# Patient Record
Sex: MALE | Race: WHITE | NOT HISPANIC OR LATINO | Employment: OTHER | URBAN - METROPOLITAN AREA
[De-identification: names, ages, dates, MRNs, and addresses within clinical notes are randomized per-mention and may not be internally consistent; named-entity substitution may affect disease eponyms.]

---

## 2017-09-29 ENCOUNTER — APPOINTMENT (OUTPATIENT)
Dept: PREADMISSION TESTING | Facility: HOSPITAL | Age: 72
End: 2017-09-29

## 2017-09-29 ENCOUNTER — HOSPITAL ENCOUNTER (OUTPATIENT)
Dept: GENERAL RADIOLOGY | Facility: HOSPITAL | Age: 72
Discharge: HOME | End: 2017-09-29
Admitting: UROLOGY

## 2017-09-29 VITALS — HEIGHT: 68 IN | WEIGHT: 241.4 LBS | BODY MASS INDEX: 36.59 KG/M2

## 2017-09-29 LAB
ANION GAP SERPL CALCULATED.3IONS-SCNC: 3 MMOL/L (ref 3–11)
APTT PPP: 27 SECONDS (ref 24–31)
BUN BLD-MCNC: 12 MG/DL (ref 9–23)
BUN/CREAT SERPL: 13.3 (ref 7–25)
CALCIUM SPEC-SCNC: 9.3 MG/DL (ref 8.7–10.4)
CHLORIDE SERPL-SCNC: 107 MMOL/L (ref 99–109)
CO2 SERPL-SCNC: 30 MMOL/L (ref 20–31)
CREAT BLD-MCNC: 0.9 MG/DL (ref 0.6–1.3)
DEPRECATED RDW RBC AUTO: 44.6 FL (ref 37–54)
ERYTHROCYTE [DISTWIDTH] IN BLOOD BY AUTOMATED COUNT: 13.5 % (ref 11.3–14.5)
GFR SERPL CREATININE-BSD FRML MDRD: 83 ML/MIN/1.73
GLUCOSE BLD-MCNC: 117 MG/DL (ref 70–100)
HBA1C MFR BLD: 5.6 % (ref 4.8–5.6)
HCT VFR BLD AUTO: 42.6 % (ref 38.9–50.9)
HGB BLD-MCNC: 14.7 G/DL (ref 13.1–17.5)
INR PPP: 0.98
MCH RBC QN AUTO: 31.5 PG (ref 27–31)
MCHC RBC AUTO-ENTMCNC: 34.5 G/DL (ref 32–36)
MCV RBC AUTO: 91.4 FL (ref 80–99)
PLATELET # BLD AUTO: 162 10*3/MM3 (ref 150–450)
PMV BLD AUTO: 10 FL (ref 6–12)
POTASSIUM BLD-SCNC: 3.9 MMOL/L (ref 3.5–5.5)
PROTHROMBIN TIME: 10.7 SECONDS (ref 9.6–11.5)
RBC # BLD AUTO: 4.66 10*6/MM3 (ref 4.2–5.76)
SODIUM BLD-SCNC: 140 MMOL/L (ref 132–146)
WBC NRBC COR # BLD: 5.4 10*3/MM3 (ref 3.5–10.8)

## 2017-09-29 PROCEDURE — 85027 COMPLETE CBC AUTOMATED: CPT | Performed by: UROLOGY

## 2017-09-29 PROCEDURE — 85610 PROTHROMBIN TIME: CPT | Performed by: UROLOGY

## 2017-09-29 PROCEDURE — 80048 BASIC METABOLIC PNL TOTAL CA: CPT | Performed by: UROLOGY

## 2017-09-29 PROCEDURE — 85730 THROMBOPLASTIN TIME PARTIAL: CPT | Performed by: UROLOGY

## 2017-09-29 PROCEDURE — 83036 HEMOGLOBIN GLYCOSYLATED A1C: CPT | Performed by: UROLOGY

## 2017-09-29 PROCEDURE — 36415 COLL VENOUS BLD VENIPUNCTURE: CPT

## 2017-09-29 PROCEDURE — 71020 HC CHEST PA AND LATERAL: CPT

## 2017-09-29 RX ORDER — DOXAZOSIN MESYLATE 4 MG/1
4 TABLET ORAL
COMMUNITY
End: 2022-06-13 | Stop reason: ALTCHOICE

## 2017-09-29 RX ORDER — LANSOPRAZOLE 30 MG/1
30 CAPSULE, DELAYED RELEASE ORAL 2 TIMES DAILY
COMMUNITY
End: 2022-06-01

## 2017-09-29 RX ORDER — ASCORBIC ACID 500 MG
1000 TABLET ORAL DAILY
COMMUNITY

## 2017-09-29 RX ORDER — ATORVASTATIN CALCIUM 20 MG/1
20 TABLET, FILM COATED ORAL NIGHTLY
COMMUNITY
End: 2022-04-07

## 2017-09-29 RX ORDER — AMLODIPINE BESYLATE AND BENAZEPRIL HYDROCHLORIDE 5; 20 MG/1; MG/1
1 CAPSULE ORAL
Status: ON HOLD | COMMUNITY
End: 2021-11-10

## 2017-09-29 RX ORDER — ASPIRIN 81 MG/1
81 TABLET ORAL DAILY
COMMUNITY

## 2017-09-29 RX ORDER — DOXEPIN HYDROCHLORIDE 50 MG/1
50 CAPSULE ORAL NIGHTLY
COMMUNITY

## 2017-10-02 ENCOUNTER — ANESTHESIA EVENT (OUTPATIENT)
Dept: PERIOP | Facility: HOSPITAL | Age: 72
End: 2017-10-02

## 2017-10-02 RX ORDER — FAMOTIDINE 10 MG/ML
20 INJECTION, SOLUTION INTRAVENOUS ONCE
Status: CANCELLED | OUTPATIENT
Start: 2017-10-02 | End: 2017-10-02

## 2017-10-03 ENCOUNTER — HOSPITAL ENCOUNTER (INPATIENT)
Facility: HOSPITAL | Age: 72
LOS: 2 days | Discharge: HOME | End: 2017-10-05
Attending: UROLOGY | Admitting: UROLOGY

## 2017-10-03 ENCOUNTER — ANESTHESIA (OUTPATIENT)
Dept: PERIOP | Facility: HOSPITAL | Age: 72
End: 2017-10-03

## 2017-10-03 DIAGNOSIS — C61 PROSTATE CANCER (HCC): ICD-10-CM

## 2017-10-03 PROBLEM — G47.33 OSA ON CPAP: Status: ACTIVE | Noted: 2017-10-03

## 2017-10-03 PROBLEM — E78.5 HLD (HYPERLIPIDEMIA): Status: ACTIVE | Noted: 2017-10-03

## 2017-10-03 PROBLEM — Z90.79 S/P PROSTATECTOMY: Status: ACTIVE | Noted: 2017-10-03

## 2017-10-03 PROBLEM — Z99.89 OSA ON CPAP: Status: ACTIVE | Noted: 2017-10-03

## 2017-10-03 PROBLEM — I10 HTN (HYPERTENSION): Status: ACTIVE | Noted: 2017-10-03

## 2017-10-03 LAB
GLUCOSE BLDC GLUCOMTR-MCNC: 185 MG/DL (ref 70–130)
POTASSIUM BLDA-SCNC: 4.07 MMOL/L (ref 3.5–5.3)

## 2017-10-03 PROCEDURE — 25010000002 FENTANYL CITRATE (PF) 100 MCG/2ML SOLUTION: Performed by: NURSE ANESTHETIST, CERTIFIED REGISTERED

## 2017-10-03 PROCEDURE — 84132 ASSAY OF SERUM POTASSIUM: CPT | Performed by: ANESTHESIOLOGY

## 2017-10-03 PROCEDURE — 25010000002 HYDROMORPHONE PER 4 MG: Performed by: NURSE ANESTHETIST, CERTIFIED REGISTERED

## 2017-10-03 PROCEDURE — 25010000002 CEFOXITIN: Performed by: UROLOGY

## 2017-10-03 PROCEDURE — 25010000002 BUPRENORPHINE PER 0.1 MG: Performed by: NURSE ANESTHETIST, CERTIFIED REGISTERED

## 2017-10-03 PROCEDURE — 25010000002 ENOXAPARIN PER 10 MG: Performed by: UROLOGY

## 2017-10-03 PROCEDURE — 25010000002 ONDANSETRON PER 1 MG: Performed by: NURSE ANESTHETIST, CERTIFIED REGISTERED

## 2017-10-03 PROCEDURE — 25010000002 HYDRALAZINE PER 20 MG: Performed by: NURSE PRACTITIONER

## 2017-10-03 PROCEDURE — 25010000002 HYDROMORPHONE PER 4 MG: Performed by: UROLOGY

## 2017-10-03 PROCEDURE — 25010000002 ONDANSETRON PER 1 MG: Performed by: NURSE PRACTITIONER

## 2017-10-03 PROCEDURE — 25010000002 NEOSTIGMINE PER 0.5 MG: Performed by: NURSE ANESTHETIST, CERTIFIED REGISTERED

## 2017-10-03 PROCEDURE — 0VT04ZZ RESECTION OF PROSTATE, PERCUTANEOUS ENDOSCOPIC APPROACH: ICD-10-PCS | Performed by: UROLOGY

## 2017-10-03 PROCEDURE — 88309 TISSUE EXAM BY PATHOLOGIST: CPT | Performed by: UROLOGY

## 2017-10-03 PROCEDURE — 8E0W4CZ ROBOTIC ASSISTED PROCEDURE OF TRUNK REGION, PERCUTANEOUS ENDOSCOPIC APPROACH: ICD-10-PCS | Performed by: UROLOGY

## 2017-10-03 PROCEDURE — 82962 GLUCOSE BLOOD TEST: CPT

## 2017-10-03 PROCEDURE — 25010000002 DEXAMETHASONE PER 1 MG: Performed by: NURSE ANESTHETIST, CERTIFIED REGISTERED

## 2017-10-03 PROCEDURE — 25010000002 PROPOFOL 10 MG/ML EMULSION: Performed by: NURSE ANESTHETIST, CERTIFIED REGISTERED

## 2017-10-03 PROCEDURE — 25010000002 ONDANSETRON PER 1 MG: Performed by: UROLOGY

## 2017-10-03 PROCEDURE — 25010000002 DEXAMETHASONE SODIUM PHOSPHATE 10 MG/ML SOLUTION: Performed by: NURSE ANESTHETIST, CERTIFIED REGISTERED

## 2017-10-03 DEVICE — GRFT ECM STRAVIX FZ 3X6CM 18SQCM: Type: IMPLANTABLE DEVICE | Status: FUNCTIONAL

## 2017-10-03 DEVICE — SEALANT FIBRIN TISSEEL FZ 4ML: Type: IMPLANTABLE DEVICE | Site: BLADDER | Status: FUNCTIONAL

## 2017-10-03 RX ORDER — PANTOPRAZOLE SODIUM 40 MG/1
40 TABLET, DELAYED RELEASE ORAL EVERY MORNING
Status: DISCONTINUED | OUTPATIENT
Start: 2017-10-04 | End: 2017-10-05 | Stop reason: HOSPADM

## 2017-10-03 RX ORDER — LIDOCAINE HYDROCHLORIDE 10 MG/ML
INJECTION, SOLUTION INFILTRATION; PERINEURAL AS NEEDED
Status: DISCONTINUED | OUTPATIENT
Start: 2017-10-03 | End: 2017-10-03 | Stop reason: SURG

## 2017-10-03 RX ORDER — MAGNESIUM HYDROXIDE 1200 MG/15ML
LIQUID ORAL AS NEEDED
Status: DISCONTINUED | OUTPATIENT
Start: 2017-10-03 | End: 2017-10-03 | Stop reason: HOSPADM

## 2017-10-03 RX ORDER — BUPIVACAINE HYDROCHLORIDE 2.5 MG/ML
INJECTION, SOLUTION EPIDURAL; INFILTRATION; INTRACAUDAL AS NEEDED
Status: DISCONTINUED | OUTPATIENT
Start: 2017-10-03 | End: 2017-10-03 | Stop reason: SURG

## 2017-10-03 RX ORDER — PROMETHAZINE HYDROCHLORIDE 25 MG/ML
6.25 INJECTION, SOLUTION INTRAMUSCULAR; INTRAVENOUS ONCE AS NEEDED
Status: DISCONTINUED | OUTPATIENT
Start: 2017-10-03 | End: 2017-10-03 | Stop reason: HOSPADM

## 2017-10-03 RX ORDER — DOCUSATE SODIUM 100 MG/1
100 CAPSULE, LIQUID FILLED ORAL 2 TIMES DAILY PRN
Status: DISCONTINUED | OUTPATIENT
Start: 2017-10-03 | End: 2017-10-05 | Stop reason: HOSPADM

## 2017-10-03 RX ORDER — FAMOTIDINE 20 MG/1
20 TABLET, FILM COATED ORAL ONCE
Status: COMPLETED | OUTPATIENT
Start: 2017-10-03 | End: 2017-10-03

## 2017-10-03 RX ORDER — BENAZEPRIL HYDROCHLORIDE 20 MG/1
20 TABLET ORAL
Status: DISCONTINUED | OUTPATIENT
Start: 2017-10-03 | End: 2017-10-05 | Stop reason: HOSPADM

## 2017-10-03 RX ORDER — NALOXONE HCL 0.4 MG/ML
0.1 VIAL (ML) INJECTION
Status: DISCONTINUED | OUTPATIENT
Start: 2017-10-03 | End: 2017-10-05 | Stop reason: HOSPADM

## 2017-10-03 RX ORDER — PROMETHAZINE HYDROCHLORIDE 25 MG/1
25 SUPPOSITORY RECTAL ONCE AS NEEDED
Status: DISCONTINUED | OUTPATIENT
Start: 2017-10-03 | End: 2017-10-03 | Stop reason: HOSPADM

## 2017-10-03 RX ORDER — SODIUM CHLORIDE 9 MG/ML
INJECTION, SOLUTION INTRAVENOUS AS NEEDED
Status: DISCONTINUED | OUTPATIENT
Start: 2017-10-03 | End: 2017-10-03 | Stop reason: HOSPADM

## 2017-10-03 RX ORDER — FENTANYL CITRATE 50 UG/ML
INJECTION, SOLUTION INTRAMUSCULAR; INTRAVENOUS AS NEEDED
Status: DISCONTINUED | OUTPATIENT
Start: 2017-10-03 | End: 2017-10-03 | Stop reason: SURG

## 2017-10-03 RX ORDER — ONDANSETRON 2 MG/ML
4 INJECTION INTRAMUSCULAR; INTRAVENOUS EVERY 6 HOURS PRN
Status: DISCONTINUED | OUTPATIENT
Start: 2017-10-03 | End: 2017-10-05 | Stop reason: HOSPADM

## 2017-10-03 RX ORDER — FENTANYL CITRATE 50 UG/ML
50 INJECTION, SOLUTION INTRAMUSCULAR; INTRAVENOUS
Status: DISCONTINUED | OUTPATIENT
Start: 2017-10-03 | End: 2017-10-03 | Stop reason: HOSPADM

## 2017-10-03 RX ORDER — ONDANSETRON 2 MG/ML
INJECTION INTRAMUSCULAR; INTRAVENOUS AS NEEDED
Status: DISCONTINUED | OUTPATIENT
Start: 2017-10-03 | End: 2017-10-03 | Stop reason: SURG

## 2017-10-03 RX ORDER — ATRACURIUM BESYLATE 10 MG/ML
INJECTION, SOLUTION INTRAVENOUS AS NEEDED
Status: DISCONTINUED | OUTPATIENT
Start: 2017-10-03 | End: 2017-10-03 | Stop reason: SURG

## 2017-10-03 RX ORDER — SODIUM CHLORIDE 0.9 % (FLUSH) 0.9 %
1-10 SYRINGE (ML) INJECTION AS NEEDED
Status: DISCONTINUED | OUTPATIENT
Start: 2017-10-03 | End: 2017-10-03 | Stop reason: HOSPADM

## 2017-10-03 RX ORDER — DOXEPIN HYDROCHLORIDE 50 MG/1
50 CAPSULE ORAL NIGHTLY
Status: DISCONTINUED | OUTPATIENT
Start: 2017-10-03 | End: 2017-10-05 | Stop reason: HOSPADM

## 2017-10-03 RX ORDER — PROPOFOL 10 MG/ML
VIAL (ML) INTRAVENOUS AS NEEDED
Status: DISCONTINUED | OUTPATIENT
Start: 2017-10-03 | End: 2017-10-03 | Stop reason: SURG

## 2017-10-03 RX ORDER — HYDROMORPHONE HYDROCHLORIDE 1 MG/ML
0.5 INJECTION, SOLUTION INTRAMUSCULAR; INTRAVENOUS; SUBCUTANEOUS
Status: DISCONTINUED | OUTPATIENT
Start: 2017-10-03 | End: 2017-10-05 | Stop reason: HOSPADM

## 2017-10-03 RX ORDER — SODIUM CHLORIDE 9 MG/ML
100 INJECTION, SOLUTION INTRAVENOUS CONTINUOUS
Status: DISCONTINUED | OUTPATIENT
Start: 2017-10-03 | End: 2017-10-05 | Stop reason: HOSPADM

## 2017-10-03 RX ORDER — GLYCOPYRROLATE 0.2 MG/ML
INJECTION INTRAMUSCULAR; INTRAVENOUS AS NEEDED
Status: DISCONTINUED | OUTPATIENT
Start: 2017-10-03 | End: 2017-10-03 | Stop reason: SURG

## 2017-10-03 RX ORDER — PROMETHAZINE HYDROCHLORIDE 25 MG/1
25 TABLET ORAL ONCE AS NEEDED
Status: DISCONTINUED | OUTPATIENT
Start: 2017-10-03 | End: 2017-10-03 | Stop reason: HOSPADM

## 2017-10-03 RX ORDER — TERAZOSIN 5 MG/1
5 CAPSULE ORAL NIGHTLY
Status: DISCONTINUED | OUTPATIENT
Start: 2017-10-03 | End: 2017-10-05 | Stop reason: HOSPADM

## 2017-10-03 RX ORDER — LIDOCAINE HYDROCHLORIDE 10 MG/ML
0.5 INJECTION, SOLUTION EPIDURAL; INFILTRATION; INTRACAUDAL; PERINEURAL ONCE AS NEEDED
Status: COMPLETED | OUTPATIENT
Start: 2017-10-03 | End: 2017-10-03

## 2017-10-03 RX ORDER — ATORVASTATIN CALCIUM 20 MG/1
20 TABLET, FILM COATED ORAL NIGHTLY
Status: DISCONTINUED | OUTPATIENT
Start: 2017-10-03 | End: 2017-10-05 | Stop reason: HOSPADM

## 2017-10-03 RX ORDER — HYDROMORPHONE HYDROCHLORIDE 1 MG/ML
0.5 INJECTION, SOLUTION INTRAMUSCULAR; INTRAVENOUS; SUBCUTANEOUS
Status: DISCONTINUED | OUTPATIENT
Start: 2017-10-03 | End: 2017-10-03 | Stop reason: HOSPADM

## 2017-10-03 RX ORDER — HYDRALAZINE HYDROCHLORIDE 20 MG/ML
10 INJECTION INTRAMUSCULAR; INTRAVENOUS EVERY 6 HOURS PRN
Status: DISCONTINUED | OUTPATIENT
Start: 2017-10-03 | End: 2017-10-05 | Stop reason: HOSPADM

## 2017-10-03 RX ORDER — SODIUM CHLORIDE, SODIUM LACTATE, POTASSIUM CHLORIDE, CALCIUM CHLORIDE 600; 310; 30; 20 MG/100ML; MG/100ML; MG/100ML; MG/100ML
9 INJECTION, SOLUTION INTRAVENOUS CONTINUOUS
Status: DISCONTINUED | OUTPATIENT
Start: 2017-10-03 | End: 2017-10-05 | Stop reason: HOSPADM

## 2017-10-03 RX ORDER — DEXAMETHASONE SODIUM PHOSPHATE 10 MG/ML
INJECTION, SOLUTION INTRAMUSCULAR; INTRAVENOUS AS NEEDED
Status: DISCONTINUED | OUTPATIENT
Start: 2017-10-03 | End: 2017-10-03 | Stop reason: SURG

## 2017-10-03 RX ORDER — DEXAMETHASONE SODIUM PHOSPHATE 4 MG/ML
INJECTION, SOLUTION INTRA-ARTICULAR; INTRALESIONAL; INTRAMUSCULAR; INTRAVENOUS; SOFT TISSUE AS NEEDED
Status: DISCONTINUED | OUTPATIENT
Start: 2017-10-03 | End: 2017-10-03 | Stop reason: SURG

## 2017-10-03 RX ORDER — ONDANSETRON 4 MG/1
4 TABLET, FILM COATED ORAL EVERY 6 HOURS PRN
Status: DISCONTINUED | OUTPATIENT
Start: 2017-10-03 | End: 2017-10-05 | Stop reason: HOSPADM

## 2017-10-03 RX ORDER — BUPRENORPHINE HYDROCHLORIDE 0.32 MG/ML
INJECTION INTRAMUSCULAR; INTRAVENOUS AS NEEDED
Status: DISCONTINUED | OUTPATIENT
Start: 2017-10-03 | End: 2017-10-03 | Stop reason: SURG

## 2017-10-03 RX ORDER — OXYCODONE AND ACETAMINOPHEN 10; 325 MG/1; MG/1
1 TABLET ORAL EVERY 4 HOURS PRN
Status: DISCONTINUED | OUTPATIENT
Start: 2017-10-03 | End: 2017-10-04

## 2017-10-03 RX ORDER — AMLODIPINE BESYLATE 5 MG/1
5 TABLET ORAL
Status: DISCONTINUED | OUTPATIENT
Start: 2017-10-03 | End: 2017-10-05 | Stop reason: HOSPADM

## 2017-10-03 RX ADMIN — FENTANYL CITRATE 50 MCG: 50 INJECTION, SOLUTION INTRAMUSCULAR; INTRAVENOUS at 11:15

## 2017-10-03 RX ADMIN — CEFOXITIN SODIUM 2 G: 2 POWDER, FOR SOLUTION INTRAVENOUS at 18:41

## 2017-10-03 RX ADMIN — BUPIVACAINE HYDROCHLORIDE 60 ML: 2.5 INJECTION, SOLUTION EPIDURAL; INFILTRATION; INTRACAUDAL; PERINEURAL at 09:41

## 2017-10-03 RX ADMIN — ONDANSETRON 4 MG: 2 INJECTION INTRAMUSCULAR; INTRAVENOUS at 13:49

## 2017-10-03 RX ADMIN — CEFOXITIN 2 G: 2 INJECTION, POWDER, FOR SOLUTION INTRAVENOUS at 09:37

## 2017-10-03 RX ADMIN — Medication 4 MG: at 11:13

## 2017-10-03 RX ADMIN — DOXEPIN HYDROCHLORIDE 50 MG: 50 CAPSULE ORAL at 20:23

## 2017-10-03 RX ADMIN — LIDOCAINE HYDROCHLORIDE 0.2 ML: 10 INJECTION, SOLUTION EPIDURAL; INFILTRATION; INTRACAUDAL; PERINEURAL at 07:27

## 2017-10-03 RX ADMIN — FENTANYL CITRATE 50 MCG: 50 INJECTION, SOLUTION INTRAMUSCULAR; INTRAVENOUS at 12:20

## 2017-10-03 RX ADMIN — ENOXAPARIN SODIUM 40 MG: 40 INJECTION SUBCUTANEOUS at 15:49

## 2017-10-03 RX ADMIN — ONDANSETRON 4 MG: 2 INJECTION INTRAMUSCULAR; INTRAVENOUS at 11:03

## 2017-10-03 RX ADMIN — SODIUM CHLORIDE, POTASSIUM CHLORIDE, SODIUM LACTATE AND CALCIUM CHLORIDE: 600; 310; 30; 20 INJECTION, SOLUTION INTRAVENOUS at 10:46

## 2017-10-03 RX ADMIN — GLYCOPYRROLATE 0.4 MG: 0.2 INJECTION, SOLUTION INTRAMUSCULAR; INTRAVENOUS at 11:13

## 2017-10-03 RX ADMIN — HYDROMORPHONE HYDROCHLORIDE 0.5 MG: 1 INJECTION, SOLUTION INTRAMUSCULAR; INTRAVENOUS; SUBCUTANEOUS at 11:50

## 2017-10-03 RX ADMIN — TERAZOSIN HYDROCHLORIDE ANHYDROUS 5 MG: 5 CAPSULE ORAL at 20:23

## 2017-10-03 RX ADMIN — SODIUM CHLORIDE 100 ML/HR: 9 INJECTION, SOLUTION INTRAVENOUS at 13:49

## 2017-10-03 RX ADMIN — EPHEDRINE SULFATE 10 MG: 50 INJECTION INTRAMUSCULAR; INTRAVENOUS; SUBCUTANEOUS at 10:07

## 2017-10-03 RX ADMIN — ONDANSETRON 4 MG: 2 INJECTION INTRAMUSCULAR; INTRAVENOUS at 14:52

## 2017-10-03 RX ADMIN — HYDROMORPHONE HYDROCHLORIDE 0.5 MG: 1 INJECTION, SOLUTION INTRAMUSCULAR; INTRAVENOUS; SUBCUTANEOUS at 22:05

## 2017-10-03 RX ADMIN — BUPRENORPHINE HYDROCHLORIDE 0.3 MG: 0.3 INJECTION INTRAMUSCULAR; INTRAVENOUS at 09:41

## 2017-10-03 RX ADMIN — HYDROMORPHONE HYDROCHLORIDE 0.5 MG: 1 INJECTION, SOLUTION INTRAMUSCULAR; INTRAVENOUS; SUBCUTANEOUS at 13:49

## 2017-10-03 RX ADMIN — ATORVASTATIN CALCIUM 20 MG: 20 TABLET, FILM COATED ORAL at 20:23

## 2017-10-03 RX ADMIN — HYDROMORPHONE HYDROCHLORIDE 0.5 MG: 1 INJECTION, SOLUTION INTRAMUSCULAR; INTRAVENOUS; SUBCUTANEOUS at 15:49

## 2017-10-03 RX ADMIN — HYDRALAZINE HYDROCHLORIDE 10 MG: 20 INJECTION INTRAMUSCULAR; INTRAVENOUS at 23:30

## 2017-10-03 RX ADMIN — HYDROMORPHONE HYDROCHLORIDE 0.5 MG: 1 INJECTION, SOLUTION INTRAMUSCULAR; INTRAVENOUS; SUBCUTANEOUS at 18:31

## 2017-10-03 RX ADMIN — ATROPA BELLADONNA AND OPIUM 30 MG: 16.2; 3 SUPPOSITORY RECTAL at 12:05

## 2017-10-03 RX ADMIN — SODIUM CHLORIDE, POTASSIUM CHLORIDE, SODIUM LACTATE AND CALCIUM CHLORIDE: 600; 310; 30; 20 INJECTION, SOLUTION INTRAVENOUS at 09:37

## 2017-10-03 RX ADMIN — SODIUM CHLORIDE 100 ML/HR: 9 INJECTION, SOLUTION INTRAVENOUS at 22:05

## 2017-10-03 RX ADMIN — SODIUM CHLORIDE, POTASSIUM CHLORIDE, SODIUM LACTATE AND CALCIUM CHLORIDE 9 ML/HR: 600; 310; 30; 20 INJECTION, SOLUTION INTRAVENOUS at 07:27

## 2017-10-03 RX ADMIN — FENTANYL CITRATE 50 MCG: 50 INJECTION, SOLUTION INTRAMUSCULAR; INTRAVENOUS at 09:40

## 2017-10-03 RX ADMIN — LIDOCAINE HYDROCHLORIDE 50 MG: 10 INJECTION, SOLUTION INFILTRATION; PERINEURAL at 09:40

## 2017-10-03 RX ADMIN — FAMOTIDINE 20 MG: 20 TABLET ORAL at 07:27

## 2017-10-03 RX ADMIN — PROPOFOL 150 MG: 10 INJECTION, EMULSION INTRAVENOUS at 09:40

## 2017-10-03 RX ADMIN — EPHEDRINE SULFATE 10 MG: 50 INJECTION INTRAMUSCULAR; INTRAVENOUS; SUBCUTANEOUS at 10:16

## 2017-10-03 RX ADMIN — DEXAMETHASONE SODIUM PHOSPHATE 4 MG: 10 INJECTION, SOLUTION INTRAMUSCULAR; INTRAVENOUS at 09:41

## 2017-10-03 RX ADMIN — ATRACURIUM BESYLATE 50 MG: 10 INJECTION, SOLUTION INTRAVENOUS at 09:40

## 2017-10-03 RX ADMIN — DEXAMETHASONE SODIUM PHOSPHATE 4 MG: 4 INJECTION, SOLUTION INTRAMUSCULAR; INTRAVENOUS at 09:40

## 2017-10-03 RX ADMIN — GLYCOPYRROLATE 0.2 MG: 0.2 INJECTION, SOLUTION INTRAMUSCULAR; INTRAVENOUS at 10:52

## 2017-10-03 RX ADMIN — EPHEDRINE SULFATE 10 MG: 50 INJECTION INTRAMUSCULAR; INTRAVENOUS; SUBCUTANEOUS at 10:52

## 2017-10-03 NOTE — H&P
"  Patient Care Team:      Chief complaint: newly dx prostate cancer    Subjective:    Patient is a 71 y.o.male presents with elevated PSA 6.3 and a prostate bx that showed adenoma of the prostate gland with Linh score of  3+3=6 involving 2 out of 12 cores. He is here today for surgical intervention.    Review of Systems:  General ROS: negative for urinary sx or erectile dysfunction.  Cardiovascular ROS: no chest pain or dyspnea on exertion. H/o CAD with \" leaky valves\" per pt followed by Dr. Scott with last visit about 6 months ago. States he did call Dr. Scott's office and informed them of upcoming surgery.He denies any significant SOA and reports he is able to walk up stairs without difficulty.   Respiratory ROS: no cough, shortness of breath, or wheezing      Allergies:   Allergies   Allergen Reactions   • Levaquin [Levofloxacin] Itching          Latex:Denies  Contrast Dye: Denies    Home Meds    Prescriptions Prior to Admission   Medication Sig Dispense Refill Last Dose   • amLODIPine-benazepril (LOTREL 5-20) 5-20 MG per capsule Take 1 capsule by mouth Daily With Breakfast.      • aspirin 81 MG EC tablet Take 81 mg by mouth Daily. Stopped for surgery   9/23/2017   • atorvastatin (LIPITOR) 20 MG tablet Take 20 mg by mouth Every Night.      • doxazosin (CARDURA) 4 MG tablet Take 4 mg by mouth Daily With Breakfast.      • doxepin (SINEquan) 50 MG capsule Take 50 mg by mouth Every Night.      • lansoprazole (PREVACID) 30 MG capsule Take 30 mg by mouth 2 (Two) Times a Day.      • vitamin C (ASCORBIC ACID) 500 MG tablet Take 500 mg by mouth Daily.        PMH:   Past Medical History:   Diagnosis Date   • Arthritis     multi joint jose left knee    • Cancer     prostate per elevated PSA and biopsies    • Elevated PSA    • H/O valvular heart disease     3 leaky valves- monitored by FMD and Dr Scott (sees every 6 months)   • Hearing loss     wears aid on rt ear    • Heart murmur    • Hemorrhoid     bleeds on " occasion    • Hiatal hernia    • History of cellulitis    • Hypertension    • Psoriasis     spots on legs    • Restless leg syndrome    • Sleep apnea with use of continuous positive airway pressure (CPAP)     7-12 auto setting    • Wears glasses    • Wears glasses    • Wears hearing aid     right    • Wears partial dentures      PSH:    Past Surgical History:   Procedure Laterality Date   • COLONOSCOPY     • CYSTOSCOPY AND NEEDLE BIOPSY PROSTATE      twice; last time 2017     Immunization History: pneumo: Yes   Flu: No  Tetanus: No    Social History:   Tobacco: Quit 1990   Alcohol: No      Physical Exam:  Visit Vitals   • /86   • Pulse 75   • Temp 97.5 °F (36.4 °C) (Temporal Artery )   • Resp 18         General Appearance:    Alert, cooperative, no distress, appears stated age   Head:    Normocephalic, without obvious abnormality, atraumatic   Lungs:     Clear to auscultation bilaterally, respirations unlabored    Heart: Regular rate and rhythm, S1 and S2 normal with murmur appreciated, no rub or gallop.    Abdomen:    Soft without tenderness   Breast Exam:    deferred   Genitalia:    deferred   Extremities:   Extremities normal, atraumatic, no cyanosis or edema   Skin:   Skin color, texture, turgor normal, no rashes or lesions   Neurologic:   Grossly intact     Results Review:   Results for DONAVON GUERRIER (MRN [id number]) as of 10/3/2017 07:31   Ref. Range 9/29/2017 12:45 9/29/2017 12:56   Glucose Latest Ref Range: 70 - 100 mg/dL 117 (H)    Sodium Latest Ref Range: 132 - 146 mmol/L 140    Potassium Latest Ref Range: 3.5 - 5.5 mmol/L 3.9    CO2 Latest Ref Range: 20.0 - 31.0 mmol/L 30.0    Chloride Latest Ref Range: 99 - 109 mmol/L 107    Anion Gap Latest Ref Range: 3.0 - 11.0 mmol/L 3.0    Creatinine Latest Ref Range: 0.60 - 1.30 mg/dL 0.90    BUN Latest Ref Range: 9 - 23 mg/dL 12    BUN/Creatinine Ratio Latest Ref Range: 7.0 - 25.0  13.3    Calcium Latest Ref Range: 8.7 - 10.4 mg/dL 9.3    eGFR Non African  Amer Latest Ref Range: >60 mL/min/1.73 83    Hemoglobin A1C Latest Ref Range: 4.80 - 5.60 %  5.60   Protime Latest Ref Range: 9.6 - 11.5 Seconds 10.7    INR Unknown 0.98    aPTT Latest Ref Range: 24.0 - 31.0 seconds 27.0    WBC Latest Ref Range: 3.50 - 10.80 10*3/mm3 5.40    RBC Latest Ref Range: 4.20 - 5.76 10*6/mm3 4.66    Hemoglobin Latest Ref Range: 13.1 - 17.5 g/dL 14.7    Hematocrit Latest Ref Range: 38.9 - 50.9 % 42.6    RDW Latest Ref Range: 11.3 - 14.5 % 13.5    MCV Latest Ref Range: 80.0 - 99.0 fL 91.4    MCH Latest Ref Range: 27.0 - 31.0 pg 31.5 (H)    MCHC Latest Ref Range: 32.0 - 36.0 g/dL 34.5    MPV Latest Ref Range: 6.0 - 12.0 fL 10.0    Platelets Latest Ref Range: 150 - 450 10*3/mm3 162    RDW-SD Latest Ref Range: 37.0 - 54.0 fl 44.6        Cancer Patient:  X__ yes __no __unknown; If yes, clinical stage T:_1_ N:__M:__, stage group    Impression: Prostate cancer    Plan: Robotic prostatesingh Conde, IRMA 10/3/2017 7:24 AM

## 2017-10-03 NOTE — PLAN OF CARE
Problem: Patient Care Overview (Adult)  Goal: Plan of Care Review  Outcome: Ongoing (interventions implemented as appropriate)    10/03/17 1334   Coping/Psychosocial Response Interventions   Plan Of Care Reviewed With patient   Patient Care Overview   Progress progress toward functional goals as expected   Outcome Evaluation   Outcome Summary/Follow up Plan Nauseated. Family bedside       Goal: Adult Individualization and Mutuality  Outcome: Ongoing (interventions implemented as appropriate)    10/03/17 1334   Individualization   Patient Specific Interventions Monitor pain q1h and prn         Problem: Perioperative Period (Adult)  Goal: Signs and Symptoms of Listed Potential Problems Will be Absent or Manageable (Perioperative Period)  Outcome: Ongoing (interventions implemented as appropriate)    10/03/17 1334   Perioperative Period   Problems Assessed (Perioperative Period) all   Problems Present (Perioperative Period) pain

## 2017-10-03 NOTE — OP NOTE
PROSTATECTOMY LAPAROSCOPIC WITH DAVINCI ROBOT  Procedure Note    Ángel Parikh  10/3/2017    Pre-op Diagnosis:   Prostate cancer    Post-op Diagnosis:     Prostate cancer    Procedure/CPT® Codes:      Procedure(s):  PROSTATECTOMY LAPAROSCOPIC WITH DAVINCI ROBOT    Surgeon(s):  Jameel Ferguson Jr., MD    Anesthesia: General with Block    Staff:   Circulator: Nimisha Cabral; Jackie Sampson RN; Jennifer Lopez RN  Scrub Person: Castro Snowden; Kizzy Copeland; Julieta Bhatti; Yamile Evans  Assistant: Santiago Gaines PA-C    Estimated Blood Loss: 120 mL  Urine Voided: * No values recorded between 10/3/2017  9:36 AM and 10/3/2017 11:20 AM *    Specimens:                  ID Type Source Tests Collected by Time Destination   A :  Tissue Prostate TISSUE EXAM Jameel Fergsuon Jr., MD 10/3/2017 1109          Drains:   Drain/Device Site 10/03/17 1030 Right medial abdomen collapsible closed device 1 (Active)       Urethral Catheter 10/03/17 0955 100% silicone 20 10 10 (Active)           Findings: No extraprostatic extension    Complications: None    History: 71-year-old gentleman with recent diagnosis of low risk prostate cancer.  He understands the treatment options for lowers prostate cancer including active surveillance history is go forward with surgical therapy.  He wishes to proceed with robotic-assisted laparoscopic prostatectomy understanding the risks and benefits therein and gives his full consent.    Operative Report: Patient gives his full consent and was brought into the operating room where anesthesia is induced difficulty.  He's placed in dorsal lithotomy position padding all pressure points.  Veress needle technique is used to create pneumoperitoneum.  12 mm blunt Visiport is used at the umbilicus to create camera trocar site.  The remainder of the robotic and assistant trochars are placed under direct vision.  Patient's placed in steep Trendelenburg position.  Robot is docked.  The  bladder is dissected posteriorly.  Endopelvic fascia is opened bilaterally and puboprostatic ligaments are divided.  Patient's pelvic angle is difficult for access into the deep pelvis.  Ligature of 0 Vicryl suture is placed in the dorsal venous complex figure-of-eight fashion.  Anterior bladder neck is identified divided.  Posterior bladder neck dissection is carried out.  Seminal vesicles and vas deferens are identified and dissected out in their entirety.  We dissectedposterior to the prostate and anterior to the rectum up to the apex of the prostate.  Prostatic pedicles were isolated and taken down using absorbable clips.  Nerve sparing procedure is then performed by lifting the veil of Aphrodite and teasing the neurovascular bundles away from the posterior lateral aspects of the prostate using a thermic technique.  Then divided our dorsal venous complex formed our apical dissection dividing the urethra and placed our specimen into the specimen sac.  Stravix material was used as part of her nerve sparing procedure.  2-0 Monocryl running anastomosis is then performed to create vesicourethral anastomosis.  This was watertight.  A drain was brought in through the #1 robotic trocar site.  Robot was undocked.  We enlarged the fascial defect at the umbilicus and delivered specimen.  This fascial defect was closed using a 0 PDS suture.  All incisions were irrigated.  Subcutaneous cutaneous tissues brought together using 3-0 Vicryl.  Skin was brought together using Dermabond.  Breaux catheter and MICHELLE drain were secured in the place.  Patient was awoken from anesthesia and taken the recovery room in stable condition.    Jameel Ferguson Jr., MD     Date: 10/3/2017  Time: 11:20 AM

## 2017-10-03 NOTE — ANESTHESIA PROCEDURE NOTES
Peripheral Block    Patient location during procedure: OR  Start time: 10/3/2017 9:40 AM  Stop time: 10/3/2017 9:44 AM  Reason for block: at surgeon's request and post-op pain management  Performed by  Anesthesiologist: THOR JONES  Preanesthetic Checklist  Completed: patient identified, site marked, surgical consent, pre-op evaluation, timeout performed, IV checked, risks and benefits discussed and monitors and equipment checked  Prep:  Pt Position: supine  Sterile barriers:cap, gloves, sterile barriers and mask  Prep: ChloraPrep  Patient monitoring: blood pressure monitoring, continuous pulse oximetry and EKG  Procedure  Sedation:yes  Performed under: general  Guidance:ultrasound guided  Images:still images obtained    Laterality:Bilateral  Block Type:TAP  Injection Technique:single-shot  Needle Type:short-bevel and echogenic  Needle Gauge:20 G    Medications  Comment:Block Injection:  LA dose divided between Right and Left block       Adjuncts:  Decadron 4mg PSF, Buprenex 0.3mg (Per total volume of LA)  Local Injected:bupivacaine 0.25% Local Amount Injected:60mL  Post Assessment  Injection Assessment: negative aspiration for heme, incremental injection and no paresthesia on injection  Patient Tolerance:comfortable throughout block  Complications:no  Additional Notes      Under Ultrasound guidance, a BBraun 4inch 360 degree needle was advanced with Normal Saline hydro dissection of tissue.  The Internal Oblique and Transversus Abdominus muscles where visualized.  At or before the aponeurosis of Internal Oblique, local anesthetic spread was visualized in the Transversus Abdominus Plane. Injection was made incrementally with aspiration every 5 mls.  There was no  intravascular injection,  injection pressure was normal, there was no neural injection, and the procedure was completed without difficulty.  Thank You.

## 2017-10-03 NOTE — H&P
Admission      Patient Name: Ángel Parikh  MRN: [id number]  : 1945  DOS: 10/3/2017    Attending: Jameel Ferguson Jr.*    Primary Care Provider: Perez Pompa MD      Patient Care Team:  Perez Pompa MD as PCP - General (Family Medicine)  Reggie Scott MD as Consulting Physician (Cardiology)    Chief complaint:  prostate cancer    Subjective   Patient is a 71 y.o. male presented prostatectomy by Dr. Marty Navarrete under [place]. He tolerated surgery well and is admitted for further medical management.  He had prostate biopsy about one month ago.     When seen postop he is doing well.  His pain is well controlled.  He denies nausea, shortness of breath or chest pain. No hx of DVT or PE.    He has a history of hypertension, heart murmur, and valvular heart disease and is followed by Dr. Scott, cardiology.    ( Above noted and agree. Seen in his room later. Doing well. Good pain control. No f/c/n/vom/sob. )wy    Allergies:  Allergies   Allergen Reactions   • Levaquin [Levofloxacin] Itching       Meds:  Prescriptions Prior to Admission   Medication Sig Dispense Refill Last Dose   • amLODIPine-benazepril (LOTREL 5-20) 5-20 MG per capsule Take 1 capsule by mouth Daily With Breakfast.   10/2/2017 at 2100   • atorvastatin (LIPITOR) 20 MG tablet Take 20 mg by mouth Every Night.   10/2/2017 at 2100   • doxazosin (CARDURA) 4 MG tablet Take 4 mg by mouth Daily With Breakfast.   10/3/2017 at 0500   • doxepin (SINEquan) 50 MG capsule Take 50 mg by mouth Every Night.   10/2/2017 at 2100   • lansoprazole (PREVACID) 30 MG capsule Take 30 mg by mouth 2 (Two) Times a Day.   10/3/2017 at 0500   • vitamin C (ASCORBIC ACID) 500 MG tablet Take 500 mg by mouth Daily.   10/2/2017 at 2100   • aspirin 81 MG EC tablet Take 81 mg by mouth Daily. Stopped for surgery   2017       History:   Past Medical History:   Diagnosis Date   • Arthritis     multi joint jose left knee    • Cancer     prostate per  "elevated PSA and biopsies    • Elevated PSA    • H/O valvular heart disease     3 leaky valves- monitored by FMD and Dr Scott (sees every 6 months)   • Hearing loss     wears aid on rt ear    • Heart murmur    • Hemorrhoid     bleeds on occasion    • Hiatal hernia    • History of cellulitis    • Hypertension    • Psoriasis     spots on legs    • Restless leg syndrome    • Sleep apnea with use of continuous positive airway pressure (CPAP)     7-12 auto setting    • Wears glasses    • Wears glasses    • Wears hearing aid     right    • Wears partial dentures      Past Surgical History:   Procedure Laterality Date   • COLONOSCOPY     • CYSTOSCOPY AND NEEDLE BIOPSY PROSTATE      twice; last time 2017     History reviewed. No pertinent family history.  Social History   Substance Use Topics   • Smoking status: Former Smoker     Years: 20.00     Types: Pipe, Cigars     Quit date: 1990   • Smokeless tobacco: Never Used      Comment: smoked 3 cigars a day and pipe off and on    • Alcohol use No   He is  with 2 children.  He is retired [employer].    Review of Systems  Pertinent items are noted in HPI, all other systems reviewed and negative    Vital Signs  /100 (BP Location: Right arm, Patient Position: Lying)  Pulse 106  Temp 97.8 °F (36.6 °C) (Oral)   Resp 16  Ht 68\" (172.7 cm)  Wt 240 lb (109 kg)  SpO2 98%  BMI 36.49 kg/m2    Physical Exam:    General Appearance:    Alert, cooperative, in no acute distress. Craig   Head:    Normocephalic, without obvious abnormality, atraumatic   Eyes:            Lids and lashes normal, conjunctivae and sclerae normal, no   icterus, no pallor, corneas clear, PERRLA   Ears:    Ears appear intact with no abnormalities noted   Neck:   No adenopathy, supple, trachea midline, no thyromegaly, no     carotid bruit, no JVD   Lungs:     Clear to auscultation,respirations regular, even and                   unlabored    Heart:    Regular rhythm and normal rate, normal S1 " and S2   Abdomen:     Abdomen soft and nontender.  Lap sites clean dry intact and well approximated.  MICHELLE present    Genitalia:    Deferred. Breaux present with blood-tinged urine.     Extremities:   Moves all extremities well, no edema, no cyanosis, no              redness   Pulses:   Pulses palpable and equal bilaterally   Skin:   No bleeding, bruising or rash   Neurologic:   Cranial nerves 2 - 12 grossly intact, sensation intact       Results from last 7 days  Lab Units 09/29/17  1245   WBC 10*3/mm3 5.40   HEMOGLOBIN g/dL 14.7   HEMATOCRIT % 42.6   PLATELETS 10*3/mm3 162       Results from last 7 days  Lab Units 09/29/17  1245   SODIUM mmol/L 140   POTASSIUM mmol/L 3.9   CHLORIDE mmol/L 107   CO2 mmol/L 30.0   BUN mg/dL 12   CREATININE mg/dL 0.90   CALCIUM mg/dL 9.3   GLUCOSE mg/dL 117*     Lab Results   Component Value Date    HGBA1C 5.60 09/29/2017       Assessment and Plan:   Principal Problem:    S/P prostatectomy  Active Problems:    Prostate cancer    HTN (hypertension)    HLD (hyperlipidemia)    MAE on CPAP      Plan  1. Ambulation  2. Pain control-prns   3. IS-encourage  4. DVT proph- mechs/Lovenox  5. Bowel regimen  6. Resume home medications as appropriate  7. Monitor post-op labs  8. DC planning for home when evidence of return of bowel function  9. Diet, CL, ADAT with bowel function return /IVF per surgeon    HTN, HLD  - Continue home statin, norvasc, and benazepril   - Monitor BP q4 hrs  - Holding parameters for BP meds  - PRN hydralazine for SBP >170    MAE  - CPAP at night  - Monitor O2 sats     Seen and examined by me. Agree with above. Discussed with patient and family.     Ginna Hall MD  10/03/17  11:18 PM

## 2017-10-03 NOTE — ANESTHESIA PROCEDURE NOTES
Airway  Urgency: elective    Date/Time: 10/3/2017 9:41 AM  End Time:10/3/2017 9:41 AM  Airway not difficult    General Information and Staff    Patient location during procedure: OR  CRNA: ELEAZAR BECERRIL    Indications and Patient Condition  Indications for airway management: airway protection    Preoxygenated: yes  MILS not maintained throughout  Mask difficulty assessment: 1 - vent by mask    Final Airway Details  Final airway type: endotracheal airway      Successful airway: ETT  Cuffed: yes   Successful intubation technique: direct laryngoscopy  Endotracheal tube insertion site: oral  Blade: Merly  Blade size: #3  ETT size: 7.5 mm  Cormack-Lehane Classification: grade I - full view of glottis  Placement verified by: chest auscultation and capnometry   Cuff volume (mL): 8  Measured from: lips  ETT to lips (cm): 20  Number of attempts at approach: 1    Additional Comments  Negative epigastric sounds, Breath sound equal bilaterally with symmetric chest rise and fall

## 2017-10-03 NOTE — ANESTHESIA PREPROCEDURE EVALUATION
Anesthesia Evaluation     Patient summary reviewed and Nursing notes reviewed   no history of anesthetic complications:  NPO Solid Status: > 8 hours  NPO Liquid Status: > 8 hours     Airway   Mallampati: III  TM distance: >3 FB  Neck ROM: full  possible difficult intubation  Dental      Comment: Lower partial    Pulmonary - normal exam    breath sounds clear to auscultation  (+) a smoker Former, sleep apnea on CPAP,   Cardiovascular   Exercise tolerance: good (4-7 METS)    ECG reviewed  Rhythm: regular  Rate: normal    (+) hypertension well controlled, valvular problems/murmurs, murmur,   (-) angina, SOW      Neuro/Psych  (-) seizures, neuromuscular disease, TIA  GI/Hepatic/Renal/Endo    (+)  hiatal hernia, GERD well controlled,   (-) hepatitis, liver disease, no renal disease, diabetes, hypothyroidism    Musculoskeletal     (+) arthralgias,   Abdominal   (+) obese,    Substance History - negative use     OB/GYN          Other   (+) arthritis   history of cancer (PROSTATE CANCER) active                                    Anesthesia Plan    ASA 3     general   (Labs/studies reviewed  Tap block )  intravenous induction   Anesthetic plan and risks discussed with patient.  Use of blood products discussed with patient  Consented to blood products.   Plan discussed with CRNA.

## 2017-10-03 NOTE — ANESTHESIA POSTPROCEDURE EVALUATION
Patient: Ángel Parikh    Procedure Summary     Date Anesthesia Start Anesthesia Stop Room / Location    10/03/17 0937 1130 [facility] / [facility]       Procedure Diagnosis Surgeon Provider    PROSTATECTOMY LAPAROSCOPIC WITH DAVINCI ROBOT (N/A Abdomen) No diagnosis on file. MD Suraj Rodriguez Jr., MD          Anesthesia Type: general  Last vitals  BP   110/73 (10/03/17 1131)    Temp   97.4 °F (36.3 °C) (10/03/17 1131)    Pulse   82 (10/03/17 1131)   Resp   16 (10/03/17 1131)    SpO2   92 % (10/03/17 1131)      Post Anesthesia Care and Evaluation    Patient location during evaluation: PACU  Patient participation: complete - patient participated  Level of consciousness: awake  Pain score: 0  Pain management: adequate  Airway patency: patent  Anesthetic complications: No anesthetic complications  PONV Status: none  Cardiovascular status: hemodynamically stable and acceptable  Respiratory status: nonlabored ventilation, acceptable and nasal cannula  Hydration status: acceptable

## 2017-10-04 LAB
ANION GAP SERPL CALCULATED.3IONS-SCNC: 7 MMOL/L (ref 3–11)
BUN BLD-MCNC: 13 MG/DL (ref 9–23)
BUN/CREAT SERPL: 14.4 (ref 7–25)
CALCIUM SPEC-SCNC: 8.8 MG/DL (ref 8.7–10.4)
CHLORIDE SERPL-SCNC: 104 MMOL/L (ref 99–109)
CO2 SERPL-SCNC: 26 MMOL/L (ref 20–31)
CREAT BLD-MCNC: 0.9 MG/DL (ref 0.6–1.3)
DEPRECATED RDW RBC AUTO: 48.7 FL (ref 37–54)
ERYTHROCYTE [DISTWIDTH] IN BLOOD BY AUTOMATED COUNT: 14.1 % (ref 11.3–14.5)
GFR SERPL CREATININE-BSD FRML MDRD: 83 ML/MIN/1.73
GLUCOSE BLD-MCNC: 148 MG/DL (ref 70–100)
HCT VFR BLD AUTO: 39.6 % (ref 38.9–50.9)
HGB BLD-MCNC: 13.1 G/DL (ref 13.1–17.5)
MCH RBC QN AUTO: 31.2 PG (ref 27–31)
MCHC RBC AUTO-ENTMCNC: 33.1 G/DL (ref 32–36)
MCV RBC AUTO: 94.3 FL (ref 80–99)
PLATELET # BLD AUTO: 146 10*3/MM3 (ref 150–450)
PMV BLD AUTO: 10 FL (ref 6–12)
POTASSIUM BLD-SCNC: 4.1 MMOL/L (ref 3.5–5.5)
RBC # BLD AUTO: 4.2 10*6/MM3 (ref 4.2–5.76)
SODIUM BLD-SCNC: 137 MMOL/L (ref 132–146)
WBC NRBC COR # BLD: 10.08 10*3/MM3 (ref 3.5–10.8)

## 2017-10-04 PROCEDURE — 25010000002 ENOXAPARIN PER 10 MG: Performed by: UROLOGY

## 2017-10-04 PROCEDURE — 25010000002 HYDROMORPHONE PER 4 MG: Performed by: UROLOGY

## 2017-10-04 PROCEDURE — 85027 COMPLETE CBC AUTOMATED: CPT | Performed by: UROLOGY

## 2017-10-04 PROCEDURE — 63710000001 DIPHENHYDRAMINE PER 50 MG: Performed by: INTERNAL MEDICINE

## 2017-10-04 PROCEDURE — 80048 BASIC METABOLIC PNL TOTAL CA: CPT | Performed by: NURSE PRACTITIONER

## 2017-10-04 PROCEDURE — 25010000002 CEFOXITIN: Performed by: UROLOGY

## 2017-10-04 RX ORDER — HYDROCODONE BITARTRATE AND ACETAMINOPHEN 7.5; 325 MG/1; MG/1
1 TABLET ORAL EVERY 4 HOURS PRN
Status: DISCONTINUED | OUTPATIENT
Start: 2017-10-04 | End: 2017-10-05 | Stop reason: HOSPADM

## 2017-10-04 RX ORDER — DIPHENHYDRAMINE HCL 25 MG
25 CAPSULE ORAL EVERY 6 HOURS PRN
Status: DISCONTINUED | OUTPATIENT
Start: 2017-10-04 | End: 2017-10-05 | Stop reason: HOSPADM

## 2017-10-04 RX ADMIN — HYDROCODONE BITARTRATE AND ACETAMINOPHEN 1 TABLET: 7.5; 325 TABLET ORAL at 19:26

## 2017-10-04 RX ADMIN — CEFOXITIN SODIUM 2 G: 2 POWDER, FOR SOLUTION INTRAVENOUS at 02:47

## 2017-10-04 RX ADMIN — OXYCODONE HYDROCHLORIDE AND ACETAMINOPHEN 1 TABLET: 10; 325 TABLET ORAL at 08:01

## 2017-10-04 RX ADMIN — OXYCODONE HYDROCHLORIDE AND ACETAMINOPHEN 1 TABLET: 10; 325 TABLET ORAL at 15:41

## 2017-10-04 RX ADMIN — HYDROMORPHONE HYDROCHLORIDE 0.5 MG: 1 INJECTION, SOLUTION INTRAMUSCULAR; INTRAVENOUS; SUBCUTANEOUS at 02:47

## 2017-10-04 RX ADMIN — TERAZOSIN HYDROCHLORIDE ANHYDROUS 5 MG: 5 CAPSULE ORAL at 20:34

## 2017-10-04 RX ADMIN — HYDROMORPHONE HYDROCHLORIDE 0.5 MG: 1 INJECTION, SOLUTION INTRAMUSCULAR; INTRAVENOUS; SUBCUTANEOUS at 06:05

## 2017-10-04 RX ADMIN — SODIUM CHLORIDE 100 ML/HR: 9 INJECTION, SOLUTION INTRAVENOUS at 08:01

## 2017-10-04 RX ADMIN — PANTOPRAZOLE SODIUM 40 MG: 40 TABLET, DELAYED RELEASE ORAL at 06:02

## 2017-10-04 RX ADMIN — AMLODIPINE BESYLATE 5 MG: 5 TABLET ORAL at 08:01

## 2017-10-04 RX ADMIN — DIPHENHYDRAMINE HYDROCHLORIDE 25 MG: 25 CAPSULE ORAL at 17:36

## 2017-10-04 RX ADMIN — ATORVASTATIN CALCIUM 20 MG: 20 TABLET, FILM COATED ORAL at 20:34

## 2017-10-04 RX ADMIN — BENAZEPRIL HYDROCHLORIDE 20 MG: 20 TABLET, FILM COATED ORAL at 08:01

## 2017-10-04 RX ADMIN — ENOXAPARIN SODIUM 40 MG: 40 INJECTION SUBCUTANEOUS at 08:01

## 2017-10-04 RX ADMIN — OXYCODONE HYDROCHLORIDE AND ACETAMINOPHEN 1 TABLET: 10; 325 TABLET ORAL at 11:43

## 2017-10-04 RX ADMIN — DOXEPIN HYDROCHLORIDE 50 MG: 50 CAPSULE ORAL at 20:34

## 2017-10-04 NOTE — PROGRESS NOTES
"IM progress note      Ángel Parikh  [id number]  1945     LOS: 1 day     Attending: Jameel Ferguson Jr.*    Primary Care Provider: Perez Pompa MD      Chief Complaint/Reason for visit:  No chief complaint on file.      Subjective   Feels ok. Good pain control. Ambulating. No flatus or bm.     Objective     Vital Signs  Blood pressure 158/95, pulse 104, temperature 98.3 °F (36.8 °C), temperature source Oral, resp. rate 18, height 68\" (172.7 cm), weight 240 lb (109 kg), SpO2 98 %.  Temp (24hrs), Av.6 °F (36.4 °C), Min:97 °F (36.1 °C), Max:98.4 °F (36.9 °C)      Intake/Output:    Intake/Output Summary (Last 24 hours) at 10/04/17 1203  Last data filed at 10/04/17 0800   Gross per 24 hour   Intake          2016.29 ml   Output             2430 ml   Net          -413.71 ml         Physical Exam:     General Appearance:    Alert, cooperative, in no acute distress   Head:    Normocephalic, without obvious abnormality, atraumatic    Lungs:     Normal effort, symmetric chest rise, no crepitus, clear to      auscultation bilaterally                 Heart:    Regular rhythm and normal rate, normal S1 and S2    Abdomen:     Soft. Mild distention and tenderness, expected. MICHELLE with dressing. Incisions benign.    Extremities:   No clubbing, cyanosis or edema.  No deformities.    Pulses:   Pulses palpable and equal bilaterally   Skin:   No bleeding, bruising or rash   Neurologic:   Moves all extremities with no obvious focal motor deficit.  Cranial nerves 2 - 12 grossly intact     Results Review:     I reviewed the patient's new clinical results.     Results from last 7 days  Lab Units 10/04/17  0830 17  1245   WBC 10*3/mm3 10.08 5.40   HEMOGLOBIN g/dL 13.1 14.7   HEMATOCRIT % 39.6 42.6   PLATELETS 10*3/mm3 146* 162       Results from last 7 days  Lab Units 10/04/17  0830 17  1245   SODIUM mmol/L 137 140   POTASSIUM mmol/L 4.1 3.9   CHLORIDE mmol/L 104 107   CO2 mmol/L 26.0 30.0   BUN mg/dL 13 12 "   CREATININE mg/dL 0.90 0.90   CALCIUM mg/dL 8.8 9.3   GLUCOSE mg/dL 148* 117*     I reviewed the patient's new imaging including images and reports.    All medications reviewed.     amLODIPine 5 mg Oral Q24H   atorvastatin 20 mg Oral Nightly   benazepril 20 mg Oral Q24H   doxepin 50 mg Oral Nightly   enoxaparin 40 mg Subcutaneous Daily   pantoprazole 40 mg Oral QAM   terazosin 5 mg Oral Nightly         Assessment/Plan     Principal Problem:    S/P prostatectomy  Active Problems:    Prostate cancer    HTN (hypertension), running a bit high, watch on Norvasc, benazepril and prns.    HLD (hyperlipidemia)    MAE on CPAP      Plan  1. Ambulation. Several times daily.  2. Pain control-prns   3. IS-encouraged  4. DVT proph-mech/lovenox.  5. Bowel regimen  6. Diet/ADAT when bowel function returns.  7. Monitor post-op labs  8. DC planning, home when ready. Discussed with pt , family, and RN.    Ginna Hall MD  10/04/17  12:03 PM

## 2017-10-04 NOTE — PLAN OF CARE
Problem: Patient Care Overview (Adult)  Goal: Plan of Care Review  Outcome: Ongoing (interventions implemented as appropriate)    10/04/17 0828   Coping/Psychosocial Response Interventions   Plan Of Care Reviewed With patient;spouse   Patient Care Overview   Progress improving   Outcome Evaluation   Outcome Summary/Follow up Plan Tolerating oral foods without nausea, accepting of oral over IV pain medications. Has not passed gas at this time       Goal: Adult Individualization and Mutuality  Outcome: Ongoing (interventions implemented as appropriate)    10/04/17 0618   Individualization   Patient Specific Interventions monitor pain q2h and PRN         Problem: Perioperative Period (Adult)  Goal: Signs and Symptoms of Listed Potential Problems Will be Absent or Manageable (Perioperative Period)  Outcome: Ongoing (interventions implemented as appropriate)    10/04/17 0828   Perioperative Period   Problems Assessed (Perioperative Period) all   Problems Present (Perioperative Period) pain

## 2017-10-04 NOTE — PROGRESS NOTES
Discharge Planning Assessment  [facility]     Patient Name: Ángel Parikh  MRN: [id number]  Today's Date: 10/4/2017    Admit Date: 10/3/2017          Discharge Needs Assessment       10/04/17 1352    Living Environment    Lives With spouse    Living Arrangements mobile home    Home Accessibility no concerns    Number of Stairs to Enter Home 3    Stair Railings at Home none    Type of Financial/Environmental Concern none    Transportation Available car;family or friend will provide    Living Environment    Provides Primary Care For no one    Primary Care Provided By spouse/significant other    Quality Of Family Relationships supportive;helpful    Able to Return to Prior Living Arrangements yes    Discharge Needs Assessment    Concerns To Be Addressed no discharge needs identified;denies needs/concerns at this time    Readmission Within The Last 30 Days no previous admission in last 30 days    Anticipated Changes Related to Illness none    Equipment Currently Used at Home bipap/ cpap    Equipment Needed After Discharge none    Discharge Disposition home or self-care            Discharge Plan       10/04/17 1353    Case Management/Social Work Plan    Plan Home     Patient/Family In Agreement With Plan yes    Additional Comments Patient lives in [facility] with his spouse who is supportive and will help with his recovery. He uses a cpap at home but denies any other needs for DME. His goal is to return home and denies any issues or concerns about discharge. CM will follow - Yareli Harrington rn/cm [phone]         Discharge Placement     No information found                Demographic Summary       10/04/17 1344    Referral Information    Admission Type inpatient    Arrived From admitted as an inpatient    Referral Source admission list    Reason For Consult discharge planning    Record Reviewed history and physical;medical record    Contact Information    Permission Granted to Share Information With      Primary Care Physician Information    Name Perez Pompa             Functional Status       10/04/17 0528    Functional Status Current    Current Functional Level Comment Please see nursing notes     Functional Status Prior    Ambulation 0-->independent    Transferring 0-->independent    Toileting 0-->independent    Bathing 0-->independent    Dressing 0-->independent    Eating 0-->independent    Communication 0-->understands/communicates without difficulty    Swallowing 0-->swallows foods/liquids without difficulty    IADL    Medications independent    Meal Preparation independent    Housekeeping independent    Laundry independent    Shopping independent    Oral Care independent    Activity Tolerance    Current Activity Limitations none    Usual Activity Tolerance good    Current Activity Tolerance moderate    Cognitive/Perceptual/Developmental    Current Mental Status/Cognitive Functioning no deficits noted    Employment/Financial    Employment/Finance Comments Medicare A and B            Psychosocial     None            Abuse/Neglect     None            Legal     None            Substance Abuse     None            Patient Forms     None          Yareli Harrington RN

## 2017-10-04 NOTE — PROGRESS NOTES
"Urology    Patient Name: Ángel Parikh  Medical Record Number: [id number]  YOB: 1945     LOS: 1 day   Patient Care Team:  Perez Pompa MD as PCP - General (Family Medicine)  Reggie Scott MD as Consulting Physician (Cardiology)    Chief Complaint:  No chief complaint on file.      Subjective     Interval History:     Patient is doing well today  No flatus  Pain is under good control    Review of Systems:    The following systems were reviewed and negative;  constitution, respiratory, cardiovascular, gastrointestinal and genitourinary    Objective     Vital Signs  /88 (BP Location: Right arm, Patient Position: Lying)  Pulse 104  Temp 98.4 °F (36.9 °C) (Oral)   Resp 16  Ht 68\" (172.7 cm)  Wt 240 lb (109 kg)  SpO2 98%  BMI 36.49 kg/m2  I/O last 3 completed shifts:  In: 3016.3 [I.V.:3016.3]  Out: 2250 [Urine:2075; Other:55; Blood:120]         Physical Exam:  General Appearance: No apparent distress  Abdomen: Abdomen is benign with incisions that are clean dry and intact  Genital: Breaux catheter in place with clear yellow urine  Rectal: Deferred  Neurologic: Neurologically grossly intact     Results Review:     I reviewed the patient's new clinical results.  Lab Results (last 24 hours)     Procedure Component Value Units Date/Time    Tissue Pathology Exam - Tissue, Prostate [[id number]] Collected:  10/03/17 1109    Specimen:  Tissue from Prostate Updated:  10/03/17 1141    POC Glucose Fingerstick [[id number]]  (Abnormal) Collected:  10/03/17 2105    Specimen:  Blood Updated:  10/03/17 2107     Glucose 185 (H) mg/dL     Narrative:       Meter: [id number] : [id number] Rosario Riversssica          Medication Review:    Current Facility-Administered Medications:   •  amLODIPine (NORVASC) tablet 5 mg, 5 mg, Oral, Q24H, IRMA Chester  •  atorvastatin (LIPITOR) tablet 20 mg, 20 mg, Oral, Nightly, Jameel Ferguson Jr., MD, 20 mg at 10/03/17 2023  •  benazepril (LOTENSIN) " tablet 20 mg, 20 mg, Oral, Q24H, IRMA Chester  •  docusate sodium (COLACE) capsule 100 mg, 100 mg, Oral, BID PRN, Jameel Ferguson Jr., MD  •  doxepin (SINEquan) capsule 50 mg, 50 mg, Oral, Nightly, Jameel Ferguson Jr., MD, 50 mg at 10/03/17 2023  •  enoxaparin (LOVENOX) syringe 40 mg, 40 mg, Subcutaneous, Daily, Jameel Ferguson Jr., MD, 40 mg at 10/03/17 1549  •  hydrALAZINE (APRESOLINE) injection 10 mg, 10 mg, Intravenous, Q6H PRN, IRMA Chester, 10 mg at 10/03/17 2330  •  HYDROmorphone (DILAUDID) injection 0.5 mg, 0.5 mg, Intravenous, Q2H PRN, 0.5 mg at 10/04/17 0605 **AND** naloxone (NARCAN) injection 0.1 mg, 0.1 mg, Intravenous, Q5 Min PRN, Jameel Ferguson Jr., MD  •  influenza vac split quad (FLUZONE,FLUARIX,AFLURIA) injection 0.5 mL, 0.5 mL, Intramuscular, During Hospitalization, Jameel Ferguson Jr., MD  •  lactated ringers infusion, 9 mL/hr, Intravenous, Continuous, Indu Van MD, Last Rate: 9 mL/hr at 10/03/17 0727  •  ondansetron (ZOFRAN) tablet 4 mg, 4 mg, Oral, Q6H PRN **OR** ondansetron (ZOFRAN) injection 4 mg, 4 mg, Intravenous, Q6H PRN, Jameel Ferguson Jr., MD, 4 mg at 10/03/17 1452  •  ondansetron (ZOFRAN) injection 4 mg, 4 mg, Intravenous, Q6H PRN, IRMA Chester, 4 mg at 10/03/17 1349  •  oxyCODONE-acetaminophen (PERCOCET)  MG per tablet 1 tablet, 1 tablet, Oral, Q4H PRN, Jameel Ferguson Jr., MD  •  pantoprazole (PROTONIX) EC tablet 40 mg, 40 mg, Oral, QAM, Jameel Ferguson Jr., MD, 40 mg at 10/04/17 0602  •  sodium chloride 0.9 % bolus 500 mL, 500 mL, Intravenous, TID PRN, IRMA Chester  •  sodium chloride 0.9 % infusion, 100 mL/hr, Intravenous, Continuous, Jameel Ferguson Jr., MD, Last Rate: 100 mL/hr at 10/03/17 2205, 100 mL/hr at 10/03/17 2205  •  terazosin (HYTRIN) capsule 5 mg, 5 mg, Oral, Nightly, Jameel Ferguson Jr., MD, 5 mg at 10/03/17 2023    Assessment and Plan    Doing well today    Wait for  return of bowel function prior to considering discharge    Continue Breaux catheter and MICHELLE drain    Principal Problem:    S/P prostatectomy  Active Problems:    Prostate cancer    HTN (hypertension)    HLD (hyperlipidemia)    MAE on CPAP          Plan for disposition: Wait for return of bowel function    Jameel Ferguson Jr., MD  10/04/17  7:56 AM

## 2017-10-04 NOTE — PLAN OF CARE
Problem: Patient Care Overview (Adult)  Goal: Plan of Care Review  Outcome: Ongoing (interventions implemented as appropriate)    10/04/17 0618   Coping/Psychosocial Response Interventions   Plan Of Care Reviewed With patient;spouse   Patient Care Overview   Progress progress toward functional goals is gradual       Goal: Adult Individualization and Mutuality  Outcome: Ongoing (interventions implemented as appropriate)    10/04/17 0618   Individualization   Patient Specific Goals Tolerable level of pain   Patient Specific Interventions monitor pain q2h and PRN       Goal: Discharge Needs Assessment  Outcome: Ongoing (interventions implemented as appropriate)    Problem: Perioperative Period (Adult)  Goal: Signs and Symptoms of Listed Potential Problems Will be Absent or Manageable (Perioperative Period)  Outcome: Ongoing (interventions implemented as appropriate)    10/04/17 0618   Perioperative Period   Problems Assessed (Perioperative Period) all   Problems Present (Perioperative Period) pain;situational response

## 2017-10-05 VITALS
HEART RATE: 95 BPM | SYSTOLIC BLOOD PRESSURE: 146 MMHG | RESPIRATION RATE: 18 BRPM | BODY MASS INDEX: 36.37 KG/M2 | DIASTOLIC BLOOD PRESSURE: 93 MMHG | WEIGHT: 240 LBS | TEMPERATURE: 98.9 F | OXYGEN SATURATION: 93 % | HEIGHT: 68 IN

## 2017-10-05 LAB
CYTO UR: NORMAL
LAB AP CASE REPORT: NORMAL
LAB AP CLINICAL INFORMATION: NORMAL
Lab: NORMAL
PATH REPORT.FINAL DX SPEC: NORMAL
PATH REPORT.GROSS SPEC: NORMAL

## 2017-10-05 PROCEDURE — 25010000002 ENOXAPARIN PER 10 MG: Performed by: UROLOGY

## 2017-10-05 RX ORDER — HYDROCODONE BITARTRATE AND ACETAMINOPHEN 7.5; 325 MG/1; MG/1
1 TABLET ORAL EVERY 6 HOURS PRN
Status: ON HOLD
Start: 2017-10-05 | End: 2021-01-08

## 2017-10-05 RX ORDER — NITROFURANTOIN 25; 75 MG/1; MG/1
100 CAPSULE ORAL 2 TIMES DAILY
Status: ON HOLD
Start: 2017-10-05 | End: 2021-01-08

## 2017-10-05 RX ORDER — PSEUDOEPHEDRINE HCL 30 MG
100 TABLET ORAL 2 TIMES DAILY
Start: 2017-10-05 | End: 2022-06-01

## 2017-10-05 RX ORDER — BISACODYL 10 MG
10 SUPPOSITORY, RECTAL RECTAL ONCE
Status: COMPLETED | OUTPATIENT
Start: 2017-10-05 | End: 2017-10-05

## 2017-10-05 RX ORDER — POLYETHYLENE GLYCOL 3350 17 G/17G
17 POWDER, FOR SOLUTION ORAL DAILY
Status: ON HOLD
Start: 2017-10-05 | End: 2021-11-10

## 2017-10-05 RX ADMIN — AMLODIPINE BESYLATE 5 MG: 5 TABLET ORAL at 09:18

## 2017-10-05 RX ADMIN — ENOXAPARIN SODIUM 40 MG: 40 INJECTION SUBCUTANEOUS at 09:18

## 2017-10-05 RX ADMIN — HYDROCODONE BITARTRATE AND ACETAMINOPHEN 1 TABLET: 7.5; 325 TABLET ORAL at 02:19

## 2017-10-05 RX ADMIN — SODIUM CHLORIDE 100 ML/HR: 9 INJECTION, SOLUTION INTRAVENOUS at 02:20

## 2017-10-05 RX ADMIN — PANTOPRAZOLE SODIUM 40 MG: 40 TABLET, DELAYED RELEASE ORAL at 06:13

## 2017-10-05 RX ADMIN — HYDROCODONE BITARTRATE AND ACETAMINOPHEN 1 TABLET: 7.5; 325 TABLET ORAL at 06:13

## 2017-10-05 RX ADMIN — BISACODYL 10 MG: 10 SUPPOSITORY RECTAL at 09:18

## 2017-10-05 RX ADMIN — BENAZEPRIL HYDROCHLORIDE 20 MG: 20 TABLET, FILM COATED ORAL at 09:17

## 2017-10-05 RX ADMIN — HYDROCODONE BITARTRATE AND ACETAMINOPHEN 1 TABLET: 7.5; 325 TABLET ORAL at 12:33

## 2017-10-05 NOTE — PLAN OF CARE
Problem: Patient Care Overview (Adult)  Goal: Plan of Care Review  Outcome: Ongoing (interventions implemented as appropriate)    10/05/17 0456   Coping/Psychosocial Response Interventions   Plan Of Care Reviewed With patient   Patient Care Overview   Progress improving       Goal: Adult Individualization and Mutuality  Outcome: Ongoing (interventions implemented as appropriate)  Goal: Discharge Needs Assessment  Outcome: Ongoing (interventions implemented as appropriate)    Problem: Perioperative Period (Adult)  Goal: Signs and Symptoms of Listed Potential Problems Will be Absent or Manageable (Perioperative Period)  Outcome: Ongoing (interventions implemented as appropriate)

## 2017-10-05 NOTE — PLAN OF CARE
Problem: Patient Care Overview (Adult)  Goal: Plan of Care Review  Outcome: Ongoing (interventions implemented as appropriate)    10/05/17 1043   Coping/Psychosocial Response Interventions   Plan Of Care Reviewed With patient   Outcome Evaluation   Outcome Summary/Follow up Plan pt ambulating well. has passed flatus x2, tolerating clear liquid diets, advanced diet, pt tolerated at this time. Mix leg bag and mix care education provided to pt and pt's wife, they both verbalized understanding and denied any questions. pt reports he is ready to go home.        Goal: Adult Individualization and Mutuality  Outcome: Ongoing (interventions implemented as appropriate)    10/05/17 1043   Individualization   Patient Specific Preferences denies

## 2017-10-05 NOTE — DISCHARGE SUMMARY
Patient Name: Ángel Parikh  MRN: [id number]  : 1945  DOS: 10/5/2017    Attending: Jameel Ferguson Jr.*    Primary Care Provider: Perez Pompa MD    Date of Admission:.10/3/2017  6:53 AM    Date of Discharge:  10/5/2017    Discharge Diagnosis: Principal Problem:    S/P prostatectomy  Active Problems:    Prostate cancer    HTN (hypertension)    HLD (hyperlipidemia)    MAE on CPAP      Hospital Course  Patient is a 71 y.o. male presented for scheduled surgery by Dr.Slabaugh lyons.  Per his note: ( 71-year-old gentleman with recent diagnosis of low risk prostate cancer.  He understands the treatment options for lowers prostate cancer including active surveillance history is go forward with surgical therapy.  He wishes to proceed with robotic-assisted laparoscopic prostatectomy understanding the risks and benefits therein and gives his full consent.)    Patient tolerated surgery well.  Following his surgery he was admitted to the hospital, he was provided pain medication as needed for pain control, and  received DVT prophylaxis with subcutaneous Lovenox as well as mechanicals.    His home medications were resumed as appropriate, he was started on clear liquid diet until he had evidence of bowel function returned at which point regular diet was introduced.    He was provided a bowel regimen and was encouraged to ambulate frequently which he did.  During his stay he passed flatus   he tolerated his by mouth diet well.    He had a MICHELLE drain postoperatively which has since been removed.  He continues to have a Breaux catheter in place which he will keep attached to a leg bag until his follow-up appointment with urology.  When I saw him today he is doing quite well and he is ready for discharge home.      Procedures Performed  Procedure(s):  PROSTATECTOMY LAPAROSCOPIC WITH DAVINCI ROBOT       Pertinent Test Results:    I reviewed the patient's new clinical results.     Results from last 7 days  Lab Units  10/04/17  0830 09/29/17  1245   WBC 10*3/mm3 10.08 5.40   HEMOGLOBIN g/dL 13.1 14.7   HEMATOCRIT % 39.6 42.6   PLATELETS 10*3/mm3 146* 162       Results from last 7 days  Lab Units 10/04/17  0830 09/29/17  1245   SODIUM mmol/L 137 140   POTASSIUM mmol/L 4.1 3.9   CHLORIDE mmol/L 104 107   CO2 mmol/L 26.0 30.0   BUN mg/dL 13 12   CREATININE mg/dL 0.90 0.90   CALCIUM mg/dL 8.8 9.3   GLUCOSE mg/dL 148* 117*     I reviewed the patient's new imaging including images and reports.  Tissue Pathology Exam - Tissue, Prostate   Order: [id number]   Status:  Final result   Visible to patient:  No (Not Released) Dx:  Prostate cancer      3d ago     Clinical Information       The working history is prostate cancer.    Final Diagnosis   PROSTATE, RADICAL PROSTATECTOMY:  Prostatic adenocarcinoma, Linh score 3+4=7, involving both lobes of the prostate.  No capsular invasion or extraprostatic extension identified.   Surgical margins negative for carcinoma.     PROSTATE RADICAL/ENUCLEATION  TYPE OF SPECIMEN/PROCEDURE:  Radical prostatectomy  PROSTATE SIZE:  46.3 gram (4.2x4.0x3.8 cm)  SPECIMEN INTEGRITY: Intact   LOCATION OF TUMOR:  Bilateral lobes  TUMOR SIZE/ % PROSTATE GLAND INVOLVED:  <5% right; 33% left  LOBES INVOLVED (RIGHT, LEFT, BILATERAL):  Bilateral  PRIMARY PATTERN:  Linh pattern 3 (60% of tumor)  SECONDARY PATTERN: Telferner pattern 4 (40% of tumor)    TERTIARY PATTERN:  Not identified   TOTAL LINH SCORE: 7   CAPSULAR INVASION:  Not identified  EXTRAPROSTATIC EXTENSION:  Not identified   VASCULAR/LYMPHVASCULAR INVASION:  Not identified   PERINEURAL INVASION:  Present   MARGINS INVOLVED:  None   HIGH GRADE PROSTATIC INTRAEPITHELIAL NEOPLASIA:  Present   SEMINAL VESICLE INVOLVEMENT:  Not identified   BLADDER OR RECTAL INVOLVEMENT: Not identified    REGIONAL LYMPH NODE STATUS:  Unknown   EXTRANODAL EXTENSION:  N/A   OTHER METASTATIC SITES:  Unknown   OTHER NEOPLASM SITES:  Unknown   SPECIAL STUDIES:  None   THERAPY  "RELATED CHANGES:  Not identified   ADDITIONAL PATHOLOGIC FINDINGS:  None   OTHER STUDIES:  None   AJCC PATHOLOGIC STAGE:  (COMPLETED BY PATHOLOGIST, BASED ONLY ON TISSUE FINDINGS, MORE EXTENSIVE DISEASE MAY NOT BE KNOWN TO THE PATHOLOGIST)  pT=   2c  pN=   x    pM=  x   AJCC PATHOLOGIC STAGE:  IIB  PCC/mbc    Electronically signed by Conor Castellanos MD on 10/5/2017 at 0745               Discharge Assessment:    Vital Signs  /93 (BP Location: Right arm, Patient Position: Lying)  Pulse 95  Temp 98.9 °F (37.2 °C) (Oral)   Resp 18  Ht 68\" (172.7 cm)  Wt 240 lb (109 kg)  SpO2 93%  BMI 36.49 kg/m2  Temp (24hrs), Av.7 °F (37.1 °C), Min:98.6 °F (37 °C), Max:98.9 °F (37.2 °C)      General Appearance:    Alert, cooperative, in no acute distress   Lungs:     Clear to auscultation,respirations regular, even and                   unlabored    Heart:    Regular rhythm and normal rate, normal S1 and S2    Abdomen:     Soft and benign. Incisions ok. Minimal distention. Obese.   Extremities:   Moves all extremities well, no edema, no cyanosis, no              redness   Pulses:   Pulses palpable and equal bilaterally   Skin:   No bleeding, bruising or rash   :   Breaux with clear urine.       Discharge Disposition:    Discharge Medications   Ángel Parikh   Home Medication Instructions FELICIA:[id number]    Printed on:10/05/17 0913   Medication Information                      amLODIPine-benazepril (LOTREL 5-20) 5-20 MG per capsule  Take 1 capsule by mouth Daily With Breakfast.             aspirin 81 MG EC tablet  Take 81 mg by mouth Daily. Stopped for surgery             atorvastatin (LIPITOR) 20 MG tablet  Take 20 mg by mouth Every Night.             docusate sodium 100 MG capsule  Take 100 mg by mouth 2 (Two) Times a Day.             doxazosin (CARDURA) 4 MG tablet  Take 4 mg by mouth Daily With Breakfast.             doxepin (SINEquan) 50 MG capsule  Take 50 mg by mouth Every Night.           "   HYDROcodone-acetaminophen (NORCO) 7.5-325 MG per tablet  Take 1 tablet by mouth Every 6 (Six) Hours As Needed for Moderate Pain .             lansoprazole (PREVACID) 30 MG capsule  Take 30 mg by mouth 2 (Two) Times a Day.             nitrofurantoin, macrocrystal-monohydrate, (MACROBID) 100 MG capsule  Take 1 capsule by mouth 2 (Two) Times a Day.             polyethylene glycol (MIRALAX) packet  Take 17 g by mouth Daily.             vitamin C (ASCORBIC ACID) 500 MG tablet  Take 500 mg by mouth Daily.                 Discharge Diet: Regular.     Activity at Discharge: ambulate. No lifting, no driving till cleared by urology.    Follow-up Appointments  Dr.Slabaugh lyons per his orders.  Discussed with pt and RN.    Discharge took over 30 min      Ginna Hall MD  10/05/17  9:13 AM

## 2020-06-25 ENCOUNTER — TRANSCRIBE ORDERS (OUTPATIENT)
Dept: ADMINISTRATIVE | Facility: HOSPITAL | Age: 75
End: 2020-06-25

## 2020-06-25 DIAGNOSIS — F17.200 TOBACCO USE DISORDER: Primary | ICD-10-CM

## 2020-07-01 ENCOUNTER — APPOINTMENT (OUTPATIENT)
Dept: ULTRASOUND IMAGING | Facility: HOSPITAL | Age: 75
End: 2020-07-01

## 2021-01-07 ENCOUNTER — APPOINTMENT (OUTPATIENT)
Dept: PREADMISSION TESTING | Facility: HOSPITAL | Age: 76
End: 2021-01-07

## 2021-01-07 ENCOUNTER — TRANSCRIBE ORDERS (OUTPATIENT)
Dept: ADMINISTRATIVE | Facility: HOSPITAL | Age: 76
End: 2021-01-07

## 2021-01-07 DIAGNOSIS — R94.39 ABNORMAL STRESS TEST: Primary | ICD-10-CM

## 2021-01-07 LAB — SARS-COV-2 RNA RESP QL NAA+PROBE: NOT DETECTED

## 2021-01-07 PROCEDURE — U0004 COV-19 TEST NON-CDC HGH THRU: HCPCS

## 2021-01-07 PROCEDURE — C9803 HOPD COVID-19 SPEC COLLECT: HCPCS

## 2021-01-08 ENCOUNTER — HOSPITAL ENCOUNTER (OUTPATIENT)
Facility: HOSPITAL | Age: 76
Discharge: HOME | End: 2021-01-08
Attending: INTERNAL MEDICINE | Admitting: INTERNAL MEDICINE

## 2021-01-08 VITALS
HEIGHT: 66 IN | RESPIRATION RATE: 16 BRPM | OXYGEN SATURATION: 97 % | DIASTOLIC BLOOD PRESSURE: 94 MMHG | WEIGHT: 233.91 LBS | BODY MASS INDEX: 37.59 KG/M2 | SYSTOLIC BLOOD PRESSURE: 112 MMHG | TEMPERATURE: 97.4 F | HEART RATE: 57 BPM

## 2021-01-08 DIAGNOSIS — R94.39 ABNORMAL STRESS TEST: ICD-10-CM

## 2021-01-08 LAB
ANION GAP SERPL CALCULATED.3IONS-SCNC: 10 MMOL/L (ref 5–15)
BUN SERPL-MCNC: 17 MG/DL (ref 8–23)
BUN/CREAT SERPL: 17.5 (ref 7–25)
CALCIUM SPEC-SCNC: 8.6 MG/DL (ref 8.6–10.5)
CHLORIDE SERPL-SCNC: 103 MMOL/L (ref 98–107)
CO2 SERPL-SCNC: 27 MMOL/L (ref 22–29)
CREAT SERPL-MCNC: 0.97 MG/DL (ref 0.76–1.27)
DEPRECATED RDW RBC AUTO: 45.8 FL (ref 37–54)
ERYTHROCYTE [DISTWIDTH] IN BLOOD BY AUTOMATED COUNT: 13.1 % (ref 12.3–15.4)
GFR SERPL CREATININE-BSD FRML MDRD: 76 ML/MIN/1.73
GLUCOSE SERPL-MCNC: 112 MG/DL (ref 65–99)
HCT VFR BLD AUTO: 42.1 % (ref 37.5–51)
HGB BLD-MCNC: 13.8 G/DL (ref 13–17.7)
MCH RBC QN AUTO: 31.2 PG (ref 26.6–33)
MCHC RBC AUTO-ENTMCNC: 32.8 G/DL (ref 31.5–35.7)
MCV RBC AUTO: 95 FL (ref 79–97)
PLATELET # BLD AUTO: 174 10*3/MM3 (ref 140–450)
PMV BLD AUTO: 10.2 FL (ref 6–12)
POTASSIUM SERPL-SCNC: 3.4 MMOL/L (ref 3.5–5.2)
RBC # BLD AUTO: 4.43 10*6/MM3 (ref 4.14–5.8)
SODIUM SERPL-SCNC: 140 MMOL/L (ref 136–145)
WBC # BLD AUTO: 5.17 10*3/MM3 (ref 3.4–10.8)

## 2021-01-08 PROCEDURE — C1894 INTRO/SHEATH, NON-LASER: HCPCS | Performed by: INTERNAL MEDICINE

## 2021-01-08 PROCEDURE — 80048 BASIC METABOLIC PNL TOTAL CA: CPT

## 2021-01-08 PROCEDURE — C1769 GUIDE WIRE: HCPCS | Performed by: INTERNAL MEDICINE

## 2021-01-08 PROCEDURE — 25010000002 PHENYLEPHRINE 10 MG/ML SOLUTION: Performed by: INTERNAL MEDICINE

## 2021-01-08 PROCEDURE — 93458 L HRT ARTERY/VENTRICLE ANGIO: CPT | Performed by: INTERNAL MEDICINE

## 2021-01-08 PROCEDURE — 82565 ASSAY OF CREATININE: CPT

## 2021-01-08 PROCEDURE — 85027 COMPLETE CBC AUTOMATED: CPT

## 2021-01-08 PROCEDURE — 25010000002 MIDAZOLAM PER 1 MG: Performed by: INTERNAL MEDICINE

## 2021-01-08 PROCEDURE — 25010000002 HEPARIN (PORCINE) PER 1000 UNITS: Performed by: INTERNAL MEDICINE

## 2021-01-08 PROCEDURE — 99152 MOD SED SAME PHYS/QHP 5/>YRS: CPT | Performed by: INTERNAL MEDICINE

## 2021-01-08 PROCEDURE — 0 IOPAMIDOL PER 1 ML: Performed by: INTERNAL MEDICINE

## 2021-01-08 PROCEDURE — 25010000002 FENTANYL CITRATE (PF) 100 MCG/2ML SOLUTION: Performed by: INTERNAL MEDICINE

## 2021-01-08 RX ORDER — ASPIRIN 325 MG
325 TABLET, DELAYED RELEASE (ENTERIC COATED) ORAL DAILY
Status: DISCONTINUED | OUTPATIENT
Start: 2021-01-08 | End: 2021-01-08 | Stop reason: HOSPADM

## 2021-01-08 RX ORDER — TEMAZEPAM 7.5 MG/1
7.5 CAPSULE ORAL NIGHTLY PRN
Status: CANCELLED | OUTPATIENT
Start: 2021-01-08 | End: 2021-01-18

## 2021-01-08 RX ORDER — SODIUM CHLORIDE 9 MG/ML
INJECTION, SOLUTION INTRAVENOUS CONTINUOUS PRN
Status: COMPLETED | OUTPATIENT
Start: 2021-01-08 | End: 2021-01-08

## 2021-01-08 RX ORDER — SODIUM CHLORIDE 9 MG/ML
250 INJECTION, SOLUTION INTRAVENOUS CONTINUOUS
Status: CANCELLED | OUTPATIENT
Start: 2021-01-08 | End: 2021-01-08

## 2021-01-08 RX ORDER — LIDOCAINE HYDROCHLORIDE 10 MG/ML
INJECTION, SOLUTION EPIDURAL; INFILTRATION; INTRACAUDAL; PERINEURAL AS NEEDED
Status: DISCONTINUED | OUTPATIENT
Start: 2021-01-08 | End: 2021-01-08 | Stop reason: HOSPADM

## 2021-01-08 RX ORDER — MIDAZOLAM HYDROCHLORIDE 1 MG/ML
INJECTION INTRAMUSCULAR; INTRAVENOUS AS NEEDED
Status: DISCONTINUED | OUTPATIENT
Start: 2021-01-08 | End: 2021-01-08 | Stop reason: HOSPADM

## 2021-01-08 RX ORDER — ALPRAZOLAM 0.25 MG/1
0.25 TABLET ORAL 3 TIMES DAILY PRN
Status: CANCELLED | OUTPATIENT
Start: 2021-01-08 | End: 2021-01-18

## 2021-01-08 RX ORDER — FENTANYL CITRATE 50 UG/ML
INJECTION, SOLUTION INTRAMUSCULAR; INTRAVENOUS AS NEEDED
Status: DISCONTINUED | OUTPATIENT
Start: 2021-01-08 | End: 2021-01-08 | Stop reason: HOSPADM

## 2021-01-08 RX ORDER — PHENYLEPHRINE HYDROCHLORIDE 10 MG/ML
INJECTION INTRAVENOUS AS NEEDED
Status: DISCONTINUED | OUTPATIENT
Start: 2021-01-08 | End: 2021-01-08 | Stop reason: HOSPADM

## 2021-01-08 RX ADMIN — ASPIRIN 325 MG: 325 TABLET, COATED ORAL at 07:46

## 2021-01-08 NOTE — H&P
Pre-Cardiac Catheterization Report  Cardiovascular Laboratory  [facility]      Patient:  Ángel Parikh  :  1945  PCP:  Perez Pompa MD  PHONE:  [phone]    DATE: 2021    BRIEF HPI:  Ángel Parikh is a 75 y.o. male with hypertension and a family history of coronary artery disease.  He is complaining of increasing shortness of breath that is been increasing over the past month.  He states it is moderate to severe lasting minutes with associated shortness of breath, dyspnea on exertion, and chest pain which he describes as an ache.  His symptoms increased with exertion and are decreased with rest.  He recently underwent an abnormal stress test and now presents for left heart catheterization with possible intervention.    Cardiac Risk Factors:  advanced age (older than 55 for men, 65 for women), diabetes mellitus, dyslipidemia, family history of premature cardiovascular disease, hypertension, male gender, obesity (BMI >= 30 kg/m2)    Anginal class in last 2 weeks:  CCS class II    CHF Class in last 2 weeks:  NYHA Class II    Cardiogenic shock:  no    Cardiac arrest <24 hours:  no    Stress test within last 6 months:   yes   Details:    Previous cardiac catheterization:  no  Details:     Previous CABG:  no  Details:      Allergies:     IV contrast allergy:  no  Allergies   Allergen Reactions   • Levaquin [Levofloxacin] Itching   • Percocet [Oxycodone-Acetaminophen]      Chest and back red       MEDICATIONS:  Prior to Admission medications    Medication Sig Start Date End Date Taking? Authorizing Provider   amLODIPine-benazepril (LOTREL 5-20) 5-20 MG per capsule Take 1 capsule by mouth Daily With Breakfast.    ProviderOphelia MD   aspirin 81 MG EC tablet Take 81 mg by mouth Daily. Stopped for surgery    ProviderOphelia MD   atorvastatin (LIPITOR) 20 MG tablet Take 20 mg by mouth Every Night.    ProviderOphelia MD   docusate sodium 100 MG capsule Take 100 mg by  mouth 2 (Two) Times a Day. 10/5/17   Ginna Hall MD   doxazosin (CARDURA) 4 MG tablet Take 4 mg by mouth Daily With Breakfast.    Ophelia Mcgrath MD   doxepin (SINEquan) 50 MG capsule Take 50 mg by mouth Every Night.    Ophelia Mcgrath MD   HYDROcodone-acetaminophen (NORCO) 7.5-325 MG per tablet Take 1 tablet by mouth Every 6 (Six) Hours As Needed for Moderate Pain . 10/5/17   Ginna Hall MD   lansoprazole (PREVACID) 30 MG capsule Take 30 mg by mouth 2 (Two) Times a Day.    Ophelia Mcgrath MD   nitrofurantoin, macrocrystal-monohydrate, (MACROBID) 100 MG capsule Take 1 capsule by mouth 2 (Two) Times a Day. 10/5/17   Ginna Hall MD   polyethylene glycol (MIRALAX) packet Take 17 g by mouth Daily. 10/5/17   Ginna Hall MD   vitamin C (ASCORBIC ACID) 500 MG tablet Take 500 mg by mouth Daily.    ProviderOphelia MD       Past medical & surgical history, social and family history reviewed in the electronic medical record.    ROS:  Cardiovascular ROS: positive for - chest pain, dyspnea on exertion and shortness of breath    Physical Exam:    Vitals: There were no vitals filed for this visit. There were no vitals filed for this visit.    General Appearance:    Alert, cooperative, in no acute distress   Head:    Normocephalic, without obvious abnormality, atraumatic   Eyes:            Lids and lashes normal, conjunctivae and sclerae normal, no   icterus, no pallor, corneas clear, PERRLA   Ears:    Ears appear intact with no abnormalities noted   Neck:   No adenopathy, supple, trachea midline, no thyromegaly, no   carotid bruit, no JVD   Back:     No kyphosis present, no scoliosis present, range of motion normal   Lungs:     Clear to auscultation,respirations regular, even and                unlabored    Heart:    Regular rhythm and normal rate, normal S1 and S2, 3/6 SE         murmur, no gallop, no rub, no click   Chest Wall:    No abnormalities observed   Abdomen:      Normal bowel sounds, no masses, no organomegaly, soft     nontender, nondistended, no guarding, no rebound                tenderness   Rectal:     Deferred   Extremities:   Moves all extremities well, <1+ edema, no cyanosis, no           redness   Pulses:   Pulses palpable and equal bilaterally   Skin:   No bleeding, bruising or rash   Neurologic:   Cranial nerves 2 - 12 grossly intact, sensation intact     Barbaeu Test:  Left: Normal  (oxymetric Allens) Right: Not Assessed             No results found for: CHLPL, TRIG, HDL, LDLDIRECT, AST, ALT        Impression      · Abnormal stress test    Plan     · Procedure to perform: [facility]  · Planned access: Left radial artery              MORENITA Joyce  01/08/21  07:19 EST

## 2021-01-11 LAB — CREAT BLDA-MCNC: 0.9 MG/DL (ref 0.6–1.3)

## 2021-11-10 ENCOUNTER — HOSPITAL ENCOUNTER (OUTPATIENT)
Dept: CARDIOLOGY | Facility: HOSPITAL | Age: 76
Discharge: HOME | End: 2021-11-10
Attending: INTERNAL MEDICINE | Admitting: INTERNAL MEDICINE

## 2021-11-10 VITALS
HEART RATE: 53 BPM | WEIGHT: 233 LBS | OXYGEN SATURATION: 98 % | BODY MASS INDEX: 37.45 KG/M2 | HEIGHT: 66 IN | DIASTOLIC BLOOD PRESSURE: 71 MMHG | SYSTOLIC BLOOD PRESSURE: 99 MMHG

## 2021-11-10 DIAGNOSIS — I48.91 ATRIAL FIBRILLATION, UNSPECIFIED TYPE (HCC): ICD-10-CM

## 2021-11-10 LAB
ANION GAP SERPL CALCULATED.3IONS-SCNC: 8 MMOL/L (ref 5–15)
BUN SERPL-MCNC: 21 MG/DL (ref 8–23)
BUN/CREAT SERPL: 21.6 (ref 7–25)
CALCIUM SPEC-SCNC: 8.5 MG/DL (ref 8.6–10.5)
CHLORIDE SERPL-SCNC: 107 MMOL/L (ref 98–107)
CO2 SERPL-SCNC: 26 MMOL/L (ref 22–29)
CREAT SERPL-MCNC: 0.97 MG/DL (ref 0.76–1.27)
DEPRECATED RDW RBC AUTO: 51.2 FL (ref 37–54)
ERYTHROCYTE [DISTWIDTH] IN BLOOD BY AUTOMATED COUNT: 16.5 % (ref 12.3–15.4)
GFR SERPL CREATININE-BSD FRML MDRD: 75 ML/MIN/1.73
GLUCOSE SERPL-MCNC: 106 MG/DL (ref 65–99)
HCT VFR BLD AUTO: 36.8 % (ref 37.5–51)
HGB BLD-MCNC: 11.6 G/DL (ref 13–17.7)
MAXIMAL PREDICTED HEART RATE: 145 BPM
MCH RBC QN AUTO: 26.9 PG (ref 26.6–33)
MCHC RBC AUTO-ENTMCNC: 31.5 G/DL (ref 31.5–35.7)
MCV RBC AUTO: 85.2 FL (ref 79–97)
PLATELET # BLD AUTO: 212 10*3/MM3 (ref 140–450)
PMV BLD AUTO: 10.6 FL (ref 6–12)
POTASSIUM SERPL-SCNC: 4.5 MMOL/L (ref 3.5–5.2)
QT INTERVAL: 430 MS
QTC INTERVAL: 411 MS
RBC # BLD AUTO: 4.32 10*6/MM3 (ref 4.14–5.8)
SARS-COV-2 RDRP RESP QL NAA+PROBE: NORMAL
SODIUM SERPL-SCNC: 141 MMOL/L (ref 136–145)
STRESS TARGET HR: 123 BPM
WBC # BLD AUTO: 5.47 10*3/MM3 (ref 3.4–10.8)

## 2021-11-10 PROCEDURE — 36415 COLL VENOUS BLD VENIPUNCTURE: CPT

## 2021-11-10 PROCEDURE — 92960 CARDIOVERSION ELECTRIC EXT: CPT

## 2021-11-10 PROCEDURE — 25010000002 FENTANYL CITRATE (PF) 50 MCG/ML SOLUTION: Performed by: INTERNAL MEDICINE

## 2021-11-10 PROCEDURE — 87635 SARS-COV-2 COVID-19 AMP PRB: CPT | Performed by: INTERNAL MEDICINE

## 2021-11-10 PROCEDURE — 80048 BASIC METABOLIC PNL TOTAL CA: CPT | Performed by: INTERNAL MEDICINE

## 2021-11-10 PROCEDURE — 93005 ELECTROCARDIOGRAM TRACING: CPT | Performed by: INTERNAL MEDICINE

## 2021-11-10 PROCEDURE — C9803 HOPD COVID-19 SPEC COLLECT: HCPCS

## 2021-11-10 PROCEDURE — 25010000002 MIDAZOLAM PER 1 MG: Performed by: INTERNAL MEDICINE

## 2021-11-10 PROCEDURE — 85027 COMPLETE CBC AUTOMATED: CPT | Performed by: INTERNAL MEDICINE

## 2021-11-10 RX ORDER — MIDAZOLAM HYDROCHLORIDE 1 MG/ML
INJECTION INTRAMUSCULAR; INTRAVENOUS
Status: DISCONTINUED
Start: 2021-11-10 | End: 2021-11-10 | Stop reason: HOSPADM

## 2021-11-10 RX ORDER — AMIODARONE HYDROCHLORIDE 200 MG/1
200 TABLET ORAL 2 TIMES DAILY
Qty: 30 TABLET | Refills: 5 | Status: SHIPPED | OUTPATIENT
Start: 2021-11-10

## 2021-11-10 RX ORDER — POTASSIUM CHLORIDE 600 MG/1
20 TABLET, FILM COATED, EXTENDED RELEASE ORAL DAILY
COMMUNITY
End: 2022-06-01

## 2021-11-10 RX ORDER — NALOXONE HYDROCHLORIDE 0.4 MG/ML
INJECTION, SOLUTION INTRAMUSCULAR; INTRAVENOUS; SUBCUTANEOUS
Status: DISCONTINUED
Start: 2021-11-10 | End: 2021-11-10 | Stop reason: WASHOUT

## 2021-11-10 RX ORDER — FUROSEMIDE 40 MG/1
40 TABLET ORAL DAILY
COMMUNITY
End: 2022-06-01

## 2021-11-10 RX ORDER — FLUMAZENIL 0.1 MG/ML
INJECTION INTRAVENOUS
Status: DISCONTINUED
Start: 2021-11-10 | End: 2021-11-10 | Stop reason: WASHOUT

## 2021-11-10 RX ORDER — FENTANYL CITRATE 50 UG/ML
INJECTION, SOLUTION INTRAMUSCULAR; INTRAVENOUS
Status: COMPLETED | OUTPATIENT
Start: 2021-11-10 | End: 2021-11-10

## 2021-11-10 RX ORDER — FENTANYL CITRATE 50 UG/ML
INJECTION, SOLUTION INTRAMUSCULAR; INTRAVENOUS
Status: DISCONTINUED
Start: 2021-11-10 | End: 2021-11-10 | Stop reason: HOSPADM

## 2021-11-10 RX ORDER — SACUBITRIL AND VALSARTAN 24; 26 MG/1; MG/1
1 TABLET, FILM COATED ORAL 2 TIMES DAILY
COMMUNITY
End: 2022-06-01

## 2021-11-10 RX ORDER — LANOLIN ALCOHOL/MO/W.PET/CERES
500 CREAM (GRAM) TOPICAL DAILY
COMMUNITY

## 2021-11-10 RX ORDER — MIDAZOLAM HYDROCHLORIDE 1 MG/ML
INJECTION INTRAMUSCULAR; INTRAVENOUS
Status: COMPLETED | OUTPATIENT
Start: 2021-11-10 | End: 2021-11-10

## 2021-11-10 RX ADMIN — MIDAZOLAM 2 MG: 1 INJECTION INTRAMUSCULAR; INTRAVENOUS at 14:03

## 2021-11-10 RX ADMIN — FENTANYL CITRATE 50 MCG: 50 INJECTION, SOLUTION INTRAMUSCULAR; INTRAVENOUS at 14:08

## 2021-11-10 RX ADMIN — FENTANYL CITRATE 50 MCG: 50 INJECTION, SOLUTION INTRAMUSCULAR; INTRAVENOUS at 14:03

## 2021-11-10 RX ADMIN — MIDAZOLAM 2 MG: 1 INJECTION INTRAMUSCULAR; INTRAVENOUS at 14:11

## 2021-11-10 RX ADMIN — MIDAZOLAM 2 MG: 1 INJECTION INTRAMUSCULAR; INTRAVENOUS at 14:08

## 2021-11-10 NOTE — H&P
Pre-ECV Report  Cardiovascular Laboratory  [facility]      Patient:  Ángel Parikh  :  1945  PCP:  Perez Pompa MD  PHONE:  [phone]    DATE: 11/10/2021    BRIEF HPI:  Ángel Parikh is a 76 y.o. male with hypertension hypercholesterolemia, obstructive sleep apnea, obesity, and moderate aortic insufficiency. He also has atrial fibrillation and is chronically anticoagulated with Xarelto. He was recently evaluated in clinic and has been complaining of increasing shortness of breath that is been occurring daily for the past month. He states it is moderate to severe in nature lasting hours with associated dyspnea on exertion, fatigue, palpitations, and edema. His symptoms increased with stress and are relieved with rest. He tells me today that he has been off Xarelto last week for approximately 4 days due to his pharmacy not having. He restarted it 2 days ago. He now presents for DONTE guided cardioversion.    Cardiac Risk Factors:  advanced age (older than 55 for men, 65 for women), dyslipidemia, family history of premature cardiovascular disease, hypertension, male gender, obesity (BMI >= 30 kg/m2)    Anginal class in last 2 weeks:  No anginal symptoms    CHF Class in last 2 weeks:  NYHA Class II    Cardiogenic shock:  no    Cardiac arrest <24 hours:  no    Stress test within last 6 months:   no   Details:    Previous cardiac catheterization:  yes  Details:     Previous CABG:  no  Details:      Allergies:     IV contrast allergy:  no  Allergies   Allergen Reactions   • Levaquin [Levofloxacin] Itching   • Percocet [Oxycodone-Acetaminophen]      Chest and back red       MEDICATIONS:  Prior to Admission medications    Medication Sig Start Date End Date Taking? Authorizing Provider   amLODIPine-benazepril (LOTREL 5-20) 5-20 MG per capsule Take 1 capsule by mouth Daily With Breakfast.    Provider, MD Ophelia   aspirin 81 MG EC tablet Take 81 mg by mouth Daily. Stopped for surgery     Ophelia Mcgrath MD   atorvastatin (LIPITOR) 20 MG tablet Take 20 mg by mouth Every Night.    Ophelia Mcgrath MD   docusate sodium 100 MG capsule Take 100 mg by mouth 2 (Two) Times a Day. 10/5/17   Ginna Hall MD   doxazosin (CARDURA) 4 MG tablet Take 4 mg by mouth Daily With Breakfast.    Ophelia Mcgrath MD   doxepin (SINEquan) 50 MG capsule Take 50 mg by mouth Every Night.    Ophelia Mcgrath MD   lansoprazole (PREVACID) 30 MG capsule Take 30 mg by mouth 2 (Two) Times a Day.    Ophelia Mcgrath MD   polyethylene glycol (MIRALAX) packet Take 17 g by mouth Daily. 10/5/17   Ginna Hall MD   vitamin C (ASCORBIC ACID) 500 MG tablet Take 500 mg by mouth Daily.    Ophelia Mcgrath MD       Past medical & surgical history, social and family history reviewed in the electronic medical record.    ROS:  Cardiovascular ROS: positive for - dyspnea on exertion, edema, irregular heartbeat, palpitations, rapid heart rate and shortness of breath    Physical Exam:    Vitals: There were no vitals filed for this visit. There were no vitals filed for this visit.    General Appearance:    Alert, cooperative, in no acute distress   Head:    Normocephalic, without obvious abnormality, atraumatic   Eyes:            Lids and lashes normal, conjunctivae and sclerae normal, no   icterus, no pallor, corneas clear, PERRLA   Ears:    Ears appear intact with no abnormalities noted   Neck:   No adenopathy, supple, trachea midline, no thyromegaly, no   carotid bruit, no JVD   Back:     No kyphosis present, no scoliosis present, range of motion normal   Lungs:     Clear to auscultation,respirations regular, even and                unlabored    Heart:    Irregular rhythm and normal rate, normal S1 and S2, no         murmur, no gallop, no rub, no click   Chest Wall:    No abnormalities observed   Abdomen:     Normal bowel sounds, no masses, no organomegaly, soft     nontender, nondistended, no  guarding, no rebound                tenderness   Rectal:     Deferred   Extremities:   Moves all extremities well, 1+ edema, no cyanosis, no           redness   Pulses:   Pulses palpable and equal bilaterally   Skin:   No bleeding, bruising or rash   Neurologic:   Cranial nerves 2 - 12 grossly intact, sensation intact     Barbaeu Test:  Left: Not Assessed  (oxymetric Allens) Right: Not Assessed             No results found for: CHLPL, TRIG, HDL, LDLDIRECT, AST, ALT        Impression      · Persistent atrial fibrillation    Plan     · Procedure to perform: DONTE guided external cardioversion                MORENITA Joyce   11/10/21  12:11 EST

## 2022-04-07 RX ORDER — ATORVASTATIN CALCIUM 20 MG/1
TABLET, FILM COATED ORAL
Qty: 90 TABLET | Refills: 3 | Status: SHIPPED | OUTPATIENT
Start: 2022-04-07

## 2022-05-06 ENCOUNTER — TELEPHONE (OUTPATIENT)
Dept: FAMILY MEDICINE CLINIC | Facility: CLINIC | Age: 77
End: 2022-05-06

## 2022-05-06 NOTE — TELEPHONE ENCOUNTER
Incoming Refill Request      Medication requested (name and dose):     NITROFURANTION MONO/MAC CAPS    Pharmacy where request should be sent:     [facility]    Additional details provided by patient:     REQUESTING 90 DAY SUPPLY    Best call back number:     [phone]    Does the patient have less than a 3 day supply:  [] Yes  [x] No    Goldie Trinidad  05/06/22, 09:32 EDT

## 2022-06-01 ENCOUNTER — OFFICE VISIT (OUTPATIENT)
Dept: FAMILY MEDICINE CLINIC | Facility: CLINIC | Age: 77
End: 2022-06-01

## 2022-06-01 VITALS
BODY MASS INDEX: 35.12 KG/M2 | HEART RATE: 58 BPM | OXYGEN SATURATION: 98 % | SYSTOLIC BLOOD PRESSURE: 122 MMHG | DIASTOLIC BLOOD PRESSURE: 70 MMHG | HEIGHT: 69 IN | WEIGHT: 237.1 LBS

## 2022-06-01 DIAGNOSIS — R35.89 POLYURIA: ICD-10-CM

## 2022-06-01 DIAGNOSIS — Z85.46 HISTORY OF PROSTATE CANCER: ICD-10-CM

## 2022-06-01 DIAGNOSIS — I48.0 PAROXYSMAL ATRIAL FIBRILLATION: ICD-10-CM

## 2022-06-01 DIAGNOSIS — R53.83 FATIGUE, UNSPECIFIED TYPE: Primary | ICD-10-CM

## 2022-06-01 DIAGNOSIS — I10 PRIMARY HYPERTENSION: ICD-10-CM

## 2022-06-01 LAB
BILIRUB BLD-MCNC: NEGATIVE MG/DL
CLARITY, POC: CLEAR
COLOR UR: YELLOW
EXPIRATION DATE: ABNORMAL
GLUCOSE UR STRIP-MCNC: NEGATIVE MG/DL
KETONES UR QL: NEGATIVE
LEUKOCYTE EST, POC: NEGATIVE
Lab: ABNORMAL
NITRITE UR-MCNC: NEGATIVE MG/ML
PH UR: 6 [PH] (ref 5–8)
PROT UR STRIP-MCNC: NEGATIVE MG/DL
RBC # UR STRIP: ABNORMAL /UL
SP GR UR: 1.03 (ref 1–1.03)
UROBILINOGEN UR QL: NORMAL

## 2022-06-01 PROCEDURE — 99214 OFFICE O/P EST MOD 30 MIN: CPT | Performed by: FAMILY MEDICINE

## 2022-06-01 PROCEDURE — 81003 URINALYSIS AUTO W/O SCOPE: CPT | Performed by: FAMILY MEDICINE

## 2022-06-01 PROCEDURE — 36415 COLL VENOUS BLD VENIPUNCTURE: CPT | Performed by: FAMILY MEDICINE

## 2022-06-01 RX ORDER — TOLTERODINE 4 MG/1
4 CAPSULE, EXTENDED RELEASE ORAL DAILY
COMMUNITY

## 2022-06-01 NOTE — PROGRESS NOTES
"Chief Complaint  Atrial Fibrillation (Had cardioversion  still tired  soa  Dr Scott Cardiologist)    Subjective          Ángel Parikh presents to [facility]  Patient reports he has had really a lot of fatigue lately tiredness he says he cannot do anything he says his pulse has been running in the 50s and says when he gets up and tries to do anything the last week or so his blood pressures been running about 90/52-90 6/58 he reports he just has felt so tired he just really cannot function.  He states that some of his medications were adjusted recently his metoprolol was decreased from 25-12.5 but he says that really has not helped      Objective   Vital Signs:   /70   Pulse 58   Ht 175.3 cm (69\")   Wt 108 kg (237 lb 1.6 oz)   SpO2 98%   BMI 35.01 kg/m²     Body mass index is 35.01 kg/m².    Review of Systems   Constitutional: Positive for fatigue.   HENT: Negative for congestion, dental problem, ear discharge, ear pain and sore throat.    Respiratory: Negative for apnea, chest tightness and shortness of breath.    Gastrointestinal: Negative for constipation and nausea.   Endocrine: Negative for polyuria.   Genitourinary: Negative for difficulty urinating.   Musculoskeletal: Negative for arthralgias and gait problem.   Skin: Negative for rash.   Neurological: Positive for dizziness.   Hematological: Negative for adenopathy.       Past History:  Medical History: has a past medical history of Anemia (11/22/2005), Arthritis, Cancer (HCC), Colonic polyp, Elevated PSA, Encounter for screening colonoscopy, Essential hypertension, Gastroesophageal reflux disease without esophagitis, H/O valvular heart disease, Hearing loss, Heart murmur, Hemorrhoid, Hiatal hernia, High risk medication use, History of cellulitis, History of tobacco use, Hypercholesterolemia, Hyperglycemia, Hyperlipidemia, Morbid obesity (HCC), Osteoarthritis, Overactive bladder, Primary osteoarthritis involving " multiple joints, Prostate cancer (HCC), Psoriasis, Restless leg syndrome, Sleep apnea with use of continuous positive airway pressure (CPAP), Vitamin B12 deficiency, Vitamin D deficiency, Wears glasses, Wears hearing aid, and Wears partial dentures.   Surgical History: has a past surgical history that includes Colonoscopy (11/10/2015); cystoscopy and needle biopsy prostate; Prostatectomy (N/A, 10/03/2017); Cardiac catheterization (N/A, 01/08/2021); and Colonoscopy (11/22/2005).         Current Outpatient Medications:   •  amiodarone (PACERONE) 200 MG tablet, Take 1 tablet by mouth 2 (Two) Times a Day., Disp: 30 tablet, Rfl: 5  •  aspirin 81 MG EC tablet, Take 81 mg by mouth Daily. Stopped for surgery, Disp: , Rfl:   •  atorvastatin (LIPITOR) 20 MG tablet, TAKE 1 TABLET AT BEDTIME FOR CHOLESTEROL, Disp: 90 tablet, Rfl: 3  •  doxazosin (CARDURA) 4 MG tablet, Take 4 mg by mouth Daily With Breakfast., Disp: , Rfl:   •  doxepin (SINEquan) 50 MG capsule, Take 50 mg by mouth Every Night., Disp: , Rfl:   •  metoprolol tartrate (LOPRESSOR) 25 MG tablet, Take 25 mg by mouth 2 (Two) Times a Day., Disp: , Rfl:   •  rivaroxaban (XARELTO) 20 MG tablet, Take 20 mg by mouth Daily., Disp: , Rfl:   •  tolterodine LA (DETROL LA) 4 MG 24 hr capsule, Take 4 mg by mouth Daily., Disp: , Rfl:   •  vitamin B-12 (CYANOCOBALAMIN) 1000 MCG tablet, Take 500 mcg by mouth Daily., Disp: , Rfl:   •  vitamin C (ASCORBIC ACID) 500 MG tablet, Take 1,000 mg by mouth Daily., Disp: , Rfl:     Allergies: Levaquin [levofloxacin] and Percocet [oxycodone-acetaminophen]    Physical Exam  Constitutional:       Appearance: Normal appearance.   HENT:      Head: Normocephalic.      Right Ear: Tympanic membrane, ear canal and external ear normal.      Left Ear: Tympanic membrane, ear canal and external ear normal.      Nose: Nose normal.      Mouth/Throat:      Pharynx: Oropharynx is clear.   Eyes:      Pupils: Pupils are equal, round, and reactive to light.    Cardiovascular:      Rate and Rhythm: Normal rate and regular rhythm.      Pulses: Normal pulses.      Heart sounds: Murmur heard.   Pulmonary:      Effort: Pulmonary effort is normal.      Breath sounds: Normal breath sounds.   Abdominal:      General: Abdomen is flat. Bowel sounds are normal.      Palpations: Abdomen is soft.   Musculoskeletal:         General: Normal range of motion.   Skin:     General: Skin is warm and dry.   Neurological:      General: No focal deficit present.      Mental Status: He is alert and oriented to person, place, and time.          Result Review :                   Assessment and Plan    Diagnoses and all orders for this visit:    1. Fatigue, unspecified type (Primary)  Comments:  We will get blood work and monitor when I checked his blood pressure today it was 120/74  Orders:  -     CBC & Differential; Future  -     TSH; Future  -     CBC & Differential  -     TSH    2. Paroxysmal atrial fibrillation (HCC)  Comments:  Presently in normal sinus rhythm was cardioverted    3. History of prostate cancer  Comments:  Stable    4. Polyuria  Comments:  Patient urine was evaluated and was normal  Orders:  -     POC Urinalysis Dipstick, Automated    5. Primary hypertension  Comments:  Reports blood pressure of the 90s over 60s today on my check it was 120/74.  We will try it and stop his Doxycin and see how he does also get blood work and mon  Orders:  -     Comprehensive Metabolic Panel; Future  -     Comprehensive Metabolic Panel              Follow Up   Return in about 4 weeks (around 6/29/2022).  Patient was given instructions and counseling regarding his condition or for health maintenance advice. Please see specific information pulled into the AVS if appropriate.     Jack Platt MD

## 2022-06-02 LAB
ALBUMIN SERPL-MCNC: 4.1 G/DL (ref 3.7–4.7)
ALBUMIN/GLOB SERPL: 1.6 {RATIO} (ref 1.2–2.2)
ALP SERPL-CCNC: 60 IU/L (ref 44–121)
ALT SERPL-CCNC: 15 IU/L (ref 0–44)
AST SERPL-CCNC: 16 IU/L (ref 0–40)
BASOPHILS # BLD AUTO: 0 X10E3/UL (ref 0–0.2)
BASOPHILS NFR BLD AUTO: 1 %
BILIRUB SERPL-MCNC: 0.3 MG/DL (ref 0–1.2)
BUN SERPL-MCNC: 23 MG/DL (ref 8–27)
BUN/CREAT SERPL: 19 (ref 10–24)
CALCIUM SERPL-MCNC: 9.1 MG/DL (ref 8.6–10.2)
CHLORIDE SERPL-SCNC: 106 MMOL/L (ref 96–106)
CO2 SERPL-SCNC: 21 MMOL/L (ref 20–29)
CREAT SERPL-MCNC: 1.19 MG/DL (ref 0.76–1.27)
EGFRCR SERPLBLD CKD-EPI 2021: 64 ML/MIN/1.73
EOSINOPHIL # BLD AUTO: 0.2 X10E3/UL (ref 0–0.4)
EOSINOPHIL NFR BLD AUTO: 4 %
ERYTHROCYTE [DISTWIDTH] IN BLOOD BY AUTOMATED COUNT: 15.9 % (ref 11.6–15.4)
GLOBULIN SER CALC-MCNC: 2.5 G/DL (ref 1.5–4.5)
GLUCOSE SERPL-MCNC: 96 MG/DL (ref 65–99)
HCT VFR BLD AUTO: 29.6 % (ref 37.5–51)
HGB BLD-MCNC: 8.8 G/DL (ref 13–17.7)
IMM GRANULOCYTES # BLD AUTO: 0 X10E3/UL (ref 0–0.1)
IMM GRANULOCYTES NFR BLD AUTO: 0 %
LYMPHOCYTES # BLD AUTO: 1 X10E3/UL (ref 0.7–3.1)
LYMPHOCYTES NFR BLD AUTO: 19 %
MCH RBC QN AUTO: 23.5 PG (ref 26.6–33)
MCHC RBC AUTO-ENTMCNC: 29.7 G/DL (ref 31.5–35.7)
MCV RBC AUTO: 79 FL (ref 79–97)
MONOCYTES # BLD AUTO: 0.4 X10E3/UL (ref 0.1–0.9)
MONOCYTES NFR BLD AUTO: 8 %
NEUTROPHILS # BLD AUTO: 3.6 X10E3/UL (ref 1.4–7)
NEUTROPHILS NFR BLD AUTO: 68 %
PLATELET # BLD AUTO: 205 X10E3/UL (ref 150–450)
POTASSIUM SERPL-SCNC: 4.6 MMOL/L (ref 3.5–5.2)
PROT SERPL-MCNC: 6.6 G/DL (ref 6–8.5)
RBC # BLD AUTO: 3.75 X10E6/UL (ref 4.14–5.8)
SODIUM SERPL-SCNC: 143 MMOL/L (ref 134–144)
TSH SERPL DL<=0.005 MIU/L-ACNC: 4.68 UIU/ML (ref 0.45–4.5)
WBC # BLD AUTO: 5.2 X10E3/UL (ref 3.4–10.8)

## 2022-06-06 RX ORDER — DOXAZOSIN MESYLATE 4 MG/1
TABLET ORAL
Qty: 90 TABLET | Refills: 3 | OUTPATIENT
Start: 2022-06-06

## 2022-06-06 RX ORDER — DOXEPIN HYDROCHLORIDE 50 MG/1
CAPSULE ORAL
Qty: 90 CAPSULE | Refills: 3 | OUTPATIENT
Start: 2022-06-06

## 2022-06-10 ENCOUNTER — TELEPHONE (OUTPATIENT)
Dept: FAMILY MEDICINE CLINIC | Facility: CLINIC | Age: 77
End: 2022-06-10

## 2022-06-13 ENCOUNTER — OFFICE VISIT (OUTPATIENT)
Dept: FAMILY MEDICINE CLINIC | Facility: CLINIC | Age: 77
End: 2022-06-13

## 2022-06-13 VITALS
WEIGHT: 234 LBS | HEART RATE: 56 BPM | DIASTOLIC BLOOD PRESSURE: 60 MMHG | BODY MASS INDEX: 36.73 KG/M2 | SYSTOLIC BLOOD PRESSURE: 120 MMHG | OXYGEN SATURATION: 98 % | HEIGHT: 67 IN

## 2022-06-13 DIAGNOSIS — R53.83 OTHER FATIGUE: ICD-10-CM

## 2022-06-13 DIAGNOSIS — D50.8 OTHER IRON DEFICIENCY ANEMIA: Primary | ICD-10-CM

## 2022-06-13 DIAGNOSIS — R06.02 SHORTNESS OF BREATH: ICD-10-CM

## 2022-06-13 PROBLEM — D50.9 IRON DEFICIENCY ANEMIA: Status: ACTIVE | Noted: 2022-06-13

## 2022-06-13 PROCEDURE — 99213 OFFICE O/P EST LOW 20 MIN: CPT | Performed by: PHYSICIAN ASSISTANT

## 2022-06-13 PROCEDURE — 36415 COLL VENOUS BLD VENIPUNCTURE: CPT | Performed by: PHYSICIAN ASSISTANT

## 2022-06-13 RX ORDER — FERROUS SULFATE TAB EC 324 MG (65 MG FE EQUIVALENT) 324 (65 FE) MG
324 TABLET DELAYED RESPONSE ORAL
COMMUNITY
End: 2022-11-07

## 2022-06-14 LAB
BASOPHILS # BLD AUTO: 0 X10E3/UL (ref 0–0.2)
BASOPHILS NFR BLD AUTO: 1 %
EOSINOPHIL # BLD AUTO: 0.3 X10E3/UL (ref 0–0.4)
EOSINOPHIL NFR BLD AUTO: 6 %
ERYTHROCYTE [DISTWIDTH] IN BLOOD BY AUTOMATED COUNT: 18.8 % (ref 11.6–15.4)
FERRITIN SERPL-MCNC: 22 NG/ML (ref 30–400)
HCT VFR BLD AUTO: 28.2 % (ref 37.5–51)
HGB BLD-MCNC: 8.3 G/DL (ref 13–17.7)
IMM GRANULOCYTES # BLD AUTO: 0 X10E3/UL (ref 0–0.1)
IMM GRANULOCYTES NFR BLD AUTO: 0 %
IRON SERPL-MCNC: 17 UG/DL (ref 38–169)
LYMPHOCYTES # BLD AUTO: 0.9 X10E3/UL (ref 0.7–3.1)
LYMPHOCYTES NFR BLD AUTO: 20 %
MCH RBC QN AUTO: 24.1 PG (ref 26.6–33)
MCHC RBC AUTO-ENTMCNC: 29.4 G/DL (ref 31.5–35.7)
MCV RBC AUTO: 82 FL (ref 79–97)
MONOCYTES # BLD AUTO: 0.4 X10E3/UL (ref 0.1–0.9)
MONOCYTES NFR BLD AUTO: 8 %
NEUTROPHILS # BLD AUTO: 2.9 X10E3/UL (ref 1.4–7)
NEUTROPHILS NFR BLD AUTO: 65 %
PLATELET # BLD AUTO: 228 X10E3/UL (ref 150–450)
RBC # BLD AUTO: 3.45 X10E6/UL (ref 4.14–5.8)
WBC # BLD AUTO: 4.5 X10E3/UL (ref 3.4–10.8)

## 2022-06-14 NOTE — ASSESSMENT & PLAN NOTE
We will repeat blood work today and include iron studies.  Advised patient to continue current treatment plan also to restart vitamin C along with his iron tablets.  Discussed signs of worsening symptoms and advised ER should they occur

## 2022-06-14 NOTE — PROGRESS NOTES
"Chief Complaint  Fatigue (Patient reports fatigue and dizziness and fluctuations in blood pressure.)    Subjective        Ángel Parikh presents to [facility]  Patient reports today secondary to continued to have fatigue, shortness of air and some low blood pressures.  Patient reports he has been monitoring his blood pressure daily and he does have some readings that are low in some that are normal.    Patient reports having shortness of air and fatigue states he was advised early this month that he has iron deficiency.  Patient reports he has been taking iron for the past couple weeks however nothing has changed.  Patient believes he feels worse.  Patient denies any active bleeding, black stools, abdominal pain.    Hypertension        Objective   Vital Signs:  /60 (BP Location: Left arm, Patient Position: Sitting, Cuff Size: Adult)   Pulse 56   Ht 170.2 cm (67\")   Wt 106 kg (234 lb)   SpO2 98%   BMI 36.65 kg/m²   Estimated body mass index is 36.65 kg/m² as calculated from the following:    Height as of this encounter: 170.2 cm (67\").    Weight as of this encounter: 106 kg (234 lb).          Physical Exam  Vitals and nursing note reviewed.   Constitutional:       General: He is not in acute distress.     Appearance: Normal appearance.   HENT:      Head: Normocephalic.      Right Ear: Hearing normal.      Left Ear: Hearing normal.   Eyes:      Pupils: Pupils are equal, round, and reactive to light.   Cardiovascular:      Rate and Rhythm: Normal rate and regular rhythm.   Pulmonary:      Effort: Pulmonary effort is normal. No respiratory distress.   Skin:     General: Skin is warm and dry.   Neurological:      Mental Status: He is alert.   Psychiatric:         Mood and Affect: Mood normal.        Result Review :                Assessment and Plan   Diagnoses and all orders for this visit:    1. Other iron deficiency anemia (Primary)  Assessment & Plan:  We will repeat blood " work today and include iron studies.  Advised patient to continue current treatment plan also to restart vitamin C along with his iron tablets.  Discussed signs of worsening symptoms and advised ER should they occur    Orders:  -     CBC & Differential; Future  -     Ferritin; Future  -     Iron; Future  -     CBC & Differential  -     Ferritin  -     Iron  -     Ambulatory Referral to Hematology / Oncology  -     Ambulatory Referral to Gastroenterology    2. Other fatigue  Assessment & Plan:  See plan above this is likely secondary to his anemia      3. Shortness of breath  Assessment & Plan:  See plan above             Follow Up   No follow-ups on file.  Patient was given instructions and counseling regarding his condition or for health maintenance advice. Please see specific information pulled into the AVS if appropriate.

## 2022-06-15 ENCOUNTER — TELEPHONE (OUTPATIENT)
Dept: FAMILY MEDICINE CLINIC | Facility: CLINIC | Age: 77
End: 2022-06-15

## 2022-06-15 NOTE — TELEPHONE ENCOUNTER
Patient called to follow up on referrals submitted by Cookie.  He stated that today he informed whoever he spoke to in the office that for Gastroenterology he would like to go to Dr. Ivan Blank of [place]. Ph: [phone].  For the Hematology referral, he would like to go to Dr. Mirza Chappell.  They are concerned with getting into the hematologist as soon as possible. Wife states that patient's iron is really low, he may need infusions, and he is very tired lately. Patient says that he left a message for Dr. Platt as instructed by him and never received a call back. Patient would like Dr. Platt to see this message.  He would like a call back with an appt to hematologist as soon as possible. Ph: [phone].

## 2022-06-17 ENCOUNTER — APPOINTMENT (OUTPATIENT)
Dept: GENERAL RADIOLOGY | Facility: HOSPITAL | Age: 77
End: 2022-06-17

## 2022-06-17 ENCOUNTER — HOSPITAL ENCOUNTER (EMERGENCY)
Facility: HOSPITAL | Age: 77
Discharge: HOME | End: 2022-06-17
Attending: EMERGENCY MEDICINE | Admitting: EMERGENCY MEDICINE

## 2022-06-17 VITALS
HEIGHT: 67 IN | SYSTOLIC BLOOD PRESSURE: 116 MMHG | WEIGHT: 230 LBS | BODY MASS INDEX: 36.1 KG/M2 | TEMPERATURE: 98 F | OXYGEN SATURATION: 96 % | HEART RATE: 57 BPM | DIASTOLIC BLOOD PRESSURE: 83 MMHG | RESPIRATION RATE: 18 BRPM

## 2022-06-17 DIAGNOSIS — R06.09 EXERTIONAL DYSPNEA: Primary | ICD-10-CM

## 2022-06-17 DIAGNOSIS — Z86.79 HISTORY OF ATRIAL FIBRILLATION: ICD-10-CM

## 2022-06-17 DIAGNOSIS — D64.9 ANEMIA, UNSPECIFIED TYPE: ICD-10-CM

## 2022-06-17 LAB
ALBUMIN SERPL-MCNC: 4.1 G/DL (ref 3.5–5.2)
ALBUMIN/GLOB SERPL: 1.8 G/DL
ALP SERPL-CCNC: 62 U/L (ref 39–117)
ALT SERPL W P-5'-P-CCNC: 13 U/L (ref 1–41)
ANION GAP SERPL CALCULATED.3IONS-SCNC: 9 MMOL/L (ref 5–15)
ANISOCYTOSIS BLD QL: NORMAL
AST SERPL-CCNC: 21 U/L (ref 1–40)
BASOPHILS # BLD AUTO: 0.03 10*3/MM3 (ref 0–0.2)
BASOPHILS NFR BLD AUTO: 0.6 % (ref 0–1.5)
BILIRUB SERPL-MCNC: 0.3 MG/DL (ref 0–1.2)
BILIRUB UR QL STRIP: NEGATIVE
BUN SERPL-MCNC: 21 MG/DL (ref 8–23)
BUN/CREAT SERPL: 20.2 (ref 7–25)
CALCIUM SPEC-SCNC: 9 MG/DL (ref 8.6–10.5)
CHLORIDE SERPL-SCNC: 108 MMOL/L (ref 98–107)
CLARITY UR: CLEAR
CO2 SERPL-SCNC: 25 MMOL/L (ref 22–29)
COLOR UR: YELLOW
CREAT SERPL-MCNC: 1.04 MG/DL (ref 0.76–1.27)
D-LACTATE SERPL-SCNC: 1.3 MMOL/L (ref 0.5–2)
DEPRECATED RDW RBC AUTO: 70.9 FL (ref 37–54)
DEVELOPER EXPIRATION DATE: NORMAL
DEVELOPER LOT NUMBER: NORMAL
EGFRCR SERPLBLD CKD-EPI 2021: 74.9 ML/MIN/1.73
EOSINOPHIL # BLD AUTO: 0.27 10*3/MM3 (ref 0–0.4)
EOSINOPHIL NFR BLD AUTO: 5.1 % (ref 0.3–6.2)
ERYTHROCYTE [DISTWIDTH] IN BLOOD BY AUTOMATED COUNT: 23.3 % (ref 12.3–15.4)
EXPIRATION DATE: NORMAL
FECAL OCCULT BLOOD SCREEN, POC: NEGATIVE
GLOBULIN UR ELPH-MCNC: 2.3 GM/DL
GLUCOSE SERPL-MCNC: 92 MG/DL (ref 65–99)
GLUCOSE UR STRIP-MCNC: NEGATIVE MG/DL
HCT VFR BLD AUTO: 30.2 % (ref 37.5–51)
HGB BLD-MCNC: 8.9 G/DL (ref 13–17.7)
HGB UR QL STRIP.AUTO: NEGATIVE
HOLD SPECIMEN: NORMAL
IMM GRANULOCYTES # BLD AUTO: 0.01 10*3/MM3 (ref 0–0.05)
IMM GRANULOCYTES NFR BLD AUTO: 0.2 % (ref 0–0.5)
KETONES UR QL STRIP: ABNORMAL
LEUKOCYTE ESTERASE UR QL STRIP.AUTO: NEGATIVE
LIPASE SERPL-CCNC: 23 U/L (ref 13–60)
LYMPHOCYTES # BLD AUTO: 1.02 10*3/MM3 (ref 0.7–3.1)
LYMPHOCYTES NFR BLD AUTO: 19.3 % (ref 19.6–45.3)
Lab: NORMAL
MCH RBC QN AUTO: 25.5 PG (ref 26.6–33)
MCHC RBC AUTO-ENTMCNC: 29.5 G/DL (ref 31.5–35.7)
MCV RBC AUTO: 86.5 FL (ref 79–97)
MONOCYTES # BLD AUTO: 0.43 10*3/MM3 (ref 0.1–0.9)
MONOCYTES NFR BLD AUTO: 8.1 % (ref 5–12)
NEGATIVE CONTROL: NEGATIVE
NEUTROPHILS NFR BLD AUTO: 3.52 10*3/MM3 (ref 1.7–7)
NEUTROPHILS NFR BLD AUTO: 66.7 % (ref 42.7–76)
NITRITE UR QL STRIP: NEGATIVE
NRBC BLD AUTO-RTO: 0 /100 WBC (ref 0–0.2)
NT-PROBNP SERPL-MCNC: 193.8 PG/ML (ref 0–1800)
PH UR STRIP.AUTO: <=5 [PH] (ref 5–8)
PLAT MORPH BLD: NORMAL
PLATELET # BLD AUTO: 238 10*3/MM3 (ref 140–450)
PMV BLD AUTO: 10.5 FL (ref 6–12)
POSITIVE CONTROL: POSITIVE
POTASSIUM SERPL-SCNC: 4.1 MMOL/L (ref 3.5–5.2)
PROT SERPL-MCNC: 6.4 G/DL (ref 6–8.5)
PROT UR QL STRIP: NEGATIVE
RBC # BLD AUTO: 3.49 10*6/MM3 (ref 4.14–5.8)
SODIUM SERPL-SCNC: 142 MMOL/L (ref 136–145)
SP GR UR STRIP: 1.03 (ref 1–1.03)
TROPONIN T SERPL-MCNC: <0.01 NG/ML (ref 0–0.03)
UROBILINOGEN UR QL STRIP: ABNORMAL
WBC MORPH BLD: NORMAL
WBC NRBC COR # BLD: 5.28 10*3/MM3 (ref 3.4–10.8)
WHOLE BLOOD HOLD COAG: NORMAL
WHOLE BLOOD HOLD SPECIMEN: NORMAL

## 2022-06-17 PROCEDURE — 85007 BL SMEAR W/DIFF WBC COUNT: CPT | Performed by: EMERGENCY MEDICINE

## 2022-06-17 PROCEDURE — 82270 OCCULT BLOOD FECES: CPT | Performed by: NURSE PRACTITIONER

## 2022-06-17 PROCEDURE — 71045 X-RAY EXAM CHEST 1 VIEW: CPT

## 2022-06-17 PROCEDURE — 93005 ELECTROCARDIOGRAM TRACING: CPT | Performed by: NURSE PRACTITIONER

## 2022-06-17 PROCEDURE — 84484 ASSAY OF TROPONIN QUANT: CPT | Performed by: NURSE PRACTITIONER

## 2022-06-17 PROCEDURE — 83880 ASSAY OF NATRIURETIC PEPTIDE: CPT | Performed by: NURSE PRACTITIONER

## 2022-06-17 PROCEDURE — 80053 COMPREHEN METABOLIC PANEL: CPT

## 2022-06-17 PROCEDURE — 81003 URINALYSIS AUTO W/O SCOPE: CPT | Performed by: NURSE PRACTITIONER

## 2022-06-17 PROCEDURE — 99283 EMERGENCY DEPT VISIT LOW MDM: CPT

## 2022-06-17 PROCEDURE — 83605 ASSAY OF LACTIC ACID: CPT | Performed by: NURSE PRACTITIONER

## 2022-06-17 PROCEDURE — 83690 ASSAY OF LIPASE: CPT | Performed by: NURSE PRACTITIONER

## 2022-06-17 PROCEDURE — 85025 COMPLETE CBC W/AUTO DIFF WBC: CPT | Performed by: EMERGENCY MEDICINE

## 2022-06-17 RX ORDER — SODIUM CHLORIDE 0.9 % (FLUSH) 0.9 %
10 SYRINGE (ML) INJECTION AS NEEDED
Status: DISCONTINUED | OUTPATIENT
Start: 2022-06-17 | End: 2022-06-17 | Stop reason: HOSPADM

## 2022-06-17 NOTE — ED PROVIDER NOTES
EMERGENCY DEPARTMENT ENCOUNTER    Pt Name: Ángel Parikh  MRN: [id number]  Pt :   1945  Room Number:  32/32  Date of encounter:  2022  PCP: Perez Pompa MD  ED Provider: IRMA Guevara    Historian: patient       HPI:  Chief Complaint: sob         Context: Ángel Parikh is a 76 y.o. male who presents to the ED c/o unusual fatigue for 2 days accompanied by dizziness.  2 weeks ago, patient recently saw his primary care provider for some fatigue and was informed that he was anemic.  As a result, he was placed on some iron but his symptoms have increased.    He has a history of congestive heart failure for which he follows Dr. Scott.  He has a history of atrial fibrillation for which he has been cardioverted 3 times.  He has known valve disease.  No history of diabetes.  In a recent visit with his cardiologist, his metoprolol was decreased from twice daily to once daily.  He is anticoagulated on aspirin and Xarelto.    Review of systems is negative for fever or chills.  Negative for chest pain or cough but positive for shortness of breath particularly with exertion.  Negative for nausea, vomiting, diarrhea abdominal pain.  Positive for dark stool since taking iron supplementation.   systems are negative.  Positive for profound weakness with dizziness without syncope.  No neurosensory complaints or focal weakness.      PAST MEDICAL HISTORY  Past Medical History:   Diagnosis Date   • Anemia 2005   • Arthritis     multi joint jose left knee    • Cancer (HCC)     prostate per elevated PSA and biopsies    • Colonic polyp    • Elevated PSA    • Encounter for screening colonoscopy    • Essential hypertension    • Gastroesophageal reflux disease without esophagitis    • H/O valvular heart disease     3 leaky valves- monitored by ISAIAS and Dr Scott (sees every 6 months)   • Hearing loss     wears aid on rt ear    • Heart murmur    • Hemorrhoid     bleeds on occasion    • Hiatal hernia     • High risk medication use    • History of cellulitis    • History of tobacco use     QUIT AT AGE 51   • Hypercholesterolemia    • Hyperglycemia    • Hyperlipidemia    • Morbid obesity (HCC)    • Osteoarthritis    • Overactive bladder    • Primary osteoarthritis involving multiple joints    • Prostate cancer (HCC)    • Psoriasis     spots on legs    • Restless leg syndrome    • Sleep apnea with use of continuous positive airway pressure (CPAP)     7-12 auto setting    • Vitamin B12 deficiency    • Vitamin D deficiency    • Wears glasses    • Wears hearing aid     right    • Wears partial dentures          PAST SURGICAL HISTORY  Past Surgical History:   Procedure Laterality Date   • CARDIAC CATHETERIZATION N/A 2021    Procedure: Left Heart Cath 78634;  Surgeon: Reggie Scott MD;  Location:  [facility];  Service: Cardiovascular;  Laterality: N/A;   • COLONOSCOPY  11/10/2015    MOD SIGMOID DIVERTICULOSIS, NO POLYPS. PLAN: FIBER, REPEAT IN 10 YEARS.   • COLONOSCOPY  2005    DIVERTICULOSIS, INT HEMERRHOIDS. NO POLYPS. EGD- REFLUX, NO BLEEDING. FOLLOW UP CAPSULE ENDOSCOPY AT  -NORMAL   • CYSTOSCOPY AND NEEDLE BIOPSY PROSTATE      twice; last time    • PROSTATECTOMY N/A 10/03/2017    Procedure: PROSTATECTOMY LAPAROSCOPIC WITH DAVINCI ROBOT;  Surgeon: Jameel Ferguson Jr., MD;  Location: [facility] OR;  Service:          FAMILY HISTORY  Family History   Problem Relation Age of Onset   • Stroke Mother    • Hypertension Mother    • Heart attack Father    • Hypertension Father    • Cirrhosis Brother          SOCIAL HISTORY  Social History     Socioeconomic History   • Marital status:    Tobacco Use   • Smoking status: Former Smoker     Years: 20.00     Types: Pipe, Cigars     Quit date:      Years since quittin.4   • Smokeless tobacco: Never Used   • Tobacco comment: smoked 3 cigars a day and pipe off and on    Vaping Use   • Vaping Use: Never used   Substance  and Sexual Activity   • Alcohol use: No   • Drug use: No   • Sexual activity: Defer         ALLERGIES  Levaquin [levofloxacin] and Percocet [oxycodone-acetaminophen]        REVIEW OF SYSTEMS  Review of Systems     All systems reviewed and negative except for those discussed in HPI.       PHYSICAL EXAM    I have reviewed the triage vital signs and nursing notes.    ED Triage Vitals [06/17/22 1215]   Temp Heart Rate Resp BP SpO2   98 °F (36.7 °C) 67 18 161/83 99 %      Temp src Heart Rate Source Patient Position BP Location FiO2 (%)   Oral Monitor Sitting Left arm --       Physical Exam  GENERAL:   Appears in no acute distress.  He is an excellent historian.  His vital signs are normal.  HENT: Nares patent.  EYES: No scleral icterus.  CV: Regular rhythm, regular rate.  No tachycardia.  No peripheral edema  RESPIRATORY: Normal effort.  No audible wheezes, rales or rhonchi.  ABDOMEN: Soft, nontender.  Rectal exam is negative.  Hemoccult is negative.  MUSCULOSKELETAL: No deformities.   NEURO: Alert, moves all extremities, follows commands.  SKIN: Warm, dry, no rash visualized.        LAB RESULTS  Recent Results (from the past 24 hour(s))   Comprehensive Metabolic Panel    Collection Time: 06/17/22 12:22 PM    Specimen: Blood   Result Value Ref Range    Glucose 92 65 - 99 mg/dL    BUN 21 8 - 23 mg/dL    Creatinine 1.04 0.76 - 1.27 mg/dL    Sodium 142 136 - 145 mmol/L    Potassium 4.1 3.5 - 5.2 mmol/L    Chloride 108 (H) 98 - 107 mmol/L    CO2 25.0 22.0 - 29.0 mmol/L    Calcium 9.0 8.6 - 10.5 mg/dL    Total Protein 6.4 6.0 - 8.5 g/dL    Albumin 4.10 3.50 - 5.20 g/dL    ALT (SGPT) 13 1 - 41 U/L    AST (SGOT) 21 1 - 40 U/L    Alkaline Phosphatase 62 39 - 117 U/L    Total Bilirubin 0.3 0.0 - 1.2 mg/dL    Globulin 2.3 gm/dL    A/G Ratio 1.8 g/dL    BUN/Creatinine Ratio 20.2 7.0 - 25.0    Anion Gap 9.0 5.0 - 15.0 mmol/L    eGFR 74.9 >60.0 mL/min/1.73   Green Top (Gel)    Collection Time: 06/17/22 12:22 PM   Result Value Ref  Range    Extra Tube Hold for add-ons.    Lavender Top    Collection Time: 06/17/22 12:22 PM   Result Value Ref Range    Extra Tube hold for add-on    Gold Top - SST    Collection Time: 06/17/22 12:22 PM   Result Value Ref Range    Extra Tube Hold for add-ons.    Light Blue Top    Collection Time: 06/17/22 12:22 PM   Result Value Ref Range    Extra Tube Hold for add-ons.    CBC Auto Differential    Collection Time: 06/17/22 12:22 PM    Specimen: Blood   Result Value Ref Range    WBC 5.28 3.40 - 10.80 10*3/mm3    RBC 3.49 (L) 4.14 - 5.80 10*6/mm3    Hemoglobin 8.9 (L) 13.0 - 17.7 g/dL    Hematocrit 30.2 (L) 37.5 - 51.0 %    MCV 86.5 79.0 - 97.0 fL    MCH 25.5 (L) 26.6 - 33.0 pg    MCHC 29.5 (L) 31.5 - 35.7 g/dL    RDW 23.3 (H) 12.3 - 15.4 %    RDW-SD 70.9 (H) 37.0 - 54.0 fl    MPV 10.5 6.0 - 12.0 fL    Platelets 238 140 - 450 10*3/mm3    Neutrophil % 66.7 42.7 - 76.0 %    Lymphocyte % 19.3 (L) 19.6 - 45.3 %    Monocyte % 8.1 5.0 - 12.0 %    Eosinophil % 5.1 0.3 - 6.2 %    Basophil % 0.6 0.0 - 1.5 %    Immature Grans % 0.2 0.0 - 0.5 %    Neutrophils, Absolute 3.52 1.70 - 7.00 10*3/mm3    Lymphocytes, Absolute 1.02 0.70 - 3.10 10*3/mm3    Monocytes, Absolute 0.43 0.10 - 0.90 10*3/mm3    Eosinophils, Absolute 0.27 0.00 - 0.40 10*3/mm3    Basophils, Absolute 0.03 0.00 - 0.20 10*3/mm3    Immature Grans, Absolute 0.01 0.00 - 0.05 10*3/mm3    nRBC 0.0 0.0 - 0.2 /100 WBC   Scan Slide    Collection Time: 06/17/22 12:22 PM    Specimen: Blood   Result Value Ref Range    Anisocytosis Large/3+ None Seen    WBC Morphology Normal Normal    Platelet Morphology Normal Normal   Lipase    Collection Time: 06/17/22 12:22 PM    Specimen: Blood   Result Value Ref Range    Lipase 23 13 - 60 U/L   Lactic Acid, Plasma    Collection Time: 06/17/22 12:22 PM    Specimen: Blood   Result Value Ref Range    Lactate 1.3 0.5 - 2.0 mmol/L   Troponin    Collection Time: 06/17/22 12:22 PM    Specimen: Blood   Result Value Ref Range    Troponin T <0.010  0.000 - 0.030 ng/mL   BNP    Collection Time: 06/17/22 12:22 PM    Specimen: Blood   Result Value Ref Range    proBNP 193.8 0.0 - 1,800.0 pg/mL   Urinalysis With Microscopic If Indicated (No Culture) - Urine, Clean Catch    Collection Time: 06/17/22  2:13 PM    Specimen: Urine, Clean Catch   Result Value Ref Range    Color, UA Yellow Yellow, Straw    Appearance, UA Clear Clear    pH, UA <=5.0 5.0 - 8.0    Specific Gravity, UA 1.029 1.001 - 1.030    Glucose, UA Negative Negative    Ketones, UA Trace (A) Negative    Bilirubin, UA Negative Negative    Blood, UA Negative Negative    Protein, UA Negative Negative    Leuk Esterase, UA Negative Negative    Nitrite, UA Negative Negative    Urobilinogen, UA 1.0 E.U./dL 0.2 - 1.0 E.U./dL   POC Occult Blood Stool    Collection Time: 06/17/22  2:23 PM    Specimen: Per Rectum; Stool   Result Value Ref Range    Fecal Occult Blood Negative Negative    Lot Number [id number]     Expiration Date 6/24     DEVELOPER LOT NUMBER [id number]     DEVELOPER EXPIRATION DATE 6/24     Positive Control Positive Positive    Negative Control Negative Negative       If labs were ordered, I independently reviewed the results.        RADIOLOGY  XR Chest 1 View    Result Date: 6/17/2022  EXAM: XR CHEST 1 VW-  DATE OF EXAM: 6/17/2022 1:20 PM  INDICATION: SOW.   COMPARISONS: 9/29/2017  FINDINGS:  No evidence of pneumonia, pulmonary edema or other acute pulmonary process. No evidence of pneumothorax or significant pleural effusion. No evidence of acute process of the heart or other mediastinal structures.       Negative chest x-ray. No evidence of acute cardiopulmonary disease.  This report was finalized on 6/17/2022 2:06 PM by Dave Lawson.          PROCEDURES    Procedures    ECG 12 Lead   Preliminary Result             MEDICATIONS GIVEN IN ER    Medications   sodium chloride 0.9 % flush 10 mL (has no administration in time range)   sodium chloride 0.9 % flush 10 mL (has no administration in time range)            ED Course as of 06/17/22 1611   Fri Jun 17, 2022   1311 Hemoglobin(!): 8.9 [MS]   1311 Hematocrit(!): 30.2 [MS]   1430 Hemoglobin(!): 8.9 [MS]   1519 I have paged PA for Dr. Scott.   [MS]   1520 I have personally ambulated Mr. Parikh approximately 15 yards down the bee with a pulse oximeter.  He maintained an oxygen saturation of 96 to 97% throughout his walk. [MS]   1609 I have conferred with the PA on-call for Dr. Sctot's office today.  I have also conferred with Dr. Best.  Patient has stable anemia.  Hemoccult is negative today.  He has had normal vital signs throughout his ED course.  He is anticipating follow-up with gastroenterology for updating his colonoscopy due to the anemia: He understands that the negative Hemoccult today does not replace a colonoscopy since it is gray and greater than 5 years.  He is anticipating follow-up with hematology also but that is in July.  We discussed parameters for concern that would warrant return to the emergency department.  Mr. Parikh and his daughter understand and concur with this outpatient plan [MS]      ED Course User Index  [MS] Keya Hudson, IRMA           AS OF 16:11 EDT VITALS:    BP - 116/83  HR - 57  TEMP - 98 °F (36.7 °C) (Oral)  O2 SATS - 96%                  DIAGNOSIS  Final diagnoses:   Exertional dyspnea   History of atrial fibrillation   Anemia, unspecified type         DISPOSITION    DISCHARGE    Patient discharged in stable condition.    Reviewed implications of results, diagnosis, meds, responsibility to follow up, warning signs and symptoms of possible worsening, potential complications and reasons to return to ER.    Patient/Family voiced understanding of above instructions.    Discussed plan for discharge, as there is no emergent indication for admission.  Pt/family is agreeable and understands need for follow up and possible repeat testing.  Pt/family is aware that discharge does not mean that nothing is wrong but that it  indicates no emergency is currently present that requires admission and they must continue care with follow-up as given below or with a physician of their choice.     FOLLOW-UP  Reggie Scott MD  [address] [id number] [phone]               Medication List      No changes were made to your prescriptions during this visit.              Keya Hudson, APRN  06/25/22 1600

## 2022-06-17 NOTE — DISCHARGE INSTRUCTIONS
Home to rest.  Maintain your very best hydration and nutrition.  Call the office of Dr. Scott on Monday morning for close follow-up.  Maintain your usual medications.  Maintain your very best hydration and nutrition.  Return to the emergency department as needed for worsening symptoms or concerns.  Thank you

## 2022-06-18 LAB
QT INTERVAL: 448 MS
QTC INTERVAL: 432 MS

## 2022-06-21 ENCOUNTER — OFFICE VISIT (OUTPATIENT)
Dept: FAMILY MEDICINE CLINIC | Facility: CLINIC | Age: 77
End: 2022-06-21

## 2022-06-21 VITALS
HEIGHT: 65 IN | HEART RATE: 56 BPM | WEIGHT: 236.8 LBS | BODY MASS INDEX: 39.45 KG/M2 | OXYGEN SATURATION: 98 % | SYSTOLIC BLOOD PRESSURE: 125 MMHG | DIASTOLIC BLOOD PRESSURE: 75 MMHG

## 2022-06-21 DIAGNOSIS — I48.0 PAROXYSMAL ATRIAL FIBRILLATION: ICD-10-CM

## 2022-06-21 DIAGNOSIS — D50.8 OTHER IRON DEFICIENCY ANEMIA: Primary | ICD-10-CM

## 2022-06-21 PROCEDURE — 99214 OFFICE O/P EST MOD 30 MIN: CPT | Performed by: FAMILY MEDICINE

## 2022-06-21 NOTE — PROGRESS NOTES
"Chief Complaint  Anemia    Subjective          Ángel Parikh presents to [facility]  Patient states he did not been feeling very well he went to the ER at at [facility] and then he came back and saw Marry last week his hemoglobin has been staying around 8.3-8 0.8-8.9.  He states he still feeling somewhat fatigued he has started iron and a multivitamin here as well.  He is set up to have endoscopy done however they cannot get him to July 13 he would like to get that changed if at all possible and see what he can do around here because that was set up through [facility] at that time.  He states that he does not want to wait a month to get his endoscopy done and states that he is feeling still weak at this time.  He has not noticed any passing of any blood below and he had a normal colonoscopy 7 years ago      Objective   Vital Signs:   /75   Pulse 56   Ht 165.1 cm (65\")   Wt 107 kg (236 lb 12.8 oz)   SpO2 98%   BMI 39.41 kg/m²     Body mass index is 39.41 kg/m².    Review of Systems   Constitutional: Positive for fatigue.   HENT: Negative for congestion, dental problem, ear discharge, ear pain and sore throat.    Respiratory: Negative for apnea, chest tightness and shortness of breath.    Gastrointestinal: Negative for constipation and nausea.   Endocrine: Negative for polyuria.   Genitourinary: Negative for difficulty urinating.   Musculoskeletal: Negative for arthralgias and gait problem.   Skin: Negative for rash.   Hematological: Negative for adenopathy.       Past History:  Medical History: has a past medical history of Anemia (11/22/2005), Arthritis, Cancer (HCC), Colonic polyp, Elevated PSA, Encounter for screening colonoscopy, Essential hypertension, Gastroesophageal reflux disease without esophagitis, H/O valvular heart disease, Hearing loss, Heart murmur, Hemorrhoid, Hiatal hernia, High risk medication use, History of cellulitis, History of tobacco use, " Hypercholesterolemia, Hyperglycemia, Hyperlipidemia, Morbid obesity (HCC), Osteoarthritis, Overactive bladder, Primary osteoarthritis involving multiple joints, Prostate cancer (HCC), Psoriasis, Restless leg syndrome, Sleep apnea with use of continuous positive airway pressure (CPAP), Vitamin B12 deficiency, Vitamin D deficiency, Wears glasses, Wears hearing aid, and Wears partial dentures.   Surgical History: has a past surgical history that includes Colonoscopy (11/10/2015); cystoscopy and needle biopsy prostate; Prostatectomy (N/A, 10/03/2017); Cardiac catheterization (N/A, 01/08/2021); and Colonoscopy (11/22/2005).         Current Outpatient Medications:   •  amiodarone (PACERONE) 200 MG tablet, Take 1 tablet by mouth 2 (Two) Times a Day., Disp: 30 tablet, Rfl: 5  •  aspirin 81 MG EC tablet, Take 81 mg by mouth Daily. Stopped for surgery, Disp: , Rfl:   •  atorvastatin (LIPITOR) 20 MG tablet, TAKE 1 TABLET AT BEDTIME FOR CHOLESTEROL, Disp: 90 tablet, Rfl: 3  •  doxepin (SINEquan) 50 MG capsule, Take 50 mg by mouth Every Night., Disp: , Rfl:   •  ferrous sulfate 324 (65 Fe) MG tablet delayed-release EC tablet, Take 324 mg by mouth Daily With Breakfast., Disp: , Rfl:   •  metoprolol tartrate (LOPRESSOR) 25 MG tablet, Take 25 mg by mouth 2 (Two) Times a Day., Disp: , Rfl:   •  rivaroxaban (XARELTO) 20 MG tablet, Take 20 mg by mouth Daily., Disp: , Rfl:   •  tolterodine LA (DETROL LA) 4 MG 24 hr capsule, Take 4 mg by mouth Daily., Disp: , Rfl:   •  vitamin B-12 (CYANOCOBALAMIN) 1000 MCG tablet, Take 500 mcg by mouth Daily., Disp: , Rfl:   •  vitamin C (ASCORBIC ACID) 500 MG tablet, Take 1,000 mg by mouth Daily., Disp: , Rfl:     Allergies: Meloxicam, Levofloxacin, and Percocet [oxycodone-acetaminophen]    Physical Exam  Constitutional:       Appearance: Normal appearance.   HENT:      Head: Normocephalic.      Right Ear: Tympanic membrane, ear canal and external ear normal.      Left Ear: Tympanic membrane, ear  canal and external ear normal.      Nose: Nose normal.      Mouth/Throat:      Pharynx: Oropharynx is clear.   Eyes:      Pupils: Pupils are equal, round, and reactive to light.   Cardiovascular:      Rate and Rhythm: Normal rate and regular rhythm.      Pulses: Normal pulses.   Pulmonary:      Effort: Pulmonary effort is normal.      Breath sounds: Normal breath sounds.   Abdominal:      General: Abdomen is flat. Bowel sounds are normal.      Palpations: Abdomen is soft.   Musculoskeletal:         General: Normal range of motion.   Skin:     General: Skin is warm and dry.   Neurological:      General: No focal deficit present.      Mental Status: He is alert and oriented to person, place, and time.          Result Review :                   Assessment and Plan    Diagnoses and all orders for this visit:    1. Other iron deficiency anemia (Primary)  Comments:  Patient given referral to Dr. Matias general surgery here for endoscopy.  He is started a multivitamin and will increase his iron to 2 pills daily.       2. Paroxysmal atrial fibrillation (HCC)  Comments:  His CBC seems to be fairly stable presently work and continue him on his Xarelto we will check his levels in 2 to 3 weeks if he notices any bleeding               Follow Up   Return in about 3 weeks (around 7/12/2022).  Patient was given instructions and counseling regarding his condition or for health maintenance advice. Please see specific information pulled into the AVS if appropriate.     Jack Platt MD

## 2022-06-23 DIAGNOSIS — D50.8 OTHER IRON DEFICIENCY ANEMIA: Primary | ICD-10-CM

## 2022-06-23 NOTE — TELEPHONE ENCOUNTER
Please advise patient that I sent his referrals during his visit on 6/14 so I am unable to modify at this time he can however call the front office and request to speak with our referral coordinator Ana Paula de león regarding his appointments.    Thanks

## 2022-08-08 ENCOUNTER — OFFICE VISIT (OUTPATIENT)
Dept: FAMILY MEDICINE CLINIC | Facility: CLINIC | Age: 77
End: 2022-08-08

## 2022-08-08 VITALS
HEART RATE: 55 BPM | BODY MASS INDEX: 38.9 KG/M2 | OXYGEN SATURATION: 98 % | DIASTOLIC BLOOD PRESSURE: 76 MMHG | HEIGHT: 65 IN | WEIGHT: 233.5 LBS | SYSTOLIC BLOOD PRESSURE: 130 MMHG

## 2022-08-08 DIAGNOSIS — R06.02 SHORTNESS OF BREATH: ICD-10-CM

## 2022-08-08 DIAGNOSIS — D50.8 OTHER IRON DEFICIENCY ANEMIA: Primary | ICD-10-CM

## 2022-08-08 DIAGNOSIS — K29.00 ACUTE GASTRITIS, PRESENCE OF BLEEDING UNSPECIFIED, UNSPECIFIED GASTRITIS TYPE: ICD-10-CM

## 2022-08-08 DIAGNOSIS — I10 PRIMARY HYPERTENSION: ICD-10-CM

## 2022-08-08 PROCEDURE — 36415 COLL VENOUS BLD VENIPUNCTURE: CPT | Performed by: FAMILY MEDICINE

## 2022-08-08 PROCEDURE — 99214 OFFICE O/P EST MOD 30 MIN: CPT | Performed by: FAMILY MEDICINE

## 2022-08-08 RX ORDER — DOXAZOSIN MESYLATE 4 MG/1
TABLET ORAL
COMMUNITY
End: 2023-01-04 | Stop reason: SDUPTHER

## 2022-08-08 NOTE — PROGRESS NOTES
"Chief Complaint  Anemia    Subjective          Ángel Parikh presents to [facility]  Patient comes in today for recheck about his anemia he had his upper and lower GI done reported to have some gastritis in his stomach they thought kebabs to be the etiology for his bleeding however that showed no signs of active bleedings and discussed with him about following this if he continues having more problems consider GI camera follow-through      Objective   Vital Signs:   /76   Pulse 55   Ht 165.1 cm (65\")   Wt 106 kg (233 lb 8 oz)   SpO2 98%   BMI 38.86 kg/m²     Body mass index is 38.86 kg/m².    Review of Systems   Constitutional: Negative.    HENT: Negative for congestion, dental problem, ear discharge, ear pain and sore throat.    Respiratory: Negative for apnea, chest tightness and shortness of breath.    Gastrointestinal: Negative for constipation and nausea.   Endocrine: Negative for polyuria.   Genitourinary: Negative for difficulty urinating.   Musculoskeletal: Negative for arthralgias and gait problem.   Skin: Negative for rash.   Hematological: Negative for adenopathy.       Past History:  Medical History: has a past medical history of Anemia (11/22/2005), Arthritis, Cancer (HCC), Colonic polyp, Elevated PSA, Encounter for screening colonoscopy, Essential hypertension, Gastroesophageal reflux disease without esophagitis, H/O valvular heart disease, Hearing loss, Heart murmur, Hemorrhoid, Hiatal hernia, High risk medication use, History of cellulitis, History of tobacco use, Hypercholesterolemia, Hyperglycemia, Hyperlipidemia, Morbid obesity (HCC), Osteoarthritis, Overactive bladder, Primary osteoarthritis involving multiple joints, Prostate cancer (HCC), Psoriasis, Restless leg syndrome, Sleep apnea with use of continuous positive airway pressure (CPAP), Vitamin B12 deficiency, Vitamin D deficiency, Wears glasses, Wears hearing aid, and Wears partial dentures. "   Surgical History: has a past surgical history that includes Colonoscopy (11/10/2015); cystoscopy and needle biopsy prostate; Prostatectomy (N/A, 10/03/2017); Cardiac catheterization (N/A, 01/08/2021); and Colonoscopy (11/22/2005).         Current Outpatient Medications:   •  amiodarone (PACERONE) 200 MG tablet, Take 1 tablet by mouth 2 (Two) Times a Day., Disp: 30 tablet, Rfl: 5  •  aspirin 81 MG EC tablet, Take 81 mg by mouth Daily. Stopped for surgery, Disp: , Rfl:   •  atorvastatin (LIPITOR) 20 MG tablet, TAKE 1 TABLET AT BEDTIME FOR CHOLESTEROL, Disp: 90 tablet, Rfl: 3  •  doxazosin (CARDURA) 4 MG tablet, doxazosin 4 mg tablet  Take 1 tablet every day by oral route., Disp: , Rfl:   •  doxepin (SINEquan) 50 MG capsule, Take 50 mg by mouth Every Night., Disp: , Rfl:   •  ferrous sulfate 324 (65 Fe) MG tablet delayed-release EC tablet, Take 324 mg by mouth Daily With Breakfast., Disp: , Rfl:   •  Lansoprazole (PREVACID PO), lansoprazole, Disp: , Rfl:   •  metoprolol tartrate (LOPRESSOR) 25 MG tablet, Take 25 mg by mouth 2 (Two) Times a Day., Disp: , Rfl:   •  rivaroxaban (XARELTO) 20 MG tablet, Take 20 mg by mouth Daily., Disp: , Rfl:   •  tolterodine LA (DETROL LA) 4 MG 24 hr capsule, Take 4 mg by mouth Daily., Disp: , Rfl:   •  vitamin B-12 (CYANOCOBALAMIN) 1000 MCG tablet, Take 500 mcg by mouth Daily., Disp: , Rfl:   •  vitamin C (ASCORBIC ACID) 500 MG tablet, Take 1,000 mg by mouth Daily., Disp: , Rfl:     Allergies: Meloxicam, Levofloxacin, and Percocet [oxycodone-acetaminophen]    Physical Exam  Constitutional:       Appearance: Normal appearance.   HENT:      Head: Normocephalic.      Right Ear: Tympanic membrane, ear canal and external ear normal.      Left Ear: Tympanic membrane, ear canal and external ear normal.      Nose: Nose normal.      Mouth/Throat:      Pharynx: Oropharynx is clear.   Eyes:      Pupils: Pupils are equal, round, and reactive to light.   Cardiovascular:      Rate and Rhythm: Normal  rate and regular rhythm.      Pulses: Normal pulses.   Pulmonary:      Effort: Pulmonary effort is normal.      Breath sounds: Normal breath sounds.   Abdominal:      General: Abdomen is flat. Bowel sounds are normal.      Palpations: Abdomen is soft.   Musculoskeletal:         General: Normal range of motion.   Skin:     General: Skin is warm and dry.   Neurological:      General: No focal deficit present.      Mental Status: He is alert and oriented to person, place, and time.          Result Review :                   Assessment and Plan    Diagnoses and all orders for this visit:    1. Other iron deficiency anemia (Primary)  Comments:  Continue to iron daily and continue his other medications and recheck levels  Orders:  -     CBC & Differential; Future  -     Iron; Future    2. Shortness of breath  Comments:  Resolving with improvement in anemia  Orders:  -     Basic Metabolic Panel; Future    3. Acute gastritis, presence of bleeding unspecified, unspecified gastritis type  Comments:  Treated with his PPI now with recent EGD    4. Primary hypertension  Comments:  Stable              Follow Up   No follow-ups on file.  Patient was given instructions and counseling regarding his condition or for health maintenance advice. Please see specific information pulled into the AVS if appropriate.     Jack Platt MD

## 2022-08-09 LAB
BASOPHILS # BLD AUTO: 0.1 X10E3/UL (ref 0–0.2)
BASOPHILS NFR BLD AUTO: 1 %
BUN SERPL-MCNC: 20 MG/DL (ref 8–27)
BUN/CREAT SERPL: 20 (ref 10–24)
CALCIUM SERPL-MCNC: 8.7 MG/DL (ref 8.6–10.2)
CHLORIDE SERPL-SCNC: 107 MMOL/L (ref 96–106)
CO2 SERPL-SCNC: 23 MMOL/L (ref 20–29)
CREAT SERPL-MCNC: 1.02 MG/DL (ref 0.76–1.27)
EGFRCR SERPLBLD CKD-EPI 2021: 77 ML/MIN/1.73
EOSINOPHIL # BLD AUTO: 0.3 X10E3/UL (ref 0–0.4)
EOSINOPHIL NFR BLD AUTO: 6 %
ERYTHROCYTE [DISTWIDTH] IN BLOOD BY AUTOMATED COUNT: 19.8 % (ref 11.6–15.4)
GLUCOSE SERPL-MCNC: 87 MG/DL (ref 65–99)
HCT VFR BLD AUTO: 36.9 % (ref 37.5–51)
HGB BLD-MCNC: 11.8 G/DL (ref 13–17.7)
IMM GRANULOCYTES # BLD AUTO: 0 X10E3/UL (ref 0–0.1)
IMM GRANULOCYTES NFR BLD AUTO: 0 %
IRON SERPL-MCNC: 265 UG/DL (ref 38–169)
LYMPHOCYTES # BLD AUTO: 1.1 X10E3/UL (ref 0.7–3.1)
LYMPHOCYTES NFR BLD AUTO: 21 %
MCH RBC QN AUTO: 29.4 PG (ref 26.6–33)
MCHC RBC AUTO-ENTMCNC: 32 G/DL (ref 31.5–35.7)
MCV RBC AUTO: 92 FL (ref 79–97)
MONOCYTES # BLD AUTO: 0.4 X10E3/UL (ref 0.1–0.9)
MONOCYTES NFR BLD AUTO: 8 %
NEUTROPHILS # BLD AUTO: 3.3 X10E3/UL (ref 1.4–7)
NEUTROPHILS NFR BLD AUTO: 64 %
PLATELET # BLD AUTO: 171 X10E3/UL (ref 150–450)
POTASSIUM SERPL-SCNC: 4 MMOL/L (ref 3.5–5.2)
RBC # BLD AUTO: 4.02 X10E6/UL (ref 4.14–5.8)
SODIUM SERPL-SCNC: 143 MMOL/L (ref 134–144)
WBC # BLD AUTO: 5.2 X10E3/UL (ref 3.4–10.8)

## 2022-08-09 NOTE — PROGRESS NOTES
Your iron levels and your anemia tests are much improved continue iron and I would get this rechecked in 3 months is almost back to normal

## 2022-08-11 ENCOUNTER — TELEPHONE (OUTPATIENT)
Dept: FAMILY MEDICINE CLINIC | Facility: CLINIC | Age: 77
End: 2022-08-11

## 2022-08-11 NOTE — TELEPHONE ENCOUNTER
Caller: Ángel Parikh    Relationship: Self    Best call back number: [phone] TO LEAVE DETAILED MESSAGE OF RESULTS IF NO ANSWER PER PATIENT    Caller requesting test results:SELF    What test was performed: LABS    When was the test performed: 8/8/22 MONDAY    Where was the test performed: UPSTAIRS OF OFFICE    Additional notes: PLEASE CALL WITH RESULTS

## 2022-09-19 ENCOUNTER — TELEPHONE (OUTPATIENT)
Dept: FAMILY MEDICINE CLINIC | Facility: CLINIC | Age: 77
End: 2022-09-19

## 2022-09-19 NOTE — TELEPHONE ENCOUNTER
He still slightly anemic but his iron is up so he can stop his iron right now but I would recheck this level in about 3 months

## 2022-09-19 NOTE — TELEPHONE ENCOUNTER
Caller: Ángel Parikh    Relationship: Self    Best call back number: [phone]    What is the best time to reach you: ANYTIME    Who are you requesting to speak with (clinical staff, provider,  specific staff member): PROVIDER OR CLINICAL STAFF    What was the call regarding: PATIENT STATES HE IS TAKING OTC IRON TABLETS AND HIS IRON IS HIGH NOW AND WANTS TO KNOW IF SHE SHOULD STOP, OR DECREASE THE IRON. PLEASE ADVISE    Do you require a callback: YES

## 2022-10-26 ENCOUNTER — APPOINTMENT (OUTPATIENT)
Dept: GENERAL RADIOLOGY | Facility: HOSPITAL | Age: 77
End: 2022-10-26

## 2022-10-26 ENCOUNTER — HOSPITAL ENCOUNTER (EMERGENCY)
Facility: HOSPITAL | Age: 77
Discharge: HOME | End: 2022-10-26
Attending: EMERGENCY MEDICINE | Admitting: EMERGENCY MEDICINE

## 2022-10-26 VITALS
TEMPERATURE: 97.6 F | OXYGEN SATURATION: 94 % | DIASTOLIC BLOOD PRESSURE: 88 MMHG | BODY MASS INDEX: 36.1 KG/M2 | HEART RATE: 73 BPM | WEIGHT: 230 LBS | RESPIRATION RATE: 16 BRPM | HEIGHT: 67 IN | SYSTOLIC BLOOD PRESSURE: 124 MMHG

## 2022-10-26 DIAGNOSIS — I48.91 ATRIAL FIBRILLATION WITH RVR: Primary | ICD-10-CM

## 2022-10-26 DIAGNOSIS — R06.00 DYSPNEA, UNSPECIFIED TYPE: ICD-10-CM

## 2022-10-26 DIAGNOSIS — R53.83 FATIGUE, UNSPECIFIED TYPE: ICD-10-CM

## 2022-10-26 LAB
ALBUMIN SERPL-MCNC: 4.2 G/DL (ref 3.5–5.2)
ALBUMIN/GLOB SERPL: 1.4 G/DL
ALP SERPL-CCNC: 65 U/L (ref 39–117)
ALT SERPL W P-5'-P-CCNC: 35 U/L (ref 1–41)
ANION GAP SERPL CALCULATED.3IONS-SCNC: 9 MMOL/L (ref 5–15)
AST SERPL-CCNC: 31 U/L (ref 1–40)
BASOPHILS # BLD AUTO: 0.03 10*3/MM3 (ref 0–0.2)
BASOPHILS NFR BLD AUTO: 0.5 % (ref 0–1.5)
BILIRUB SERPL-MCNC: 0.5 MG/DL (ref 0–1.2)
BUN SERPL-MCNC: 24 MG/DL (ref 8–23)
BUN/CREAT SERPL: 18.9 (ref 7–25)
CALCIUM SPEC-SCNC: 8.9 MG/DL (ref 8.6–10.5)
CHLORIDE SERPL-SCNC: 107 MMOL/L (ref 98–107)
CO2 SERPL-SCNC: 28 MMOL/L (ref 22–29)
CREAT SERPL-MCNC: 1.27 MG/DL (ref 0.76–1.27)
DEPRECATED RDW RBC AUTO: 47.7 FL (ref 37–54)
EGFRCR SERPLBLD CKD-EPI 2021: 58.6 ML/MIN/1.73
EOSINOPHIL # BLD AUTO: 0.09 10*3/MM3 (ref 0–0.4)
EOSINOPHIL NFR BLD AUTO: 1.5 % (ref 0.3–6.2)
ERYTHROCYTE [DISTWIDTH] IN BLOOD BY AUTOMATED COUNT: 13.9 % (ref 12.3–15.4)
GLOBULIN UR ELPH-MCNC: 2.9 GM/DL
GLUCOSE SERPL-MCNC: 188 MG/DL (ref 65–99)
HCT VFR BLD AUTO: 44.2 % (ref 37.5–51)
HGB BLD-MCNC: 15.3 G/DL (ref 13–17.7)
HOLD SPECIMEN: NORMAL
IMM GRANULOCYTES # BLD AUTO: 0.01 10*3/MM3 (ref 0–0.05)
IMM GRANULOCYTES NFR BLD AUTO: 0.2 % (ref 0–0.5)
LIPASE SERPL-CCNC: 18 U/L (ref 13–60)
LYMPHOCYTES # BLD AUTO: 0.97 10*3/MM3 (ref 0.7–3.1)
LYMPHOCYTES NFR BLD AUTO: 15.6 % (ref 19.6–45.3)
MCH RBC QN AUTO: 32.6 PG (ref 26.6–33)
MCHC RBC AUTO-ENTMCNC: 34.6 G/DL (ref 31.5–35.7)
MCV RBC AUTO: 94 FL (ref 79–97)
MONOCYTES # BLD AUTO: 0.27 10*3/MM3 (ref 0.1–0.9)
MONOCYTES NFR BLD AUTO: 4.4 % (ref 5–12)
NEUTROPHILS NFR BLD AUTO: 4.83 10*3/MM3 (ref 1.7–7)
NEUTROPHILS NFR BLD AUTO: 77.8 % (ref 42.7–76)
NRBC BLD AUTO-RTO: 0 /100 WBC (ref 0–0.2)
NT-PROBNP SERPL-MCNC: 814 PG/ML (ref 0–1800)
PLATELET # BLD AUTO: 175 10*3/MM3 (ref 140–450)
PMV BLD AUTO: 10.6 FL (ref 6–12)
POTASSIUM SERPL-SCNC: 4 MMOL/L (ref 3.5–5.2)
PROT SERPL-MCNC: 7.1 G/DL (ref 6–8.5)
QT INTERVAL: 354 MS
QTC INTERVAL: 459 MS
RBC # BLD AUTO: 4.7 10*6/MM3 (ref 4.14–5.8)
SODIUM SERPL-SCNC: 144 MMOL/L (ref 136–145)
TROPONIN T SERPL-MCNC: <0.01 NG/ML (ref 0–0.03)
WBC NRBC COR # BLD: 6.2 10*3/MM3 (ref 3.4–10.8)
WHOLE BLOOD HOLD COAG: NORMAL
WHOLE BLOOD HOLD SPECIMEN: NORMAL

## 2022-10-26 PROCEDURE — 80053 COMPREHEN METABOLIC PANEL: CPT | Performed by: EMERGENCY MEDICINE

## 2022-10-26 PROCEDURE — 93005 ELECTROCARDIOGRAM TRACING: CPT

## 2022-10-26 PROCEDURE — 83690 ASSAY OF LIPASE: CPT | Performed by: EMERGENCY MEDICINE

## 2022-10-26 PROCEDURE — 85025 COMPLETE CBC W/AUTO DIFF WBC: CPT | Performed by: EMERGENCY MEDICINE

## 2022-10-26 PROCEDURE — 25010000002 PROPOFOL 10 MG/ML EMULSION: Performed by: EMERGENCY MEDICINE

## 2022-10-26 PROCEDURE — 84484 ASSAY OF TROPONIN QUANT: CPT | Performed by: EMERGENCY MEDICINE

## 2022-10-26 PROCEDURE — 92960 CARDIOVERSION ELECTRIC EXT: CPT

## 2022-10-26 PROCEDURE — 99284 EMERGENCY DEPT VISIT MOD MDM: CPT

## 2022-10-26 PROCEDURE — 83880 ASSAY OF NATRIURETIC PEPTIDE: CPT | Performed by: EMERGENCY MEDICINE

## 2022-10-26 PROCEDURE — 99152 MOD SED SAME PHYS/QHP 5/>YRS: CPT

## 2022-10-26 PROCEDURE — 71045 X-RAY EXAM CHEST 1 VIEW: CPT

## 2022-10-26 PROCEDURE — 93005 ELECTROCARDIOGRAM TRACING: CPT | Performed by: EMERGENCY MEDICINE

## 2022-10-26 RX ORDER — ASPIRIN 81 MG/1
324 TABLET, CHEWABLE ORAL ONCE
Status: COMPLETED | OUTPATIENT
Start: 2022-10-26 | End: 2022-10-26

## 2022-10-26 RX ORDER — PROPOFOL 10 MG/ML
200 VIAL (ML) INTRAVENOUS ONCE
Status: COMPLETED | OUTPATIENT
Start: 2022-10-26 | End: 2022-10-26

## 2022-10-26 RX ORDER — SODIUM CHLORIDE 0.9 % (FLUSH) 0.9 %
10 SYRINGE (ML) INJECTION AS NEEDED
Status: DISCONTINUED | OUTPATIENT
Start: 2022-10-26 | End: 2022-10-26 | Stop reason: HOSPADM

## 2022-10-26 RX ADMIN — PROPOFOL 200 MG: 10 INJECTION, EMULSION INTRAVENOUS at 17:43

## 2022-10-26 RX ADMIN — ASPIRIN 81 MG 324 MG: 81 TABLET ORAL at 17:42

## 2022-11-01 LAB
QT INTERVAL: 436 MS
QTC INTERVAL: 438 MS

## 2022-11-07 ENCOUNTER — OFFICE VISIT (OUTPATIENT)
Dept: FAMILY MEDICINE CLINIC | Facility: CLINIC | Age: 77
End: 2022-11-07

## 2022-11-07 VITALS
OXYGEN SATURATION: 97 % | WEIGHT: 231.7 LBS | BODY MASS INDEX: 36.37 KG/M2 | HEIGHT: 67 IN | HEART RATE: 67 BPM | SYSTOLIC BLOOD PRESSURE: 122 MMHG | DIASTOLIC BLOOD PRESSURE: 72 MMHG

## 2022-11-07 DIAGNOSIS — I48.0 PAROXYSMAL ATRIAL FIBRILLATION: Primary | ICD-10-CM

## 2022-11-07 DIAGNOSIS — R53.83 FATIGUE, UNSPECIFIED TYPE: ICD-10-CM

## 2022-11-07 DIAGNOSIS — R06.02 SHORTNESS OF BREATH: ICD-10-CM

## 2022-11-07 DIAGNOSIS — I10 PRIMARY HYPERTENSION: ICD-10-CM

## 2022-11-07 DIAGNOSIS — D50.8 OTHER IRON DEFICIENCY ANEMIA: ICD-10-CM

## 2022-11-07 PROCEDURE — 99214 OFFICE O/P EST MOD 30 MIN: CPT | Performed by: FAMILY MEDICINE

## 2022-11-07 NOTE — PROGRESS NOTES
"Chief Complaint  iron deficiency (Patient following up )    Subjective          Ángel Parikh presents to [facility]  History of Present Illness  Patient comes in today saying he care for recheck still have some thickened fatigue and tiredness recently he recently was met in the hospital for atrial fibs with rapid ventricular response he was cardioverted there since this time is been really tired does not feel very well continues to do most of his regular activities      Objective   Vital Signs:   /72   Pulse 67   Ht 170.2 cm (67\")   Wt 105 kg (231 lb 11.2 oz)   SpO2 97%   BMI 36.29 kg/m²     Body mass index is 36.29 kg/m².    Review of Systems   Constitutional: Positive for fatigue.   HENT: Negative for congestion, dental problem, ear discharge, ear pain and sore throat.    Respiratory: Positive for shortness of breath. Negative for apnea and chest tightness.    Gastrointestinal: Negative for constipation and nausea.   Endocrine: Negative for polyuria.   Genitourinary: Negative for difficulty urinating.   Musculoskeletal: Positive for arthralgias. Negative for gait problem.   Skin: Negative for rash.   Hematological: Negative for adenopathy.       Past History:  Medical History: has a past medical history of Anemia (11/22/2005), Arthritis, Cancer (HCC), Colonic polyp, Elevated PSA, Encounter for screening colonoscopy, Essential hypertension, Gastroesophageal reflux disease without esophagitis, H/O valvular heart disease, Hearing loss, Heart murmur, Hemorrhoid, Hiatal hernia, High risk medication use, History of cellulitis, History of tobacco use, Hypercholesterolemia, Hyperglycemia, Hyperlipidemia, Morbid obesity (HCC), Osteoarthritis, Overactive bladder, Primary osteoarthritis involving multiple joints, Prostate cancer (HCC), Psoriasis, Restless leg syndrome, Sleep apnea with use of continuous positive airway pressure (CPAP), Vitamin B12 deficiency, Vitamin D deficiency, " Wears glasses, Wears hearing aid, and Wears partial dentures.   Surgical History: has a past surgical history that includes Colonoscopy (11/10/2015); cystoscopy and needle biopsy prostate; Prostatectomy (N/A, 10/03/2017); Cardiac catheterization (N/A, 01/08/2021); and Colonoscopy (11/22/2005).         Current Outpatient Medications:   •  amiodarone (PACERONE) 200 MG tablet, Take 1 tablet by mouth 2 (Two) Times a Day., Disp: 30 tablet, Rfl: 5  •  aspirin 81 MG EC tablet, Take 81 mg by mouth Daily. Stopped for surgery, Disp: , Rfl:   •  atorvastatin (LIPITOR) 20 MG tablet, TAKE 1 TABLET AT BEDTIME FOR CHOLESTEROL, Disp: 90 tablet, Rfl: 3  •  doxazosin (CARDURA) 4 MG tablet, doxazosin 4 mg tablet  Take 1 tablet every day by oral route., Disp: , Rfl:   •  metoprolol tartrate (LOPRESSOR) 25 MG tablet, Take 25 mg by mouth 2 (Two) Times a Day., Disp: , Rfl:   •  rivaroxaban (XARELTO) 20 MG tablet, Take 20 mg by mouth Daily., Disp: , Rfl:   •  tolterodine LA (DETROL LA) 4 MG 24 hr capsule, Take 4 mg by mouth Daily., Disp: , Rfl:   •  vitamin B-12 (CYANOCOBALAMIN) 1000 MCG tablet, Take 500 mcg by mouth Daily., Disp: , Rfl:   •  vitamin C (ASCORBIC ACID) 500 MG tablet, Take 1,000 mg by mouth Daily., Disp: , Rfl:   •  doxepin (SINEquan) 50 MG capsule, Take 50 mg by mouth Every Night., Disp: , Rfl:   •  Lansoprazole (PREVACID PO), lansoprazole, Disp: , Rfl:     Allergies: Meloxicam, Levofloxacin, and Percocet [oxycodone-acetaminophen]    Physical Exam  Vitals reviewed.   Constitutional:       Appearance: Normal appearance.   HENT:      Head: Normocephalic.      Right Ear: Tympanic membrane, ear canal and external ear normal.      Left Ear: Tympanic membrane, ear canal and external ear normal.      Nose: Nose normal.      Mouth/Throat:      Pharynx: Oropharynx is clear.   Eyes:      Pupils: Pupils are equal, round, and reactive to light.   Cardiovascular:      Rate and Rhythm: Normal rate and regular rhythm.      Pulses: Normal  pulses.   Pulmonary:      Effort: Pulmonary effort is normal.      Breath sounds: Normal breath sounds.   Abdominal:      General: Abdomen is flat. Bowel sounds are normal.      Palpations: Abdomen is soft.   Musculoskeletal:         General: Normal range of motion.   Skin:     General: Skin is warm and dry.   Neurological:      General: No focal deficit present.      Mental Status: He is alert and oriented to person, place, and time.          Result Review :                   Assessment and Plan    Diagnoses and all orders for this visit:    1. Paroxysmal atrial fibrillation (HCC) (Primary)  Comments:  Sinus rhythm presently stable continue medications    2. Shortness of breath  Comments:  Stable recently work-up has been normal probably multifactorial    3. Primary hypertension  Comments:  Stable presently    4. Other iron deficiency anemia  Comments:  Repeat blood work in 3 months    5. Fatigue, unspecified type  Comments:  Multifactorial monitor continue meds              Follow Up   No follow-ups on file.  Patient was given instructions and counseling regarding his condition or for health maintenance advice. Please see specific information pulled into the AVS if appropriate.     Jack Platt MD

## 2023-01-04 ENCOUNTER — TELEPHONE (OUTPATIENT)
Dept: FAMILY MEDICINE CLINIC | Facility: CLINIC | Age: 78
End: 2023-01-04
Payer: MEDICARE

## 2023-01-04 RX ORDER — DOXAZOSIN MESYLATE 4 MG/1
4 TABLET ORAL NIGHTLY
Qty: 90 TABLET | Refills: 0 | Status: SHIPPED | OUTPATIENT
Start: 2023-01-04 | End: 2023-04-04

## 2023-01-04 NOTE — TELEPHONE ENCOUNTER
Patient is requesting a refill for Doxazosin 4 mg to be sent to [facility].  He states that he is completely out. If any questions, ph: [phone]

## 2023-01-24 RX ORDER — FLUTICASONE PROPIONATE 50 MCG
2 SPRAY, SUSPENSION (ML) NASAL DAILY
COMMUNITY
End: 2023-01-24 | Stop reason: SDUPTHER

## 2023-01-24 RX ORDER — FLUTICASONE PROPIONATE 50 MCG
2 SPRAY, SUSPENSION (ML) NASAL DAILY
Qty: 60 ML | Refills: 2 | Status: SHIPPED | OUTPATIENT
Start: 2023-01-24

## 2023-01-24 NOTE — TELEPHONE ENCOUNTER
Caller: [facility] - [place], [place] - [address] [place] [address] - [phone] [facility] [phone] FX    Relationship: Pharmacy    Best call back number: [phone]  Requested Prescriptions:   Requested Prescriptions     Pending Prescriptions Disp Refills   • fluticasone (FLONASE) 50 MCG/ACT nasal spray       Si sprays into the nostril(s) as directed by provider Daily.        Pharmacy where request should be sent: [facility] - [place], [place] - [address] [place] [address] - [phone] [facility] [phone] FX   [phone]      Additional details provided by patient: PLEASE REFILL 90 DAY SUPPLY   Does the patient have less than a 3 day supply:  [] Yes  [x] No    Would you like a call back once the refill request has been completed: [] Yes [x] No    If the office needs to give you a call back, can they leave a voicemail: [] Yes [x] No    Dominga Luciano Rep   23 11:25 EST

## 2023-02-06 ENCOUNTER — OFFICE VISIT (OUTPATIENT)
Dept: FAMILY MEDICINE CLINIC | Facility: CLINIC | Age: 78
End: 2023-02-06
Payer: MEDICARE

## 2023-02-06 VITALS
HEIGHT: 67 IN | SYSTOLIC BLOOD PRESSURE: 125 MMHG | WEIGHT: 234.2 LBS | HEART RATE: 64 BPM | BODY MASS INDEX: 36.76 KG/M2 | OXYGEN SATURATION: 98 % | DIASTOLIC BLOOD PRESSURE: 72 MMHG

## 2023-02-06 DIAGNOSIS — E53.8 B12 DEFICIENCY: ICD-10-CM

## 2023-02-06 DIAGNOSIS — D50.8 OTHER IRON DEFICIENCY ANEMIA: Primary | ICD-10-CM

## 2023-02-06 DIAGNOSIS — G47.33 OBSTRUCTIVE SLEEP APNEA SYNDROME: ICD-10-CM

## 2023-02-06 DIAGNOSIS — I10 PRIMARY HYPERTENSION: ICD-10-CM

## 2023-02-06 DIAGNOSIS — R73.9 HYPERGLYCEMIA: ICD-10-CM

## 2023-02-06 DIAGNOSIS — I48.0 PAROXYSMAL ATRIAL FIBRILLATION: ICD-10-CM

## 2023-02-06 PROCEDURE — 99214 OFFICE O/P EST MOD 30 MIN: CPT | Performed by: FAMILY MEDICINE

## 2023-02-06 PROCEDURE — 36415 COLL VENOUS BLD VENIPUNCTURE: CPT | Performed by: FAMILY MEDICINE

## 2023-02-06 RX ORDER — DOXAZOSIN MESYLATE 4 MG/1
TABLET ORAL EVERY 24 HOURS
COMMUNITY
End: 2023-02-06

## 2023-02-06 NOTE — PROGRESS NOTES
"Chief Complaint  iron deficeiency anemia    Subjective          Ángel Parikh presents to [facility]  History of Present Illness  Patient comes in today saying still related fatigue he has stopped using his CPAP machine and about for the last 2 months he says otherwise that he is done well he denies any diffident problems this time occasionally does have a little further for his heart but states he is done well otherwise      Objective   Vital Signs:   /72   Pulse 64   Ht 170.2 cm (67\")   Wt 106 kg (234 lb 3.2 oz)   SpO2 98%   BMI 36.68 kg/m²     Body mass index is 36.68 kg/m².    Review of Systems   Constitutional: Positive for fatigue.   HENT: Negative for congestion, dental problem, ear discharge, ear pain and sore throat.    Respiratory: Negative for apnea, chest tightness and shortness of breath.    Gastrointestinal: Negative for constipation and nausea.   Endocrine: Negative for polyuria.   Genitourinary: Negative for difficulty urinating.   Musculoskeletal: Negative for arthralgias and gait problem.   Skin: Negative for rash.   Hematological: Negative for adenopathy.       Past History:  Medical History: has a past medical history of Anemia (11/22/2005), Arthritis, Cancer (HCC), Colonic polyp, Elevated PSA, Encounter for screening colonoscopy, Essential hypertension, Gastroesophageal reflux disease without esophagitis, H/O valvular heart disease, Hearing loss, Heart murmur, Hemorrhoid, Hiatal hernia, High risk medication use, History of cellulitis, History of tobacco use, Hypercholesterolemia, Hyperglycemia, Hyperlipidemia, Morbid obesity (HCC), Osteoarthritis, Overactive bladder, Primary osteoarthritis involving multiple joints, Prostate cancer (HCC), Psoriasis, Restless leg syndrome, Sleep apnea with use of continuous positive airway pressure (CPAP), Vitamin B12 deficiency, Vitamin D deficiency, Wears glasses, Wears hearing aid, and Wears partial dentures. "   Surgical History: has a past surgical history that includes Colonoscopy (11/10/2015); cystoscopy and needle biopsy prostate; Prostatectomy (N/A, 10/03/2017); Cardiac catheterization (N/A, 01/08/2021); and Colonoscopy (11/22/2005).         Current Outpatient Medications:   •  amiodarone (PACERONE) 200 MG tablet, Take 1 tablet by mouth 2 (Two) Times a Day., Disp: 30 tablet, Rfl: 5  •  aspirin 81 MG EC tablet, Take 81 mg by mouth Daily. Stopped for surgery, Disp: , Rfl:   •  atorvastatin (LIPITOR) 20 MG tablet, TAKE 1 TABLET AT BEDTIME FOR CHOLESTEROL, Disp: 90 tablet, Rfl: 3  •  doxazosin (CARDURA) 4 MG tablet, Take 1 tablet by mouth Every Night., Disp: 90 tablet, Rfl: 0  •  doxepin (SINEquan) 50 MG capsule, Take 50 mg by mouth Every Night., Disp: , Rfl:   •  fluticasone (FLONASE) 50 MCG/ACT nasal spray, 2 sprays into the nostril(s) as directed by provider Daily., Disp: 60 mL, Rfl: 2  •  Lansoprazole (PREVACID PO), lansoprazole, Disp: , Rfl:   •  metoprolol tartrate (LOPRESSOR) 25 MG tablet, Take 25 mg by mouth 2 (Two) Times a Day., Disp: , Rfl:   •  rivaroxaban (XARELTO) 20 MG tablet, Take 20 mg by mouth Daily., Disp: , Rfl:   •  tolterodine LA (DETROL LA) 4 MG 24 hr capsule, Take 4 mg by mouth Daily., Disp: , Rfl:   •  vitamin B-12 (CYANOCOBALAMIN) 1000 MCG tablet, Take 500 mcg by mouth Daily., Disp: , Rfl:   •  vitamin C (ASCORBIC ACID) 500 MG tablet, Take 1,000 mg by mouth Daily., Disp: , Rfl:     Allergies: Meloxicam, Levofloxacin, and Percocet [oxycodone-acetaminophen]    Physical Exam  Vitals reviewed.   Constitutional:       Appearance: Normal appearance.   HENT:      Head: Normocephalic.      Right Ear: Tympanic membrane, ear canal and external ear normal.      Left Ear: Tympanic membrane, ear canal and external ear normal.      Nose: Nose normal.      Mouth/Throat:      Pharynx: Oropharynx is clear.   Eyes:      Pupils: Pupils are equal, round, and reactive to light.   Cardiovascular:      Rate and Rhythm:  Normal rate and regular rhythm.      Pulses: Normal pulses.   Pulmonary:      Effort: Pulmonary effort is normal.      Breath sounds: Normal breath sounds.   Abdominal:      General: Abdomen is flat. Bowel sounds are normal.      Palpations: Abdomen is soft.   Musculoskeletal:         General: Normal range of motion.   Skin:     General: Skin is warm and dry.   Neurological:      General: No focal deficit present.      Mental Status: He is alert and oriented to person, place, and time.          Result Review :                   Assessment and Plan    Diagnoses and all orders for this visit:    1. Other iron deficiency anemia (Primary)  Comments:  Get blood work and monitor to see if it is stable  Orders:  -     CBC & Differential; Future  -     Iron; Future  -     CBC & Differential  -     Iron    2. Primary hypertension  Comments:  Stable continue medications  Orders:  -     Lipid Panel; Future  -     Lipid Panel    3. Hyperglycemia  Comments:  Blood work and monitor  Orders:  -     Comprehensive Metabolic Panel; Future  -     Hemoglobin A1c; Future  -     Comprehensive Metabolic Panel  -     Hemoglobin A1c    4. Obstructive sleep apnea syndrome  Comments:  Vies to restart his CPAP machine and discussed how it could affect his atrial Fib and hypertension    5. B12 deficiency  Comments:  Replacing and monitor  Orders:  -     Vitamin B12; Future  -     Vitamin B12    6. Paroxysmal atrial fibrillation (HCC)  Comments:  Nikos has been seeing cardiology              Follow Up   Return in about 6 months (around 8/6/2023).  Patient was given instructions and counseling regarding his condition or for health maintenance advice. Please see specific information pulled into the AVS if appropriate.     Jack Platt MD

## 2023-02-07 LAB
ALBUMIN SERPL-MCNC: 4.3 G/DL (ref 3.7–4.7)
ALBUMIN/GLOB SERPL: 1.7 {RATIO} (ref 1.2–2.2)
ALP SERPL-CCNC: 71 IU/L (ref 44–121)
ALT SERPL-CCNC: 12 IU/L (ref 0–44)
AST SERPL-CCNC: 20 IU/L (ref 0–40)
BASOPHILS # BLD AUTO: 0 X10E3/UL (ref 0–0.2)
BASOPHILS NFR BLD AUTO: 1 %
BILIRUB SERPL-MCNC: 0.4 MG/DL (ref 0–1.2)
BUN SERPL-MCNC: 20 MG/DL (ref 8–27)
BUN/CREAT SERPL: 19 (ref 10–24)
CALCIUM SERPL-MCNC: 9 MG/DL (ref 8.6–10.2)
CHLORIDE SERPL-SCNC: 105 MMOL/L (ref 96–106)
CHOLEST SERPL-MCNC: 130 MG/DL (ref 100–199)
CO2 SERPL-SCNC: 25 MMOL/L (ref 20–29)
CREAT SERPL-MCNC: 1.04 MG/DL (ref 0.76–1.27)
EGFRCR SERPLBLD CKD-EPI 2021: 74 ML/MIN/1.73
EOSINOPHIL # BLD AUTO: 0.4 X10E3/UL (ref 0–0.4)
EOSINOPHIL NFR BLD AUTO: 7 %
ERYTHROCYTE [DISTWIDTH] IN BLOOD BY AUTOMATED COUNT: 12.3 % (ref 11.6–15.4)
GLOBULIN SER CALC-MCNC: 2.6 G/DL (ref 1.5–4.5)
GLUCOSE SERPL-MCNC: 90 MG/DL (ref 70–99)
HBA1C MFR BLD: 5.5 % (ref 4.8–5.6)
HCT VFR BLD AUTO: 41.3 % (ref 37.5–51)
HDLC SERPL-MCNC: 45 MG/DL
HGB BLD-MCNC: 13.8 G/DL (ref 13–17.7)
IMM GRANULOCYTES # BLD AUTO: 0 X10E3/UL (ref 0–0.1)
IMM GRANULOCYTES NFR BLD AUTO: 0 %
IRON SERPL-MCNC: 77 UG/DL (ref 38–169)
LDLC SERPL CALC-MCNC: 59 MG/DL (ref 0–99)
LYMPHOCYTES # BLD AUTO: 1.3 X10E3/UL (ref 0.7–3.1)
LYMPHOCYTES NFR BLD AUTO: 22 %
MCH RBC QN AUTO: 30.9 PG (ref 26.6–33)
MCHC RBC AUTO-ENTMCNC: 33.4 G/DL (ref 31.5–35.7)
MCV RBC AUTO: 92 FL (ref 79–97)
MONOCYTES # BLD AUTO: 0.5 X10E3/UL (ref 0.1–0.9)
MONOCYTES NFR BLD AUTO: 8 %
NEUTROPHILS # BLD AUTO: 3.5 X10E3/UL (ref 1.4–7)
NEUTROPHILS NFR BLD AUTO: 62 %
PLATELET # BLD AUTO: 202 X10E3/UL (ref 150–450)
POTASSIUM SERPL-SCNC: 4.2 MMOL/L (ref 3.5–5.2)
PROT SERPL-MCNC: 6.9 G/DL (ref 6–8.5)
RBC # BLD AUTO: 4.47 X10E6/UL (ref 4.14–5.8)
SODIUM SERPL-SCNC: 142 MMOL/L (ref 134–144)
TRIGL SERPL-MCNC: 153 MG/DL (ref 0–149)
VIT B12 SERPL-MCNC: >2000 PG/ML (ref 232–1245)
VLDLC SERPL CALC-MCNC: 26 MG/DL (ref 5–40)
WBC # BLD AUTO: 5.7 X10E3/UL (ref 3.4–10.8)

## 2023-04-04 RX ORDER — DOXAZOSIN MESYLATE 4 MG/1
TABLET ORAL
Qty: 90 TABLET | Refills: 3 | Status: SHIPPED | OUTPATIENT
Start: 2023-04-04

## 2023-04-13 RX ORDER — ATORVASTATIN CALCIUM 20 MG/1
TABLET, FILM COATED ORAL
Qty: 90 TABLET | Refills: 0 | Status: SHIPPED | OUTPATIENT
Start: 2023-04-13 | End: 2023-04-17 | Stop reason: SDUPTHER

## 2023-04-17 ENCOUNTER — TELEPHONE (OUTPATIENT)
Dept: FAMILY MEDICINE CLINIC | Facility: CLINIC | Age: 78
End: 2023-04-17
Payer: MEDICARE

## 2023-04-17 RX ORDER — ATORVASTATIN CALCIUM 20 MG/1
20 TABLET, FILM COATED ORAL NIGHTLY
Qty: 90 TABLET | Refills: 0 | Status: SHIPPED | OUTPATIENT
Start: 2023-04-17

## 2023-04-17 NOTE — TELEPHONE ENCOUNTER
PT SAYS THAT [facility] IS SAYING THAT LIPITOR HASN'T BEEN CALLED IN .I TOLD HIM IT WAS CALLED IN ON 4-13-23 HE WOULD LIKE YOU TO CALL BACK IN AGAIN PLEASE.

## 2023-06-12 ENCOUNTER — OFFICE VISIT (OUTPATIENT)
Dept: FAMILY MEDICINE CLINIC | Facility: CLINIC | Age: 78
End: 2023-06-12
Payer: MEDICARE

## 2023-06-12 VITALS
OXYGEN SATURATION: 95 % | WEIGHT: 234.9 LBS | SYSTOLIC BLOOD PRESSURE: 125 MMHG | HEIGHT: 67 IN | DIASTOLIC BLOOD PRESSURE: 72 MMHG | HEART RATE: 58 BPM | BODY MASS INDEX: 36.87 KG/M2

## 2023-06-12 DIAGNOSIS — M17.0 PRIMARY OSTEOARTHRITIS OF BOTH KNEES: Primary | ICD-10-CM

## 2023-06-12 RX ORDER — SACUBITRIL AND VALSARTAN 24; 26 MG/1; MG/1
TABLET, FILM COATED ORAL
COMMUNITY
Start: 2023-06-01

## 2023-06-12 RX ORDER — PANTOPRAZOLE SODIUM 40 MG/1
TABLET, DELAYED RELEASE ORAL
COMMUNITY
Start: 2023-05-13

## 2023-06-12 NOTE — PROGRESS NOTES
"Chief Complaint  Knee Pain (Both knees)    Subjective          Ángel Parikh presents to [facility]  History of Present Illness  He has significant degenerative arthritis of his knees leftwards and bother him a lot he had received 5 shots in his left knee at the arthritis treatment center in [place] he said he heard a large pop when he got down in his knees its been hurting him off and on since then specially when he gets down on his knees however he says when he walks it still not too bad presently he was told that he needed knee replacement about 2 years ago and he tried this he says he still having periodic pain when he gets down on his knees and states that otherwise he is doing relatively okay  Knee Pain       Objective   Vital Signs:   /72   Pulse 58   Ht 170.2 cm (67\")   Wt 107 kg (234 lb 14.4 oz)   SpO2 95%   BMI 36.79 kg/m²     Body mass index is 36.79 kg/m².    Review of Systems   Constitutional: Negative.    HENT:  Negative for congestion, dental problem, ear discharge, ear pain and sore throat.    Respiratory:  Negative for apnea, chest tightness and shortness of breath.    Gastrointestinal:  Negative for constipation and nausea.   Endocrine: Negative for polyuria.   Genitourinary:  Negative for difficulty urinating.   Musculoskeletal:  Positive for arthralgias. Negative for gait problem.   Skin:  Negative for rash.   Hematological:  Negative for adenopathy.     Past History:  Medical History: has a past medical history of Anemia (11/22/2005), Arthritis, Cancer, Colonic polyp, Elevated PSA, Encounter for screening colonoscopy, Essential hypertension, Gastroesophageal reflux disease without esophagitis, H/O valvular heart disease, Hearing loss, Heart murmur, Hemorrhoid, Hiatal hernia, High risk medication use, History of cellulitis, History of tobacco use, Hypercholesterolemia, Hyperglycemia, Hyperlipidemia, Morbid obesity, Osteoarthritis, Overactive bladder, " Primary osteoarthritis involving multiple joints, Prostate cancer, Psoriasis, Restless leg syndrome, Sleep apnea with use of continuous positive airway pressure (CPAP), Vitamin B12 deficiency, Vitamin D deficiency, Wears glasses, Wears hearing aid, and Wears partial dentures.   Surgical History: has a past surgical history that includes Colonoscopy (11/10/2015); cystoscopy and needle biopsy prostate; Prostatectomy (N/A, 10/03/2017); Cardiac catheterization (N/A, 01/08/2021); and Colonoscopy (11/22/2005).         Current Outpatient Medications:     amiodarone (PACERONE) 200 MG tablet, Take 1 tablet by mouth 2 (Two) Times a Day., Disp: 30 tablet, Rfl: 5    aspirin 81 MG EC tablet, Take 1 tablet by mouth Daily. Stopped for surgery, Disp: , Rfl:     atorvastatin (LIPITOR) 20 MG tablet, Take 1 tablet by mouth Every Night. for cholesterol., Disp: 90 tablet, Rfl: 0    doxazosin (CARDURA) 4 MG tablet, TAKE 1 TABLET EVERY NIGHT, Disp: 90 tablet, Rfl: 3    doxepin (SINEquan) 50 MG capsule, Take 1 capsule by mouth Every Night., Disp: , Rfl:     Entresto 24-26 MG tablet, , Disp: , Rfl:     fluticasone (FLONASE) 50 MCG/ACT nasal spray, 2 sprays into the nostril(s) as directed by provider Daily., Disp: 60 mL, Rfl: 2    Lansoprazole (PREVACID PO), lansoprazole, Disp: , Rfl:     metoprolol tartrate (LOPRESSOR) 25 MG tablet, Take 1 tablet by mouth 2 (Two) Times a Day., Disp: , Rfl:     pantoprazole (PROTONIX) 40 MG EC tablet, , Disp: , Rfl:     rivaroxaban (XARELTO) 20 MG tablet, Take 1 tablet by mouth Daily., Disp: , Rfl:     tolterodine LA (DETROL LA) 4 MG 24 hr capsule, Take 1 capsule by mouth Daily., Disp: , Rfl:     vitamin B-12 (CYANOCOBALAMIN) 1000 MCG tablet, Take 500 mcg by mouth Daily., Disp: , Rfl:     vitamin C (ASCORBIC ACID) 500 MG tablet, Take 2 tablets by mouth Daily., Disp: , Rfl:     Allergies: Meloxicam, Levofloxacin, and Percocet [oxycodone-acetaminophen]    Physical Exam  Vitals reviewed.   Constitutional:        Appearance: Normal appearance.   HENT:      Head: Normocephalic.      Right Ear: Tympanic membrane, ear canal and external ear normal.      Left Ear: Tympanic membrane, ear canal and external ear normal.      Nose: Nose normal.      Mouth/Throat:      Pharynx: Oropharynx is clear.   Eyes:      Pupils: Pupils are equal, round, and reactive to light.   Cardiovascular:      Rate and Rhythm: Normal rate and regular rhythm.      Pulses: Normal pulses.   Pulmonary:      Effort: Pulmonary effort is normal.      Breath sounds: Normal breath sounds.   Abdominal:      General: Abdomen is flat. Bowel sounds are normal.      Palpations: Abdomen is soft.   Musculoskeletal:         General: Normal range of motion.      Comments: Tenderness to both joint lines mostly the left 1 is lateral the right one is lateral as well but the left.  To be the worse   Skin:     General: Skin is warm and dry.   Neurological:      General: No focal deficit present.      Mental Status: He is alert and oriented to person, place, and time.        Result Review :                   Assessment and Plan    Diagnoses and all orders for this visit:    1. Primary osteoarthritis of both knees (Primary)  Comments:  patient appears to need a total knee replacement discussed treatment at this time he wants to hold off continue Tylenol ice and Voltaren gel              Follow Up   No follow-ups on file.  Patient was given instructions and counseling regarding his condition or for health maintenance advice. Please see specific information pulled into the AVS if appropriate.     Jack Platt MD

## 2023-08-07 ENCOUNTER — OFFICE VISIT (OUTPATIENT)
Dept: FAMILY MEDICINE CLINIC | Facility: CLINIC | Age: 78
End: 2023-08-07
Payer: MEDICARE

## 2023-08-07 VITALS
OXYGEN SATURATION: 97 % | SYSTOLIC BLOOD PRESSURE: 120 MMHG | BODY MASS INDEX: 37.59 KG/M2 | TEMPERATURE: 98 F | DIASTOLIC BLOOD PRESSURE: 80 MMHG | WEIGHT: 239.5 LBS | HEIGHT: 67 IN | HEART RATE: 59 BPM

## 2023-08-07 DIAGNOSIS — M17.0 PRIMARY OSTEOARTHRITIS OF BOTH KNEES: ICD-10-CM

## 2023-08-07 DIAGNOSIS — R73.9 HYPERGLYCEMIA: ICD-10-CM

## 2023-08-07 DIAGNOSIS — E78.2 MIXED HYPERLIPIDEMIA: ICD-10-CM

## 2023-08-07 DIAGNOSIS — E53.8 B12 DEFICIENCY: ICD-10-CM

## 2023-08-07 DIAGNOSIS — I48.0 PAROXYSMAL ATRIAL FIBRILLATION: ICD-10-CM

## 2023-08-07 DIAGNOSIS — I10 PRIMARY HYPERTENSION: Primary | ICD-10-CM

## 2023-08-07 PROCEDURE — 99214 OFFICE O/P EST MOD 30 MIN: CPT | Performed by: FAMILY MEDICINE

## 2023-08-07 PROCEDURE — 3074F SYST BP LT 130 MM HG: CPT | Performed by: FAMILY MEDICINE

## 2023-08-07 PROCEDURE — 3079F DIAST BP 80-89 MM HG: CPT | Performed by: FAMILY MEDICINE

## 2023-08-07 PROCEDURE — 36415 COLL VENOUS BLD VENIPUNCTURE: CPT | Performed by: FAMILY MEDICINE

## 2023-08-07 RX ORDER — ATORVASTATIN CALCIUM 20 MG/1
20 TABLET, FILM COATED ORAL NIGHTLY
Qty: 90 TABLET | Refills: 1 | Status: SHIPPED | OUTPATIENT
Start: 2023-08-07

## 2023-08-07 NOTE — PROGRESS NOTES
"Chief Complaint  Hyperlipidemia and Hypertension    Subjective          Ángel Parikh presents to [facility]  History of Present Illness  Patient comes in today to recheck on his medication says doing relatively well no other problems [place] he has seen a cardiologist recently they did do an echo and reported that his murmur had basically not changed much he says otherwise he needs his medications he says his sugars been doing well has been watching his diet he says otherwise he denies any other difficulties    Objective   Vital Signs:   /80 (BP Location: Left arm, Patient Position: Sitting, Cuff Size: Adult)   Pulse 59   Temp 98 øF (36.7 øC) (Temporal)   Ht 170.2 cm (67\")   Wt 109 kg (239 lb 8 oz)   SpO2 97%   BMI 37.51 kg/mý     Body mass index is 37.51 kg/mý.    Review of Systems   Constitutional: Negative.    HENT:  Negative for congestion, dental problem, ear discharge, ear pain and sore throat.    Respiratory:  Positive for shortness of breath. Negative for apnea and chest tightness.    Gastrointestinal:  Negative for constipation and nausea.   Endocrine: Negative for polyuria.   Genitourinary:  Negative for difficulty urinating.   Musculoskeletal:  Negative for arthralgias and gait problem.   Skin:  Negative for rash.   Hematological:  Negative for adenopathy.     Past History:  Medical History: has a past medical history of Anemia (11/22/2005), Arthritis, Cancer, Colonic polyp, Elevated PSA, Encounter for screening colonoscopy, Essential hypertension, Gastroesophageal reflux disease without esophagitis, H/O valvular heart disease, Hearing loss, Heart murmur, Hemorrhoid, Hiatal hernia, High risk medication use, History of cellulitis, History of tobacco use, Hypercholesterolemia, Hyperglycemia, Hyperlipidemia, Morbid obesity, Osteoarthritis, Overactive bladder, Primary osteoarthritis involving multiple joints, Prostate cancer, Psoriasis, Restless leg syndrome, Sleep " apnea with use of continuous positive airway pressure (CPAP), Vitamin B12 deficiency, Vitamin D deficiency, Wears glasses, Wears hearing aid, and Wears partial dentures.   Surgical History: has a past surgical history that includes Colonoscopy (11/10/2015); cystoscopy and needle biopsy prostate; Prostatectomy (N/A, 10/03/2017); Cardiac catheterization (N/A, 01/08/2021); and Colonoscopy (11/22/2005).         Current Outpatient Medications:     amiodarone (PACERONE) 200 MG tablet, Take 1 tablet by mouth 2 (Two) Times a Day., Disp: 30 tablet, Rfl: 5    aspirin 81 MG EC tablet, Take 1 tablet by mouth Daily. Stopped for surgery, Disp: , Rfl:     atorvastatin (LIPITOR) 20 MG tablet, Take 1 tablet by mouth Every Night. for cholesterol., Disp: 90 tablet, Rfl: 1    doxazosin (CARDURA) 4 MG tablet, TAKE 1 TABLET EVERY NIGHT, Disp: 90 tablet, Rfl: 3    doxepin (SINEquan) 50 MG capsule, Take 1 capsule by mouth Every Night., Disp: , Rfl:     Entresto 24-26 MG tablet, , Disp: , Rfl:     fluticasone (FLONASE) 50 MCG/ACT nasal spray, 2 sprays into the nostril(s) as directed by provider Daily., Disp: 60 mL, Rfl: 2    Lansoprazole (PREVACID PO), lansoprazole, Disp: , Rfl:     metoprolol tartrate (LOPRESSOR) 25 MG tablet, Take 1 tablet by mouth 2 (Two) Times a Day., Disp: , Rfl:     pantoprazole (PROTONIX) 40 MG EC tablet, , Disp: , Rfl:     rivaroxaban (XARELTO) 20 MG tablet, Take 1 tablet by mouth Daily., Disp: , Rfl:     tolterodine LA (DETROL LA) 4 MG 24 hr capsule, Take 1 capsule by mouth Daily., Disp: , Rfl:     vitamin B-12 (CYANOCOBALAMIN) 1000 MCG tablet, Take 0.5 tablets by mouth Daily., Disp: , Rfl:     vitamin C (ASCORBIC ACID) 500 MG tablet, Take 2 tablets by mouth Daily., Disp: , Rfl:     Allergies: Meloxicam, Levofloxacin, and Percocet [oxycodone-acetaminophen]    Physical Exam  Vitals reviewed.   Constitutional:       Appearance: Normal appearance.   HENT:      Head: Normocephalic.      Right Ear: Tympanic membrane,  ear canal and external ear normal.      Left Ear: Tympanic membrane, ear canal and external ear normal.      Nose: Nose normal.      Mouth/Throat:      Pharynx: Oropharynx is clear.   Eyes:      Pupils: Pupils are equal, round, and reactive to light.   Cardiovascular:      Rate and Rhythm: Normal rate and regular rhythm.      Pulses: Normal pulses.   Pulmonary:      Effort: Pulmonary effort is normal.      Breath sounds: Normal breath sounds.   Abdominal:      General: Abdomen is flat. Bowel sounds are normal.      Palpations: Abdomen is soft.   Musculoskeletal:         General: Normal range of motion.   Skin:     General: Skin is warm and dry.   Neurological:      General: No focal deficit present.      Mental Status: He is alert and oriented to person, place, and time.        Result Review :                   Assessment and Plan    Diagnoses and all orders for this visit:    1. Primary hypertension (Primary)  Comments:  Will continue medications and get blood work  Orders:  -     Comprehensive Metabolic Panel; Future    2. Paroxysmal atrial fibrillation  Comments:  Stable presently continues to see cardiology    3. B12 deficiency  Comments:  Placing him we will get B12  Orders:  -     Vitamin B12; Future    4. Hyperglycemia  Comments:  Blood work and monitor  Orders:  -     Hemoglobin A1c; Future    5. Primary osteoarthritis of both knees  Comments:  Stable seeing orthopedics    6. Mixed hyperlipidemia  Comments:  Blood work and monitor  Orders:  -     atorvastatin (LIPITOR) 20 MG tablet; Take 1 tablet by mouth Every Night. for cholesterol.  Dispense: 90 tablet; Refill: 1  -     Lipid Panel; Future              Follow Up   No follow-ups on file.  Patient was given instructions and counseling regarding his condition or for health maintenance advice. Please see specific information pulled into the AVS if appropriate.     Jack Platt MD

## 2023-08-08 LAB
ALBUMIN SERPL-MCNC: 4.4 G/DL (ref 3.8–4.8)
ALBUMIN/GLOB SERPL: 2 {RATIO} (ref 1.2–2.2)
ALP SERPL-CCNC: 66 IU/L (ref 44–121)
ALT SERPL-CCNC: 13 IU/L (ref 0–44)
AST SERPL-CCNC: 19 IU/L (ref 0–40)
BILIRUB SERPL-MCNC: 0.4 MG/DL (ref 0–1.2)
BUN SERPL-MCNC: 19 MG/DL (ref 8–27)
BUN/CREAT SERPL: 16 (ref 10–24)
CALCIUM SERPL-MCNC: 9.1 MG/DL (ref 8.6–10.2)
CHLORIDE SERPL-SCNC: 106 MMOL/L (ref 96–106)
CHOLEST SERPL-MCNC: 118 MG/DL (ref 100–199)
CO2 SERPL-SCNC: 25 MMOL/L (ref 20–29)
CREAT SERPL-MCNC: 1.16 MG/DL (ref 0.76–1.27)
EGFRCR SERPLBLD CKD-EPI 2021: 65 ML/MIN/1.73
GLOBULIN SER CALC-MCNC: 2.2 G/DL (ref 1.5–4.5)
GLUCOSE SERPL-MCNC: 87 MG/DL (ref 70–99)
HBA1C MFR BLD: 5.8 % (ref 4.8–5.6)
HDLC SERPL-MCNC: 44 MG/DL
LDLC SERPL CALC-MCNC: 52 MG/DL (ref 0–99)
POTASSIUM SERPL-SCNC: 4.7 MMOL/L (ref 3.5–5.2)
PROT SERPL-MCNC: 6.6 G/DL (ref 6–8.5)
SODIUM SERPL-SCNC: 144 MMOL/L (ref 134–144)
TRIGL SERPL-MCNC: 122 MG/DL (ref 0–149)
VIT B12 SERPL-MCNC: 664 PG/ML (ref 232–1245)
VLDLC SERPL CALC-MCNC: 22 MG/DL (ref 5–40)

## 2023-08-16 DIAGNOSIS — E78.2 MIXED HYPERLIPIDEMIA: ICD-10-CM

## 2023-08-21 NOTE — TELEPHONE ENCOUNTER
Caller: [facility] - [place], [place] - [address] - [phone] [facility] [phone] FX    Relationship:     Best call back number: [phone]    Requested Prescriptions:   Requested Prescriptions     Pending Prescriptions Disp Refills    atorvastatin (LIPITOR) 20 MG tablet [Pharmacy Med Name: ATORVASTATIN TABS 20MG] 30 tablet 11     Sig: TAKE 1 TABLET AT BEDTIME FOR CHOLESTEROL    pantoprazole (PROTONIX) 40 MG EC tablet          Pharmacy where request should be sent: [facility] - [place], [place] - [address] - [phone] [facility] [phone] FX  [id number] [place], [place] [facility] [address] - [phone] [facility] [phone] FX     Last office visit with prescribing clinician: Visit date not found   Last telemedicine visit with prescribing clinician: Visit date not found   Next office visit with prescribing clinician: Visit date not found     Additional details provided by patient: COMPLETELY OUT OF MEDICATION AND WOULD NEED EMERGENCY SENT TO [facility] UNTIL 90 DAY SUPPLY OF MEDICATION COMES IN MAIL FROM [facility]      Does the patient have less than a 3 day supply:  [x] Yes  [] No    Would you like a call back once the refill request has been completed: [x] Yes [] No    If the office needs to give you a call back, can they leave a voicemail: [x] Yes [] No    Dominga Chin   08/21/23 09:37 EDT

## 2023-08-23 RX ORDER — ATORVASTATIN CALCIUM 20 MG/1
TABLET, FILM COATED ORAL
Qty: 30 TABLET | Refills: 11 | Status: SHIPPED | OUTPATIENT
Start: 2023-08-23

## 2023-08-23 RX ORDER — PANTOPRAZOLE SODIUM 40 MG/1
40 TABLET, DELAYED RELEASE ORAL DAILY
Qty: 30 TABLET | Refills: 0 | Status: SHIPPED | OUTPATIENT
Start: 2023-08-23 | End: 2023-09-22

## 2023-09-07 ENCOUNTER — TELEPHONE (OUTPATIENT)
Dept: FAMILY MEDICINE CLINIC | Facility: CLINIC | Age: 78
End: 2023-09-07
Payer: MEDICARE

## 2023-09-07 NOTE — TELEPHONE ENCOUNTER
Caller: Ángel Parikh    Relationship: Self    Best call back number: [phone]     What is the best time to reach you: ANY     Who are you requesting to speak with (clinical staff, provider,  specific staff member): CLINICAL     What was the call regarding: WANTS TO SEE IF WE HAVE ANY SAMPLES OF ENTRESTO DUE TO HIM BEING OUT AND HAVING TO WAIT ON MAIL IN.  CALL TO LET HIM KNOW EITHER WAY     Is it okay if the provider responds through goBaltohart: NO

## 2023-09-08 RX ORDER — SACUBITRIL AND VALSARTAN 24; 26 MG/1; MG/1
1 TABLET, FILM COATED ORAL 2 TIMES DAILY
Qty: 60 TABLET | Refills: 5 | Status: SHIPPED | OUTPATIENT
Start: 2023-09-08

## 2023-09-08 RX ORDER — SACUBITRIL AND VALSARTAN 24; 26 MG/1; MG/1
1 TABLET, FILM COATED ORAL 2 TIMES DAILY
Qty: 60 TABLET | Refills: 5 | Status: SHIPPED | OUTPATIENT
Start: 2023-09-08 | End: 2023-09-08 | Stop reason: SDUPTHER

## 2023-09-08 NOTE — TELEPHONE ENCOUNTER
Patient called asking for samples of ENTRESTO , I advised patient we do not have samples and he asked for a prescription request states he has been out for 4 days

## 2023-10-07 ENCOUNTER — TRANSITIONAL CARE MANAGEMENT TELEPHONE ENCOUNTER (OUTPATIENT)
Dept: CALL CENTER | Facility: HOSPITAL | Age: 78
End: 2023-10-07
Payer: MEDICARE

## 2023-10-07 ENCOUNTER — READMISSION MANAGEMENT (OUTPATIENT)
Dept: CALL CENTER | Facility: HOSPITAL | Age: 78
End: 2023-10-07
Payer: MEDICARE

## 2023-10-07 NOTE — OUTREACH NOTE
Prep Survey      Flowsheet Row Responses   [facility] facility patient discharged from? Non-[facility]   Is LACE score < 7 ? Non-BH Discharge   Eligibility [facility]   Date of Discharge 10/06/23   Discharge diagnosis CHEST PAIN   Does the patient have one of the following disease processes/diagnoses(primary or secondary)? Other   Prep survey completed? Yes            Margi Hernandez Registered Nurse

## 2023-10-07 NOTE — OUTREACH NOTE
Call Center TCM Note      Flowsheet Row Responses   [facility] patient discharged from? Non-  [[facility]]   Does the patient have one of the following disease processes/diagnoses(primary or secondary)? Other   TCM attempt successful? Yes   Call start time 1358   Discharge diagnosis CHEST PAIN   Person spoke with today (if not patient) and relationship patient   Meds reviewed with patient/caregiver? Yes   Is the patient having any side effects they believe may be caused by any medication additions or changes? No   Does the patient have all medications ordered at discharge? --  [Pt states he was not prescribed anyting new]   Is the patient taking all medications as directed (includes completed medication regime)? Yes   Comments Pt does have f/u for 10/18/23 for hosp f/u, but is requesting a sooner appt.  none availabe at time of call. He was instructed to call PCP office monday to inquire about earlier appt.   Does the patient have an appointment with their PCP within 7-14 days of discharge? Other   Nursing Interventions Routed TCM call to PCP office   Psychosocial issues? No   Did the patient receive a copy of their discharge instructions? Yes   What is the patient's perception of their health status since discharge? Same   Is the patient/caregiver able to teach back the hierarchy of who to call/visit for symptoms/problems? PCP, Specialist, Home health nurse, Urgent Care, ED, 911 Yes   Additional teach back comments Pt continues to have afib.   TCM call completed? Yes   Wrap up additional comments Pt ok at this time. He is aware afib if pressent, but is able to perform ADLs.  He does have discharge paperwork from facily of discharge.   Would this patient benefit from a Referral to [facility] Social Work? No   Is the patient interested in additional calls from an ambulatory ? No            Tamra Vaughn RN    10/7/2023, 14:04 EDT

## 2023-10-18 ENCOUNTER — OFFICE VISIT (OUTPATIENT)
Dept: FAMILY MEDICINE CLINIC | Facility: CLINIC | Age: 78
End: 2023-10-18
Payer: MEDICARE

## 2023-10-18 VITALS
BODY MASS INDEX: 36.7 KG/M2 | DIASTOLIC BLOOD PRESSURE: 90 MMHG | OXYGEN SATURATION: 95 % | SYSTOLIC BLOOD PRESSURE: 132 MMHG | HEART RATE: 74 BPM | HEIGHT: 67 IN | WEIGHT: 233.8 LBS

## 2023-10-18 DIAGNOSIS — D50.9 IRON DEFICIENCY ANEMIA, UNSPECIFIED IRON DEFICIENCY ANEMIA TYPE: Primary | ICD-10-CM

## 2023-10-18 DIAGNOSIS — I48.0 PAROXYSMAL ATRIAL FIBRILLATION: ICD-10-CM

## 2023-10-18 DIAGNOSIS — I10 PRIMARY HYPERTENSION: ICD-10-CM

## 2023-10-18 DIAGNOSIS — R06.02 SHORTNESS OF BREATH: ICD-10-CM

## 2023-10-18 RX ORDER — FERROUS SULFATE 325(65) MG
1 TABLET ORAL EVERY 12 HOURS SCHEDULED
Qty: 60 TABLET | Refills: 3 | Status: SHIPPED | OUTPATIENT
Start: 2023-10-18

## 2023-10-18 RX ORDER — FERROUS SULFATE 325(65) MG
1 TABLET ORAL EVERY 12 HOURS SCHEDULED
COMMUNITY
Start: 2023-10-06 | End: 2023-10-18 | Stop reason: SDUPTHER

## 2023-10-18 RX ORDER — DOCUSATE SODIUM 100 MG/1
CAPSULE, LIQUID FILLED ORAL
COMMUNITY
Start: 2023-10-06 | End: 2023-10-23

## 2023-10-18 NOTE — PROGRESS NOTES
Transitional Care Follow Up Visit  Subjective     Ángel Parikh is a 78 y.o. male who presents for a transitional care management visit.    Within 48 business hours after discharge our office contacted him via telephone to coordinate his care and needs.      I reviewed and discussed the details of that call along with the discharge summary, hospital problems, inpatient lab results, inpatient diagnostic studies, and consultation reports with Ángel.     Current outpatient and discharge medications have been reconciled for the patient.  Reviewed by: Jack Platt MD          10/7/2023     7:49 AM   Date of TCM Phone Call   [facility]   Date of Discharge 10/6/2023     Risk for Readmission (LACE) No data recorded    History of Present Illness  Patient comes in today for follow-up of recent hospitalization where he is having worsening atrial fibs shortness of breath chest pain he was noted to have worsening iron deficiency anemia he has had this previously was worked up at that time had gastritis and things that he has had iron replaced eventually fixed itself he had not been taking iron he recently was noted to have iron deficient anemia has now started on iron has reports that he does have follow-up with cardiology next week with a monitor to be placed     Course During Hospital Stay:  moderate complexity     The following portions of the patient's history were reviewed and updated as appropriate: allergies, current medications, past family history, past medical history, past social history, past surgical history, and problem list.    Review of Systems   Constitutional:  Positive for fatigue.   HENT: Negative.     Eyes: Negative.    Respiratory:  Positive for shortness of breath.    Cardiovascular:  Positive for palpitations.   Endocrine: Negative.    Genitourinary: Negative.    Musculoskeletal: Negative.    Skin: Negative.    Allergic/Immunologic: Negative.    Neurological: Negative.     Hematological: Negative.    Psychiatric/Behavioral: Negative.         Objective   Physical Exam  Vitals reviewed.   Constitutional:       Appearance: Normal appearance.   HENT:      Head: Normocephalic.      Right Ear: Tympanic membrane, ear canal and external ear normal.      Left Ear: Tympanic membrane, ear canal and external ear normal.      Nose: Nose normal.      Mouth/Throat:      Pharynx: Oropharynx is clear.   Eyes:      Pupils: Pupils are equal, round, and reactive to light.   Cardiovascular:      Rate and Rhythm: Normal rate. Rhythm irregularly irregular.      Pulses: Normal pulses.   Pulmonary:      Effort: Pulmonary effort is normal.      Breath sounds: Normal breath sounds.   Abdominal:      General: Abdomen is flat. Bowel sounds are normal.      Palpations: Abdomen is soft.   Musculoskeletal:         General: Normal range of motion.   Skin:     General: Skin is warm and dry.   Neurological:      General: No focal deficit present.      Mental Status: He is alert and oriented to person, place, and time.         Assessment & Plan   Problems Addressed this Visit          Cardiac and Vasculature    HTN (hypertension)       Hematology and Neoplasia    Iron deficiency anemia - Primary    Relevant Medications    FeroSul 325 (65 Fe) MG tablet       Pulmonary and Pneumonias    Shortness of breath     Other Visit Diagnoses       Paroxysmal atrial fibrillation        He is on blood thinner is going next week for monitor for 7 days and follow-up with cardiology          Diagnoses         Codes Comments    Iron deficiency anemia, unspecified iron deficiency anemia type    -  Primary ICD-10-CM: D50.9  ICD-9-CM: 280.9 Colonoscopy 15 months ago patient noticed no bleeding he had not been taking iron we will replace with iron recheck in 1 month    Paroxysmal atrial fibrillation     ICD-10-CM: I48.0  ICD-9-CM: 427.31 He is on blood thinner is going next week for monitor for 7 days and follow-up with cardiology     Primary hypertension     ICD-10-CM: I10  ICD-9-CM: 401.9 Stable monitor continue medications    Shortness of breath     ICD-10-CM: R06.02  ICD-9-CM: 786.05 Multifactorial continue treatment recheck in 1 month

## 2023-10-23 ENCOUNTER — OFFICE VISIT (OUTPATIENT)
Dept: FAMILY MEDICINE CLINIC | Facility: CLINIC | Age: 78
End: 2023-10-23
Payer: MEDICARE

## 2023-10-23 VITALS
HEIGHT: 67 IN | HEART RATE: 107 BPM | OXYGEN SATURATION: 96 % | SYSTOLIC BLOOD PRESSURE: 124 MMHG | WEIGHT: 225.2 LBS | DIASTOLIC BLOOD PRESSURE: 84 MMHG | BODY MASS INDEX: 35.35 KG/M2

## 2023-10-23 DIAGNOSIS — J20.9 ACUTE BRONCHITIS, UNSPECIFIED ORGANISM: Primary | ICD-10-CM

## 2023-10-23 PROBLEM — K21.9 GASTROESOPHAGEAL REFLUX DISEASE WITHOUT ESOPHAGITIS: Status: ACTIVE | Noted: 2023-10-23

## 2023-10-23 PROBLEM — I48.0 PAROXYSMAL ATRIAL FIBRILLATION: Status: ACTIVE | Noted: 2023-10-05

## 2023-10-23 PROBLEM — Z85.46 HISTORY OF MALIGNANT NEOPLASM OF PROSTATE: Status: ACTIVE | Noted: 2019-02-07

## 2023-10-23 LAB
EXPIRATION DATE: NORMAL
FLUAV AG UPPER RESP QL IA.RAPID: NOT DETECTED
FLUBV AG UPPER RESP QL IA.RAPID: NOT DETECTED
INTERNAL CONTROL: NORMAL
Lab: NORMAL
SARS-COV-2 AG UPPER RESP QL IA.RAPID: NOT DETECTED

## 2023-10-23 PROCEDURE — 3074F SYST BP LT 130 MM HG: CPT | Performed by: PHYSICIAN ASSISTANT

## 2023-10-23 PROCEDURE — 3079F DIAST BP 80-89 MM HG: CPT | Performed by: PHYSICIAN ASSISTANT

## 2023-10-23 PROCEDURE — 87428 SARSCOV & INF VIR A&B AG IA: CPT | Performed by: PHYSICIAN ASSISTANT

## 2023-10-23 PROCEDURE — 99213 OFFICE O/P EST LOW 20 MIN: CPT | Performed by: PHYSICIAN ASSISTANT

## 2023-10-23 PROCEDURE — 1159F MED LIST DOCD IN RCRD: CPT | Performed by: PHYSICIAN ASSISTANT

## 2023-10-23 PROCEDURE — 1160F RVW MEDS BY RX/DR IN RCRD: CPT | Performed by: PHYSICIAN ASSISTANT

## 2023-10-23 RX ORDER — VIBEGRON 75 MG/1
1 TABLET, FILM COATED ORAL DAILY
COMMUNITY

## 2023-10-23 RX ORDER — DOXYCYCLINE 100 MG/1
100 CAPSULE ORAL 2 TIMES DAILY
Qty: 20 CAPSULE | Refills: 0 | Status: SHIPPED | OUTPATIENT
Start: 2023-10-23

## 2023-10-23 RX ORDER — DOXEPIN HYDROCHLORIDE 50 MG/1
50 CAPSULE ORAL NIGHTLY
COMMUNITY

## 2023-10-23 RX ORDER — PREDNISONE 20 MG/1
TABLET ORAL
Qty: 20 TABLET | Refills: 0 | Status: SHIPPED | OUTPATIENT
Start: 2023-10-23 | End: 2023-11-03

## 2023-10-23 RX ORDER — DEXTROMETHORPHAN HYDROBROMIDE AND PROMETHAZINE HYDROCHLORIDE 15; 6.25 MG/5ML; MG/5ML
5 SYRUP ORAL 4 TIMES DAILY PRN
Qty: 180 ML | Refills: 0 | Status: SHIPPED | OUTPATIENT
Start: 2023-10-23

## 2023-10-23 NOTE — PROGRESS NOTES
"Chief Complaint  Cough (X1 wk.  Pt has been vaccinated) and Earache (Right ear x2 days)    Subjective          Ángel Parikh presents to [facility]  History of Present Illness  Patient is here today for cough, congestion, earache and mild wheezing.  He took a covid test at home which was negative.  He has not had fever or chills, but he's getting worse instead of better and has run out of promethazine DM which is the only thing that helps the cough so he can sleep.     Objective   Vital Signs:   /84 (BP Location: Right arm)   Pulse 107   Ht 170.2 cm (67\")   Wt 102 kg (225 lb 3.2 oz)   SpO2 96%   BMI 35.27 kg/m²     Body mass index is 35.27 kg/m².    Review of Systems   Constitutional:  Negative for chills, fatigue and fever.   HENT:  Positive for congestion and ear pain. Negative for postnasal drip and sinus pressure.    Eyes:  Negative for blurred vision.   Respiratory:  Positive for cough, chest tightness and wheezing. Negative for shortness of breath.    Cardiovascular:  Negative for chest pain, palpitations and leg swelling.   Gastrointestinal:  Negative for abdominal pain, constipation, diarrhea, nausea and vomiting.   Neurological:  Negative for dizziness, tremors and headache.   Psychiatric/Behavioral:  Negative for depressed mood. The patient is not nervous/anxious.        Past History:  Medical History: has a past medical history of Anemia (11/22/2005), Arthritis, Cancer, Colonic polyp, Elevated PSA, Encounter for screening colonoscopy, Essential hypertension, Gastroesophageal reflux disease without esophagitis, H/O valvular heart disease, Hearing loss, Heart murmur, Hemorrhoid, Hiatal hernia, High risk medication use, History of cellulitis, History of tobacco use, Hypercholesterolemia, Hyperglycemia, Hyperlipidemia, Morbid obesity, Osteoarthritis, Overactive bladder, Primary osteoarthritis involving multiple joints, Prostate cancer, Psoriasis, Restless leg " syndrome, Sleep apnea with use of continuous positive airway pressure (CPAP), Vitamin B12 deficiency, Vitamin D deficiency, Wears glasses, Wears hearing aid, and Wears partial dentures.   Surgical History: has a past surgical history that includes Colonoscopy (11/10/2015); cystoscopy and needle biopsy prostate; Prostatectomy (N/A, 10/03/2017); Cardiac catheterization (N/A, 01/08/2021); and Colonoscopy (11/22/2005).   Family History: family history includes Cirrhosis in his brother; Heart attack in his father; Hypertension in his father and mother; Stroke in his mother.   Social History: reports that he quit smoking about 33 years ago. His smoking use included pipe and cigars. He has never used smokeless tobacco. He reports that he does not currently use alcohol. He reports that he does not use drugs.      Current Outpatient Medications:     aspirin 81 MG EC tablet, Take 1 tablet by mouth Daily. Stopped for surgery, Disp: , Rfl:     atorvastatin (LIPITOR) 20 MG tablet, TAKE 1 TABLET AT BEDTIME FOR CHOLESTEROL, Disp: 30 tablet, Rfl: 11    doxazosin (CARDURA) 4 MG tablet, TAKE 1 TABLET EVERY NIGHT, Disp: 90 tablet, Rfl: 3    Entresto 24-26 MG tablet, Take 1 tablet by mouth 2 (Two) Times a Day., Disp: 60 tablet, Rfl: 5    FeroSul 325 (65 Fe) MG tablet, Take 1 tablet by mouth Every 12 (Twelve) Hours., Disp: 60 tablet, Rfl: 3    fluticasone (FLONASE) 50 MCG/ACT nasal spray, 2 sprays into the nostril(s) as directed by provider Daily., Disp: 60 mL, Rfl: 2    metoprolol tartrate (LOPRESSOR) 25 MG tablet, Take 2 tablets by mouth 2 (Two) Times a Day., Disp: , Rfl:     rivaroxaban (XARELTO) 20 MG tablet, Take 1 tablet by mouth Daily., Disp: , Rfl:     vitamin B-12 (CYANOCOBALAMIN) 1000 MCG tablet, Take 0.5 tablets by mouth Daily., Disp: , Rfl:     vitamin C (ASCORBIC ACID) 500 MG tablet, Take 2 tablets by mouth Daily., Disp: , Rfl:     doxepin (SINEquan) 50 MG capsule, Take 1 capsule by mouth Every Night., Disp: , Rfl:      doxycycline (MONODOX) 100 MG capsule, Take 1 capsule by mouth 2 (Two) Times a Day., Disp: 20 capsule, Rfl: 0    predniSONE (DELTASONE) 20 MG tablet, Take 3 tablets by mouth Daily for 3 days, THEN 2 tablets Daily for 3 days, THEN 1 tablet Daily for 5 days., Disp: 20 tablet, Rfl: 0    promethazine-dextromethorphan (PROMETHAZINE-DM) 6.25-15 MG/5ML syrup, Take 5 mL by mouth 4 (Four) Times a Day As Needed for Cough., Disp: 180 mL, Rfl: 0    Vibegron (Gemtesa) 75 MG tablet, Take 1 tablet by mouth Daily., Disp: , Rfl:     Allergies: Meloxicam, Levofloxacin, and Percocet [oxycodone-acetaminophen]    Physical Exam  Constitutional:       Appearance: Normal appearance.   HENT:      Right Ear: Tympanic membrane, ear canal and external ear normal.      Left Ear: Tympanic membrane, ear canal and external ear normal.      Nose:      Right Sinus: Maxillary sinus tenderness present.      Left Sinus: Maxillary sinus tenderness present.   Cardiovascular:      Rate and Rhythm: Normal rate and regular rhythm.      Heart sounds: Normal heart sounds. No murmur heard.  Pulmonary:      Effort: No respiratory distress.      Breath sounds: Wheezing and rhonchi present.   Neurological:      Mental Status: He is alert and oriented to person, place, and time.   Psychiatric:         Behavior: Behavior normal.          Result Review :                   Assessment and Plan    Diagnoses and all orders for this visit:    1. Acute bronchitis, unspecified organism (Primary)  Assessment & Plan:  RX for Doxycycline, Promethazine DM and Prednisone taper, call or return if symptoms persist or worsen.     Orders:  -     POCT SARS-CoV-2 Antigen SLAVA + Flu    Other orders  -     promethazine-dextromethorphan (PROMETHAZINE-DM) 6.25-15 MG/5ML syrup; Take 5 mL by mouth 4 (Four) Times a Day As Needed for Cough.  Dispense: 180 mL; Refill: 0  -     predniSONE (DELTASONE) 20 MG tablet; Take 3 tablets by mouth Daily for 3 days, THEN 2 tablets Daily for 3 days, THEN 1  tablet Daily for 5 days.  Dispense: 20 tablet; Refill: 0  -     doxycycline (MONODOX) 100 MG capsule; Take 1 capsule by mouth 2 (Two) Times a Day.  Dispense: 20 capsule; Refill: 0        Follow Up   No follow-ups on file.  Patient was given instructions and counseling regarding his condition or for health maintenance advice. Please see specific information pulled into the AVS if appropriate.     Candy Patterson PA-C

## 2023-10-24 NOTE — ASSESSMENT & PLAN NOTE
RX for Doxycycline, Promethazine DM and Prednisone taper, call or return if symptoms persist or worsen.

## 2023-12-04 ENCOUNTER — TELEPHONE (OUTPATIENT)
Dept: FAMILY MEDICINE CLINIC | Facility: CLINIC | Age: 78
End: 2023-12-04

## 2023-12-04 NOTE — TELEPHONE ENCOUNTER
Caller: Ángel Parikh    Relationship: Self    Best call back number: [phone]    What form or medical record are you requesting: MOST RECENT LABS AND OFFICE NOTES    Who is requesting this form or medical record from you: DR. VELASCO    How would you like to receive the form or medical records (pick-up, mail, fax): FAX    If fax, what is the fax number: N/A    Timeframe paperwork needed: ASAP

## 2023-12-05 ENCOUNTER — HOSPITAL ENCOUNTER (OUTPATIENT)
Dept: CARDIOLOGY | Facility: HOSPITAL | Age: 78
Discharge: HOME | End: 2023-12-05
Attending: INTERNAL MEDICINE | Admitting: INTERNAL MEDICINE
Payer: MEDICARE

## 2023-12-05 VITALS
RESPIRATION RATE: 16 BRPM | DIASTOLIC BLOOD PRESSURE: 60 MMHG | WEIGHT: 223.6 LBS | BODY MASS INDEX: 35.09 KG/M2 | OXYGEN SATURATION: 94 % | HEIGHT: 67 IN | HEART RATE: 50 BPM | SYSTOLIC BLOOD PRESSURE: 94 MMHG | TEMPERATURE: 97.4 F

## 2023-12-05 DIAGNOSIS — I48.11 LONGSTANDING PERSISTENT ATRIAL FIBRILLATION: ICD-10-CM

## 2023-12-05 LAB
ANION GAP SERPL CALCULATED.3IONS-SCNC: 9 MMOL/L (ref 5–15)
BUN BLDA-MCNC: 14 MG/DL (ref 8–26)
BUN SERPL-MCNC: 14 MG/DL (ref 8–23)
BUN/CREAT SERPL: 15.9 (ref 7–25)
CA-I BLDA-SCNC: 1.24 MMOL/L (ref 1.2–1.32)
CALCIUM SPEC-SCNC: 9.1 MG/DL (ref 8.6–10.5)
CHLORIDE BLDA-SCNC: 104 MMOL/L (ref 98–109)
CHLORIDE SERPL-SCNC: 106 MMOL/L (ref 98–107)
CO2 BLDA-SCNC: 27 MMOL/L (ref 24–29)
CO2 SERPL-SCNC: 27 MMOL/L (ref 22–29)
CREAT BLDA-MCNC: 0.9 MG/DL (ref 0.6–1.3)
CREAT SERPL-MCNC: 0.88 MG/DL (ref 0.76–1.27)
DEPRECATED RDW RBC AUTO: 53.9 FL (ref 37–54)
EGFRCR SERPLBLD CKD-EPI 2021: 88 ML/MIN/1.73
EGFRCR SERPLBLD CKD-EPI 2021: 88.6 ML/MIN/1.73
ERYTHROCYTE [DISTWIDTH] IN BLOOD BY AUTOMATED COUNT: 16.2 % (ref 12.3–15.4)
GLUCOSE BLDC GLUCOMTR-MCNC: 114 MG/DL (ref 70–130)
GLUCOSE SERPL-MCNC: 139 MG/DL (ref 65–99)
HCT VFR BLD AUTO: 39.5 % (ref 37.5–51)
HCT VFR BLDA CALC: 37 % (ref 38–51)
HGB BLD-MCNC: 12.8 G/DL (ref 13–17.7)
HGB BLDA-MCNC: 12.6 G/DL (ref 12–17)
MCH RBC QN AUTO: 29.4 PG (ref 26.6–33)
MCHC RBC AUTO-ENTMCNC: 32.4 G/DL (ref 31.5–35.7)
MCV RBC AUTO: 90.6 FL (ref 79–97)
PLATELET # BLD AUTO: 182 10*3/MM3 (ref 140–450)
PMV BLD AUTO: 9.9 FL (ref 6–12)
POTASSIUM BLDA-SCNC: 3.9 MMOL/L (ref 3.5–4.9)
POTASSIUM SERPL-SCNC: 4 MMOL/L (ref 3.5–5.2)
RBC # BLD AUTO: 4.36 10*6/MM3 (ref 4.14–5.8)
SODIUM BLD-SCNC: 142 MMOL/L (ref 138–146)
SODIUM SERPL-SCNC: 142 MMOL/L (ref 136–145)
WBC NRBC COR # BLD AUTO: 5.48 10*3/MM3 (ref 3.4–10.8)

## 2023-12-05 PROCEDURE — 85014 HEMATOCRIT: CPT

## 2023-12-05 PROCEDURE — 25010000002 FENTANYL CITRATE (PF) 50 MCG/ML SOLUTION: Performed by: INTERNAL MEDICINE

## 2023-12-05 PROCEDURE — 36415 COLL VENOUS BLD VENIPUNCTURE: CPT

## 2023-12-05 PROCEDURE — 85027 COMPLETE CBC AUTOMATED: CPT | Performed by: INTERNAL MEDICINE

## 2023-12-05 PROCEDURE — 80048 BASIC METABOLIC PNL TOTAL CA: CPT | Performed by: INTERNAL MEDICINE

## 2023-12-05 PROCEDURE — 92960 CARDIOVERSION ELECTRIC EXT: CPT

## 2023-12-05 PROCEDURE — 25010000002 MIDAZOLAM PER 1 MG: Performed by: INTERNAL MEDICINE

## 2023-12-05 PROCEDURE — 93005 ELECTROCARDIOGRAM TRACING: CPT | Performed by: INTERNAL MEDICINE

## 2023-12-05 PROCEDURE — 80047 BASIC METABLC PNL IONIZED CA: CPT

## 2023-12-05 RX ORDER — FLECAINIDE ACETATE 50 MG/1
50 TABLET ORAL EVERY 12 HOURS
COMMUNITY

## 2023-12-05 RX ORDER — FENTANYL CITRATE 50 UG/ML
50-100 INJECTION, SOLUTION INTRAMUSCULAR; INTRAVENOUS ONCE AS NEEDED
Status: DISCONTINUED | OUTPATIENT
Start: 2023-12-05 | End: 2023-12-05 | Stop reason: HOSPADM

## 2023-12-05 RX ORDER — MULTIPLE VITAMINS W/ MINERALS TAB 9MG-400MCG
1 TAB ORAL DAILY
COMMUNITY

## 2023-12-05 RX ORDER — MIDAZOLAM HYDROCHLORIDE 1 MG/ML
2-8 INJECTION INTRAMUSCULAR; INTRAVENOUS ONCE AS NEEDED
Status: DISCONTINUED | OUTPATIENT
Start: 2023-12-05 | End: 2023-12-05 | Stop reason: HOSPADM

## 2023-12-05 RX ORDER — FLUMAZENIL 0.1 MG/ML
0.5 INJECTION INTRAVENOUS ONCE AS NEEDED
Status: DISCONTINUED | OUTPATIENT
Start: 2023-12-05 | End: 2023-12-05 | Stop reason: HOSPADM

## 2023-12-05 RX ORDER — FENTANYL CITRATE 50 UG/ML
INJECTION, SOLUTION INTRAMUSCULAR; INTRAVENOUS
Status: COMPLETED | OUTPATIENT
Start: 2023-12-05 | End: 2023-12-05

## 2023-12-05 RX ORDER — MIDAZOLAM HYDROCHLORIDE 1 MG/ML
INJECTION INTRAMUSCULAR; INTRAVENOUS
Status: COMPLETED | OUTPATIENT
Start: 2023-12-05 | End: 2023-12-05

## 2023-12-05 RX ORDER — PANTOPRAZOLE SODIUM 40 MG/1
40 TABLET, DELAYED RELEASE ORAL DAILY
COMMUNITY

## 2023-12-05 RX ORDER — NALOXONE HCL 0.4 MG/ML
0.4 VIAL (ML) INJECTION ONCE AS NEEDED
Status: DISCONTINUED | OUTPATIENT
Start: 2023-12-05 | End: 2023-12-05 | Stop reason: HOSPADM

## 2023-12-05 RX ADMIN — FENTANYL CITRATE 50 MCG: 50 INJECTION, SOLUTION INTRAMUSCULAR; INTRAVENOUS at 13:02

## 2023-12-05 RX ADMIN — MIDAZOLAM HYDROCHLORIDE 2 MG: 1 INJECTION, SOLUTION INTRAMUSCULAR; INTRAVENOUS at 13:02

## 2023-12-05 RX ADMIN — FENTANYL CITRATE 50 MCG: 50 INJECTION, SOLUTION INTRAMUSCULAR; INTRAVENOUS at 13:00

## 2023-12-05 RX ADMIN — MIDAZOLAM HYDROCHLORIDE 2 MG: 1 INJECTION, SOLUTION INTRAMUSCULAR; INTRAVENOUS at 13:04

## 2023-12-05 RX ADMIN — MIDAZOLAM HYDROCHLORIDE 2 MG: 1 INJECTION, SOLUTION INTRAMUSCULAR; INTRAVENOUS at 13:00

## 2023-12-05 NOTE — H&P
[facility] - History & Physical     Ángel Parikh  1945  2506/1      12/05/23      Identification:  Ángel Parikh is a 78 y.o. male.      PCP:  Jack Platt MD        Chief Complaint: Persistent atrial fibrillation      Allergies:  is allergic to meloxicam, levofloxacin, and percocet [oxycodone-acetaminophen].    Medications Prior to Admission   Medication Sig Dispense Refill Last Dose    aspirin 81 MG EC tablet Take 1 tablet by mouth Daily. Stopped for surgery       atorvastatin (LIPITOR) 20 MG tablet TAKE 1 TABLET AT BEDTIME FOR CHOLESTEROL 30 tablet 11     doxazosin (CARDURA) 4 MG tablet TAKE 1 TABLET EVERY NIGHT 90 tablet 3     doxepin (SINEquan) 50 MG capsule Take 1 capsule by mouth Every Night.       doxycycline (MONODOX) 100 MG capsule Take 1 capsule by mouth 2 (Two) Times a Day. 20 capsule 0     Entresto 24-26 MG tablet Take 1 tablet by mouth 2 (Two) Times a Day. 60 tablet 5     FeroSul 325 (65 Fe) MG tablet Take 1 tablet by mouth Every 12 (Twelve) Hours. 60 tablet 3     fluticasone (FLONASE) 50 MCG/ACT nasal spray 2 sprays into the nostril(s) as directed by provider Daily. 60 mL 2     metoprolol tartrate (LOPRESSOR) 25 MG tablet Take 2 tablets by mouth 2 (Two) Times a Day.       promethazine-dextromethorphan (PROMETHAZINE-DM) 6.25-15 MG/5ML syrup Take 5 mL by mouth 4 (Four) Times a Day As Needed for Cough. 180 mL 0     rivaroxaban (XARELTO) 20 MG tablet Take 1 tablet by mouth Daily.       Vibegron (Gemtesa) 75 MG tablet Take 1 tablet by mouth Daily.       vitamin B-12 (CYANOCOBALAMIN) 1000 MCG tablet Take 0.5 tablets by mouth Daily.       vitamin C (ASCORBIC ACID) 500 MG tablet Take 2 tablets by mouth Daily.                 HPI:  Ángel Praikh is a 78-year-old   male who presents to [facility]/CVICU today to undergo ECV under the care of Dr. Scott.  He has a PMHx HTN, MAE/CPAP compliant, mild obesity, GERD, persistent A-fib.  He was diagnosed with COVID 19 September  of this year and since then has had persistent dyspnea, fatigue.  Cough has essentially resolved.  He was admitted to local hospital, treated for atrial fibrillation.  Since discharge, his symptoms have not improved. His metoprolol was initially increased without conversion to sinus rhythm.  He wore an outpatient monitor which demonstrated A-fib throughout with minimum heart rate 45, average heart rate 88, maximum heart rate 175.  He came in for follow-up with continued symptoms and was started on flecainide 50 mg twice daily in addition to his metoprolol.  He is on Xarelto for stroke prevention.  He presents today for further evaluation.  He believes he is still in A-fib as he has continued dyspnea and fatigue.  He can barely walk across the floor at home without getting short winded.  He denies angina or chest pain, denies orthopnea or PND, denies pedal edema.  There have been no bleeding issues on the NOAC.          Social History     Socioeconomic History    Marital status:    Tobacco Use    Smoking status: Former     Types: Pipe, Cigars     Quit date:      Years since quittin.9    Smokeless tobacco: Never    Tobacco comments:     smoked 3 cigars a day and pipe off and on    Vaping Use    Vaping Use: Never used   Substance and Sexual Activity    Alcohol use: Not Currently    Drug use: Never    Sexual activity: Defer     Family History   Problem Relation Age of Onset    Stroke Mother     Hypertension Mother     Heart attack Father     Hypertension Father     Cirrhosis Brother      Past Surgical History:   Procedure Laterality Date    CARDIAC CATHETERIZATION N/A 2021    Procedure: Left Heart Cath 46420;  Surgeon: Reggie Scott MD;  Location: [facility] CATH INVASIVE LOCATION;  Service: Cardiovascular;  Laterality: N/A;    COLONOSCOPY  11/10/2015    MOD SIGMOID DIVERTICULOSIS, NO POLYPS. PLAN: FIBER, REPEAT IN 10 YEARS.    COLONOSCOPY  2005    DIVERTICULOSIS, INT HEMERRHOIDS. NO  POLYPS. EGD- REFLUX, NO BLEEDING. FOLLOW UP CAPSULE ENDOSCOPY AT  2012-NORMAL    CYSTOSCOPY AND NEEDLE BIOPSY PROSTATE      twice; last time 2017    PROSTATECTOMY N/A 10/03/2017    Procedure: PROSTATECTOMY LAPAROSCOPIC WITH DAVINCI ROBOT;  Surgeon: Jameel Ferguson Jr., MD;  Location: [facility];  Service:        Review of Systems:  Pertinent positives are listed above and in physical exam.  All others have been reviewed and are negative.     Objective:   Vitals:   vitals were not taken for this visit.  No intake or output data in the 24 hours ending 12/05/23 1105 vitals reviewed in room.      Physical Exam:  General Appearance:    Alert, cooperative, in no acute distress   Head:    Normocephalic, without obvious abnormality, atraumatic   Eyes:            Lids and lashes normal, conjunctivae and sclerae normal, no   icterus, no pallor, corneas clear, PERRLA. + Glasses   Ears:    Ears appear intact with no abnormalities noted   Throat:   No oral lesions, no thrush, oral mucosa moist   Neck:   No adenopathy, supple, trachea midline, no thyromegaly, no carotid bruit, no JVD   Back:     No kyphosis present, no scoliosis present, no skin lesions,   erythema or scars, no tenderness to percussion or                  palpation,  range of motion normal   Lungs:     Clear to auscultation,respirations regular, even and               unlabored    Heart:    Irregular rhythm and normal rate, normal S1 and S2, 2/6        murmur, no gallop, no rub, no click   Abdomen:     Normal bowel sounds, no masses, no organomegaly, soft     nontender, nondistended, no guarding, no rebound               tenderness.  Obese   Extremities:   Moves all extremities well,  no cyanosis, no redness, no edema   Pulses:   Pulses palpable and equal bilaterally   Skin:   No bleeding, bruising or rash   Lymph nodes:   No palpable adenopathy   Neurologic:   Cranial nerves 2 - 12 grossly intact, sensation intact, DTR    present and equal bilaterally  "         Results Review:  I personally reviewed the patient's clinical results.              Invalid input(s): \"LABALBU\", \"PROT\"                        Radiology:  Imaging Results (Last 72 Hours)       ** No results found for the last 72 hours. **            Tele: Atrial fibrillation    Assessment and Plan:    1.  78-year-old  male with persistent A-fib presents today to undergo ECV under the care of Dr. Scott.  Risk and benefits have been reviewed with the patient he is willing to proceed.  Further recommendations based on outcomes.  -Continue flecainide and metoprolol.  Patient does report extreme fatigue with the metoprolol dosing and is asking if this can be adjusted post procedure.  -Continue Xarelto for stroke prevention, therapy uninterrupted.    2.  HTN, controlled  3.  MAE, CPAP compliant      I discussed the patient's findings and my recommendations with the patient, any present family members, and the nursing staff.  Reggie Scott MD saw and examined patient, verified hx and PE, read all radiographic studies, reviewed labs and micro data, and formulated dx, plan for treatment and all medical decision making.      Judith Hensley PA-C for Reggie Scott MD  12/05/23 11:05 EST      "

## 2023-12-06 LAB
QT INTERVAL: 462 MS
QTC INTERVAL: 441 MS

## 2023-12-28 ENCOUNTER — OFFICE VISIT (OUTPATIENT)
Dept: FAMILY MEDICINE CLINIC | Facility: CLINIC | Age: 78
End: 2023-12-28
Payer: MEDICARE

## 2023-12-28 VITALS
WEIGHT: 224.8 LBS | SYSTOLIC BLOOD PRESSURE: 120 MMHG | BODY MASS INDEX: 35.28 KG/M2 | DIASTOLIC BLOOD PRESSURE: 76 MMHG | OXYGEN SATURATION: 97 % | HEIGHT: 67 IN | HEART RATE: 76 BPM

## 2023-12-28 DIAGNOSIS — J30.9 ALLERGIC RHINITIS, UNSPECIFIED SEASONALITY, UNSPECIFIED TRIGGER: ICD-10-CM

## 2023-12-28 DIAGNOSIS — D50.8 OTHER IRON DEFICIENCY ANEMIA: Primary | ICD-10-CM

## 2023-12-28 DIAGNOSIS — I48.0 PAROXYSMAL ATRIAL FIBRILLATION: ICD-10-CM

## 2023-12-28 PROBLEM — N42.9 PROSTATE DISORDER: Status: ACTIVE | Noted: 2023-12-28

## 2023-12-28 PROCEDURE — 99213 OFFICE O/P EST LOW 20 MIN: CPT | Performed by: PHYSICIAN ASSISTANT

## 2023-12-28 PROCEDURE — 3074F SYST BP LT 130 MM HG: CPT | Performed by: PHYSICIAN ASSISTANT

## 2023-12-28 PROCEDURE — 1159F MED LIST DOCD IN RCRD: CPT | Performed by: PHYSICIAN ASSISTANT

## 2023-12-28 PROCEDURE — 3078F DIAST BP <80 MM HG: CPT | Performed by: PHYSICIAN ASSISTANT

## 2023-12-28 PROCEDURE — 1160F RVW MEDS BY RX/DR IN RCRD: CPT | Performed by: PHYSICIAN ASSISTANT

## 2023-12-28 RX ORDER — RIBOFLAVIN (VITAMIN B2) 100 MG
TABLET ORAL
COMMUNITY

## 2023-12-28 RX ORDER — DOXEPIN HYDROCHLORIDE 10 MG/1
CAPSULE ORAL
COMMUNITY

## 2023-12-28 RX ORDER — OXYBUTYNIN CHLORIDE 10 MG/1
10 TABLET, EXTENDED RELEASE ORAL DAILY
COMMUNITY

## 2023-12-28 NOTE — PROGRESS NOTES
"Chief Complaint  Allergies (X days)    Subjective          Ángel Parikh presents to [facility]    History of Present Illness  Patient is here today complaining of \"allergies\" but he says they do not need to be treated and he is not really sure exactly why he is here.  I reviewed his previous office visits and it turns out he was seen in October for anemia due to iron deficiency and was advised to follow-up in a couple of months and I suspect that is what he is here for today.  He says he really does not have any weakness or fatigue and he feels fine now.  He has been going through issues with his atrial fibrillation and the cardiologist is working to try and get him back in normal rhythm which as of yet has not been successful.  He also now noted that he had recent blood work and he did not think they mentioned the anemia.    Objective   Vital Signs:   /76 (BP Location: Right arm, Cuff Size: Large Adult)   Pulse 76   Ht 170.2 cm (67\")   Wt 102 kg (224 lb 12.8 oz)   SpO2 97%   BMI 35.21 kg/m²     Body mass index is 35.21 kg/m².    Review of Systems   Constitutional:  Negative for fatigue.   Eyes:  Negative for blurred vision.   Respiratory:  Negative for cough, chest tightness and shortness of breath.    Cardiovascular:  Negative for chest pain, palpitations and leg swelling.   Gastrointestinal:  Negative for abdominal pain, constipation, diarrhea, nausea and vomiting.   Neurological:  Negative for dizziness, tremors and headache.   Psychiatric/Behavioral:  Negative for depressed mood. The patient is not nervous/anxious.        Past History:  Medical History: has a past medical history of Anemia (11/22/2005), Arthritis, Cancer, Colonic polyp, Elevated PSA, Encounter for screening colonoscopy, Essential hypertension, Gastroesophageal reflux disease without esophagitis, H/O valvular heart disease, Hearing loss, Heart murmur, Hemorrhoid, Hiatal hernia, High risk medication " use, History of cellulitis, History of tobacco use, Hypercholesterolemia, Hyperglycemia, Hyperlipidemia, Morbid obesity, Osteoarthritis, Overactive bladder, Primary osteoarthritis involving multiple joints, Prostate cancer, Psoriasis, Restless leg syndrome, Sleep apnea with use of continuous positive airway pressure (CPAP), Vitamin B12 deficiency, Vitamin D deficiency, Wears glasses, Wears hearing aid, and Wears partial dentures.   Surgical History: has a past surgical history that includes Colonoscopy (11/10/2015); cystoscopy and needle biopsy prostate; Prostatectomy (N/A, 10/03/2017); Cardiac catheterization (N/A, 01/08/2021); and Colonoscopy (11/22/2005).   Family History: family history includes Cirrhosis in his brother; Heart attack in his father; Hypertension in his father and mother; Stroke in his mother.   Social History: reports that he quit smoking about 34 years ago. His smoking use included pipe and cigars. He has never used smokeless tobacco. He reports that he does not currently use alcohol. He reports that he does not use drugs.      Current Outpatient Medications:     aspirin 81 MG EC tablet, Take 1 tablet by mouth Daily. Stopped for surgery, Disp: , Rfl:     atorvastatin (LIPITOR) 20 MG tablet, TAKE 1 TABLET AT BEDTIME FOR CHOLESTEROL, Disp: 30 tablet, Rfl: 11    doxazosin (CARDURA) 4 MG tablet, TAKE 1 TABLET EVERY NIGHT, Disp: 90 tablet, Rfl: 3    Entresto 24-26 MG tablet, Take 1 tablet by mouth 2 (Two) Times a Day., Disp: 60 tablet, Rfl: 5    flecainide (TAMBOCOR) 50 MG tablet, Take 1 tablet by mouth Every 12 (Twelve) Hours., Disp: , Rfl:     fluticasone (FLONASE) 50 MCG/ACT nasal spray, 2 sprays into the nostril(s) as directed by provider Daily., Disp: 60 mL, Rfl: 2    metoprolol tartrate (LOPRESSOR) 25 MG tablet, Take 2 tablets by mouth 2 (Two) Times a Day., Disp: , Rfl:     multivitamin with minerals (MULTIVITAMIN ADULT PO), Take 1 tablet by mouth Daily., Disp: , Rfl:     pantoprazole  (PROTONIX) 40 MG EC tablet, Take 1 tablet by mouth Daily., Disp: , Rfl:     rivaroxaban (XARELTO) 20 MG tablet, Take 1 tablet by mouth Daily., Disp: , Rfl:     Vibegron (Gemtesa) 75 MG tablet, Take 1 tablet by mouth Daily., Disp: , Rfl:     vitamin B-12 (CYANOCOBALAMIN) 1000 MCG tablet, Take 0.5 tablets by mouth Daily., Disp: , Rfl:     vitamin C (ASCORBIC ACID) 500 MG tablet, Take 2 tablets by mouth Daily., Disp: , Rfl:     ascorbic acid (VITAMIN C) 100 MG tablet, Take  by mouth., Disp: , Rfl:     doxepin (SINEquan) 10 MG capsule, Take  by mouth., Disp: , Rfl:     oxybutynin XL (DITROPAN-XL) 10 MG 24 hr tablet, Take 1 tablet by mouth Daily., Disp: , Rfl:     Allergies: Meloxicam, Levofloxacin, and Percocet [oxycodone-acetaminophen]    Physical Exam  Vitals reviewed.   Constitutional:       Appearance: Normal appearance.   Cardiovascular:      Rate and Rhythm: Normal rate and regular rhythm.      Heart sounds: Normal heart sounds.   Pulmonary:      Effort: Pulmonary effort is normal.      Breath sounds: Normal breath sounds.   Abdominal:      General: Bowel sounds are normal.      Palpations: Abdomen is soft.   Musculoskeletal:         General: Normal range of motion.   Neurological:      General: No focal deficit present.      Mental Status: He is alert and oriented to person, place, and time.   Psychiatric:         Mood and Affect: Mood normal.          Result Review :                   Assessment and Plan    Diagnoses and all orders for this visit:    1. Other iron deficiency anemia (Primary)  Assessment & Plan:  I did review the blood work he had at the beginning of December and it appears that his anemia has resolved.  He actually has already stopped taking the iron tablets due to side effects and we will not resume them at this time.      2. Paroxysmal atrial fibrillation  Assessment & Plan:  He is still in atrial for based on exam today but his heart rate is controlled and he will follow-up with Dr. Scott  as planned for additional treatment.      3. Allergic rhinitis, unspecified seasonality, unspecified trigger  Assessment & Plan:  He does occasionally use Flonase nasal spray and an allergy tablet that has been approved by his cardiologist and there is no sign of any infection so no further treatment is recommended at this time.          Follow Up   Return in about 3 months (around 3/28/2024) for Medicare Wellness, Recheck.  Patient was given instructions and counseling regarding his condition or for health maintenance advice. Please see specific information pulled into the AVS if appropriate.     Candy Patterson PA-C

## 2023-12-29 PROBLEM — J30.9 ALLERGIC RHINITIS: Status: ACTIVE | Noted: 2023-12-29

## 2023-12-29 NOTE — ASSESSMENT & PLAN NOTE
I did review the blood work he had at the beginning of December and it appears that his anemia has resolved.  He actually has already stopped taking the iron tablets due to side effects and we will not resume them at this time.

## 2023-12-29 NOTE — ASSESSMENT & PLAN NOTE
He does occasionally use Flonase nasal spray and an allergy tablet that has been approved by his cardiologist and there is no sign of any infection so no further treatment is recommended at this time.

## 2023-12-29 NOTE — ASSESSMENT & PLAN NOTE
He is still in atrial for based on exam today but his heart rate is controlled and he will follow-up with Dr. Scott as planned for additional treatment.

## 2024-01-15 ENCOUNTER — OFFICE VISIT (OUTPATIENT)
Dept: CARDIOLOGY | Facility: CLINIC | Age: 79
End: 2024-01-15
Payer: MEDICARE

## 2024-01-15 VITALS
HEIGHT: 67 IN | HEART RATE: 70 BPM | WEIGHT: 220 LBS | DIASTOLIC BLOOD PRESSURE: 62 MMHG | OXYGEN SATURATION: 96 % | SYSTOLIC BLOOD PRESSURE: 102 MMHG | BODY MASS INDEX: 34.53 KG/M2

## 2024-01-15 DIAGNOSIS — G47.33 OSA ON CPAP: ICD-10-CM

## 2024-01-15 DIAGNOSIS — I48.19 ATRIAL FIBRILLATION, PERSISTENT: Primary | ICD-10-CM

## 2024-01-15 DIAGNOSIS — Z79.01 CHRONIC ANTICOAGULATION: ICD-10-CM

## 2024-01-15 DIAGNOSIS — Z79.899 LONG TERM CURRENT USE OF ANTIARRHYTHMIC DRUG: ICD-10-CM

## 2024-01-15 PROBLEM — I48.0 PAROXYSMAL ATRIAL FIBRILLATION: Status: RESOLVED | Noted: 2023-10-05 | Resolved: 2024-01-15

## 2024-01-15 RX ORDER — QUINIDINE GLUCONATE 324 MG
1 TABLET, EXTENDED RELEASE ORAL DAILY
COMMUNITY
Start: 2024-01-10

## 2024-01-15 NOTE — H&P (VIEW-ONLY)
Cardiac Electrophysiology Outpatient Consult Note            [facility] at [facility]    Consult Note     Ángel Parikh  [id number]  01/15/2024  [phone]     Primary Care Physician: Candy Patterson PA-C    Referred By: Reggie Scott MD    Subjective     Chief Complaint:   Diagnoses and all orders for this visit:    1. Atrial fibrillation, persistent (Primary)    2. Chronic anticoagulation    3. Long term current use of antiarrhythmic drug    4. MAE on CPAP      Chief Complaint   Patient presents with   • Establish Care     AFib       History of Present Illness:   Ángel Parikh is a 78 y.o. male who presents to my electrophysiology clinic for evaluation of persistent atrial fibrillation.    Here to discuss the option of catheter ablation he states.    Referred by his cardiologist Dr. Scott    Patient has had A-fib for about 2 years initially the episodes are short and not long-lasting.  He has had however several episodes that have lasted several months.  Most recently he was cardioverted from atrial fibrillation at the very end of 2023 after being in A-fib for continuous.  Starting in September.  Flecainide was initiated.  In spite of this he remained in sinus rhythm only for 3 days and then had recurrence of A-fib.    He has no difficulty taking Xarelto.      Past Medical History:   Past Medical History:   Diagnosis Date   • Anemia 11/22/2005   • Arthritis     multi joint jose left knee    • Cancer     prostate per elevated PSA and biopsies    • Colonic polyp    • Elevated PSA    • Encounter for screening colonoscopy    • Essential hypertension    • Gastroesophageal reflux disease without esophagitis    • H/O valvular heart disease     3 leaky valves- monitored by [facility]MD and Dr Scott (sees every 6 months)   • Hearing loss     wears aid on rt ear    • Heart murmur    • Hemorrhoid     bleeds on occasion    • Hiatal hernia    • High risk medication use    • History of  cellulitis    • History of tobacco use     QUIT AT AGE 51   • Hypercholesterolemia    • Hyperglycemia    • Hyperlipidemia    • Morbid obesity    • Osteoarthritis    • Overactive bladder    • Primary osteoarthritis involving multiple joints    • Prostate cancer    • Psoriasis     spots on legs    • Restless leg syndrome    • Sleep apnea with use of continuous positive airway pressure (CPAP)     7-12 auto setting    • Vitamin B12 deficiency    • Vitamin D deficiency    • Wears glasses    • Wears hearing aid     right    • Wears partial dentures        Past Surgical History:   Past Surgical History:   Procedure Laterality Date   • CARDIAC CATHETERIZATION N/A 2021    Procedure: Left Heart Cath 36082;  Surgeon: Reggie Scott MD;  Location:  [facility];  Service: Cardiovascular;  Laterality: N/A;   • COLONOSCOPY  11/10/2015    MOD SIGMOID DIVERTICULOSIS, NO POLYPS. PLAN: FIBER, REPEAT IN 10 YEARS.   • COLONOSCOPY  2005    DIVERTICULOSIS, INT HEMERRHOIDS. NO POLYPS. EGD- REFLUX, NO BLEEDING. FOLLOW UP CAPSULE ENDOSCOPY AT  -NORMAL   • CYSTOSCOPY AND NEEDLE BIOPSY PROSTATE      twice; last time    • PROSTATECTOMY N/A 10/03/2017    Procedure: PROSTATECTOMY LAPAROSCOPIC WITH DAVINCI ROBOT;  Surgeon: Jameel Ferguson Jr., MD;  Location: [facility] OR;  Service:        Family History:   Family History   Problem Relation Age of Onset   • Stroke Mother    • Hypertension Mother    • Heart attack Father    • Hypertension Father    • Cirrhosis Brother        Social History:   Social History     Socioeconomic History   • Marital status:    Tobacco Use   • Smoking status: Former     Types: Pipe, Cigars     Quit date:      Years since quittin.0     Passive exposure: Past   • Smokeless tobacco: Never   • Tobacco comments:     smoked 3 cigars a day and pipe off and on    Vaping Use   • Vaping Use: Never used   Substance and Sexual Activity   • Alcohol use: Not Currently   •  Drug use: Never   • Sexual activity: Defer       Medications:     Current Outpatient Medications:   •  aspirin 81 MG EC tablet, Take 1 tablet by mouth Daily. Stopped for surgery, Disp: , Rfl:   •  atorvastatin (LIPITOR) 20 MG tablet, TAKE 1 TABLET AT BEDTIME FOR CHOLESTEROL, Disp: 30 tablet, Rfl: 11  •  doxazosin (CARDURA) 4 MG tablet, TAKE 1 TABLET EVERY NIGHT, Disp: 90 tablet, Rfl: 3  •  doxepin (SINEquan) 10 MG capsule, Take  by mouth., Disp: , Rfl:   •  Entresto 24-26 MG tablet, Take 1 tablet by mouth 2 (Two) Times a Day., Disp: 60 tablet, Rfl: 5  •  ferrous gluconate (FERGON) 240 (27 FE) MG tablet, Take 1 tablet by mouth Daily., Disp: , Rfl:   •  fluticasone (FLONASE) 50 MCG/ACT nasal spray, 2 sprays into the nostril(s) as directed by provider Daily., Disp: 60 mL, Rfl: 2  •  metoprolol tartrate (LOPRESSOR) 25 MG tablet, Take 2 tablets by mouth 2 (Two) Times a Day. 100mg twice a day, Disp: , Rfl:   •  multivitamin with minerals (MULTIVITAMIN ADULT PO), Take 1 tablet by mouth Daily., Disp: , Rfl:   •  pantoprazole (PROTONIX) 40 MG EC tablet, Take 1 tablet by mouth Daily., Disp: , Rfl:   •  rivaroxaban (XARELTO) 20 MG tablet, Take 1 tablet by mouth Daily., Disp: , Rfl:   •  Vibegron (Gemtesa) 75 MG tablet, Take 1 tablet by mouth Daily., Disp: , Rfl:   •  ascorbic acid (VITAMIN C) 100 MG tablet, Take  by mouth. (Patient not taking: Reported on 1/15/2024), Disp: , Rfl:   •  flecainide (TAMBOCOR) 50 MG tablet, Take 1 tablet by mouth Every 12 (Twelve) Hours. (Patient not taking: Reported on 1/15/2024), Disp: , Rfl:   •  oxybutynin XL (DITROPAN-XL) 10 MG 24 hr tablet, Take 1 tablet by mouth Daily. (Patient not taking: Reported on 1/15/2024), Disp: , Rfl:   •  vitamin B-12 (CYANOCOBALAMIN) 1000 MCG tablet, Take 0.5 tablets by mouth Daily. (Patient not taking: Reported on 1/15/2024), Disp: , Rfl:   •  vitamin C (ASCORBIC ACID) 500 MG tablet, Take 2 tablets by mouth Daily. (Patient not taking: Reported on 1/15/2024),  "Disp: , Rfl:     Allergies:   Allergies   Allergen Reactions   • Meloxicam Swelling   • Levofloxacin Itching and Rash   • Percocet [Oxycodone-Acetaminophen]      Chest and back red       Objective   Vital Signs:   Vitals:    01/15/24 0936   BP: 102/62   BP Location: Right arm   Patient Position: Sitting   Pulse: 70   SpO2: 96%   Weight: 99.8 kg (220 lb)   Height: 170.2 cm (67\")       PHYSICAL EXAM  General appearance: Awake, alert, cooperative  Head: Normocephalic, without obvious abnormality, atraumatic  Eyes: Conjunctivae/corneas clear, EOMs intact  Neck: no adenopathy, no carotid bruit, no JVD, and thyroid: not enlarged  Lungs: clear to auscultation bilaterally and no rhonchi or crackles\", ' symmetric  Heart: irregularly irregular rhythm  Abdomen: Soft, non-tender, bowel sounds normal,  no organomegaly  Extremities: extremities normal, atraumatic, no cyanosis or edema  Skin: Skin color, turgor normal, no rashes or lesions  Neurologic: Grossly normal     Lab Results   Component Value Date    GLUCOSE 139 (H) 12/05/2023    CALCIUM 9.1 12/05/2023     12/05/2023    K 4.0 12/05/2023    CO2 27.0 12/05/2023     12/05/2023    BUN 14 12/05/2023    CREATININE 0.88 12/05/2023    EGFRIFNONA 75 11/10/2021    BCR 15.9 12/05/2023    ANIONGAP 9.0 12/05/2023     Lab Results   Component Value Date    WBC 5.48 12/05/2023    HGB 12.8 (L) 12/05/2023    HCT 39.5 12/05/2023    MCV 90.6 12/05/2023     12/05/2023     Lab Results   Component Value Date    INR 0.98 09/29/2017    PROTIME 10.7 09/29/2017     Lab Results   Component Value Date    TSH 4.680 (H) 06/01/2022       Cardiac Testing:      I personally viewed and interpreted the patient's EKG/Telemetry/lab data      ECG 12 Lead    Date/Time: 1/15/2024 10:29 AM  Performed by: Luis Atwood DO    Authorized by: Luis Atwood DO  Comparison: compared with previous ECG   Similar to previous ECG  Rhythm: atrial fibrillation      Tobacco Cessation: N/A  Obstructive " Sleep Apnea Screening: Completed    Advance Care Planning   ACP discussion was declined by the patient. Patient does not have an advance directive, declines further assistance.       Assessment & Plan    Diagnoses and all orders for this visit:    1. Atrial fibrillation, persistent (Primary)    2. Chronic anticoagulation    3. Long term current use of antiarrhythmic drug    4. MAE on CPAP         Diagnosis Plan   1. Atrial fibrillation, persistent  Highly symptomatic persistent atrial fibrillation.  Refractory to medical therapy.  Has failed flecainide.    Symptoms are severe.    He is interested in catheter ablation.  This is his first choice.    We did discuss the option of alternative or increased doses of antiarrhythmic drugs however the data would suggest that the chance of durable freedom from A-fib with this approach is not high, most patients would have continued episodes of A-fib that are symptomatic.    Additionally he tells me that he really does not want to keep taking his medications.  He is convinced that they make him feel fatigued and tired.    We discussed the option of catheter ablation in detail.  I reviewed the specific risk-benefit profile the procedure.  I quoted the patient a 1 to 2% chance of significant procedure complication including but not limited to bleeding at the groin, cardiac perforation, tamponade, stroke, myocardial infarction, death phrenic nerve injury, pulmonary vein stenosis, catheter entrapment of the mitral valve annulus, esophageal injury as well as fistula and other potential complications.    His most recent cardioversion was after he had been in atrial fibrillation from September through November.  Lasted 3 days.  He has been on flecainide since then and has had continued atrial fibrillation.    The longest episode of A-fib has been several months and certainly less than 12 months however.    He wishes to proceed and to be scheduled for an A-fib ablation.    The patient  and I made a mutually shared decision ( shared decision making model ) that the patient would be best served by proceeding with the above invasive heart procedure.  This is a high risk invasive cardiac procedure which comes with significant risk of morbidity and mortality.  This patient has significant underlying  heart disease.  Ángel Parikh understands these risks and   has made an informed decision to proceed.    PFA      2. Chronic anticoagulation  Lifelong anticoagulation recommended.  [id number] significantly elevated.      3. Long term current use of antiarrhythmic drug  Flecainide.  Ineffective.  Breakthrough symptoms.  Intolerance as well.      4. MAE on CPAP  100% CPAP compliant.  Good for him.        Body mass index is 34.46 kg/m².    I spent 48 minutes in consultation with this patient which included more than 65% of this time in direct face-to-face counseling, physical examination and discussion of my assessment and findings and this shared decision making with the patient.  The remainder of the time not spent face-to-face was performing one, some or all of the following actions: preparing to see the patient (e.g. reviewing tests, prior clinicians' notes, etc), ordering medications, tests or procedures, coordination of care, discussion of the plan with other healthcare providers, documenting clinical information in epic as well as independently interpreting results and communication of these results to the patient family and/or caregiver(s).  Please note that this explicitly excludes time spent on other separate billable services such as performing procedures or test interpretation, when applicable.    Follow Up:       Thank you for allowing me to participate in the care of your patient. Please to not hesitate to contact me with additional questions or concerns.      Luis Atwood, DO, FACC, RS  Cardiac Electrophysiologist  [facility] / [facility]

## 2024-01-15 NOTE — PROGRESS NOTES
Cardiac Electrophysiology Outpatient Consult Note            [facility] at [facility]    Consult Note     Ángel Parikh  [id number]  01/15/2024  [phone]     Primary Care Physician: Candy Patterson PA-C    Referred By: Reggie Scott MD    Subjective     Chief Complaint:   Diagnoses and all orders for this visit:    1. Atrial fibrillation, persistent (Primary)    2. Chronic anticoagulation    3. Long term current use of antiarrhythmic drug    4. MAE on CPAP      Chief Complaint   Patient presents with   • Establish Care     AFib       History of Present Illness:   Ángel Parikh is a 78 y.o. male who presents to my electrophysiology clinic for evaluation of persistent atrial fibrillation.    Here to discuss the option of catheter ablation he states.    Referred by his cardiologist Dr. Scott    Patient has had A-fib for about 2 years initially the episodes are short and not long-lasting.  He has had however several episodes that have lasted several months.  Most recently he was cardioverted from atrial fibrillation at the very end of 2023 after being in A-fib for continuous.  Starting in September.  Flecainide was initiated.  In spite of this he remained in sinus rhythm only for 3 days and then had recurrence of A-fib.    He has no difficulty taking Xarelto.      Past Medical History:   Past Medical History:   Diagnosis Date   • Anemia 11/22/2005   • Arthritis     multi joint jose left knee    • Cancer     prostate per elevated PSA and biopsies    • Colonic polyp    • Elevated PSA    • Encounter for screening colonoscopy    • Essential hypertension    • Gastroesophageal reflux disease without esophagitis    • H/O valvular heart disease     3 leaky valves- monitored by [facility] and Dr Scott (sees every 6 months)   • Hearing loss     wears aid on rt ear    • Heart murmur    • Hemorrhoid     bleeds on occasion    • Hiatal hernia    • High risk medication use    • History of  cellulitis    • History of tobacco use     QUIT AT AGE 51   • Hypercholesterolemia    • Hyperglycemia    • Hyperlipidemia    • Morbid obesity    • Osteoarthritis    • Overactive bladder    • Primary osteoarthritis involving multiple joints    • Prostate cancer    • Psoriasis     spots on legs    • Restless leg syndrome    • Sleep apnea with use of continuous positive airway pressure (CPAP)     7-12 auto setting    • Vitamin B12 deficiency    • Vitamin D deficiency    • Wears glasses    • Wears hearing aid     right    • Wears partial dentures        Past Surgical History:   Past Surgical History:   Procedure Laterality Date   • CARDIAC CATHETERIZATION N/A 2021    Procedure: Left Heart Cath 29149;  Surgeon: Reggie Scott MD;  Location:  [facility];  Service: Cardiovascular;  Laterality: N/A;   • COLONOSCOPY  11/10/2015    MOD SIGMOID DIVERTICULOSIS, NO POLYPS. PLAN: FIBER, REPEAT IN 10 YEARS.   • COLONOSCOPY  2005    DIVERTICULOSIS, INT HEMERRHOIDS. NO POLYPS. EGD- REFLUX, NO BLEEDING. FOLLOW UP CAPSULE ENDOSCOPY AT  -NORMAL   • CYSTOSCOPY AND NEEDLE BIOPSY PROSTATE      twice; last time    • PROSTATECTOMY N/A 10/03/2017    Procedure: PROSTATECTOMY LAPAROSCOPIC WITH DAVINCI ROBOT;  Surgeon: Jameel Ferguson Jr., MD;  Location: [facility] OR;  Service:        Family History:   Family History   Problem Relation Age of Onset   • Stroke Mother    • Hypertension Mother    • Heart attack Father    • Hypertension Father    • Cirrhosis Brother        Social History:   Social History     Socioeconomic History   • Marital status:    Tobacco Use   • Smoking status: Former     Types: Pipe, Cigars     Quit date:      Years since quittin.0     Passive exposure: Past   • Smokeless tobacco: Never   • Tobacco comments:     smoked 3 cigars a day and pipe off and on    Vaping Use   • Vaping Use: Never used   Substance and Sexual Activity   • Alcohol use: Not Currently   •  Drug use: Never   • Sexual activity: Defer       Medications:     Current Outpatient Medications:   •  aspirin 81 MG EC tablet, Take 1 tablet by mouth Daily. Stopped for surgery, Disp: , Rfl:   •  atorvastatin (LIPITOR) 20 MG tablet, TAKE 1 TABLET AT BEDTIME FOR CHOLESTEROL, Disp: 30 tablet, Rfl: 11  •  doxazosin (CARDURA) 4 MG tablet, TAKE 1 TABLET EVERY NIGHT, Disp: 90 tablet, Rfl: 3  •  doxepin (SINEquan) 10 MG capsule, Take  by mouth., Disp: , Rfl:   •  Entresto 24-26 MG tablet, Take 1 tablet by mouth 2 (Two) Times a Day., Disp: 60 tablet, Rfl: 5  •  ferrous gluconate (FERGON) 240 (27 FE) MG tablet, Take 1 tablet by mouth Daily., Disp: , Rfl:   •  fluticasone (FLONASE) 50 MCG/ACT nasal spray, 2 sprays into the nostril(s) as directed by provider Daily., Disp: 60 mL, Rfl: 2  •  metoprolol tartrate (LOPRESSOR) 25 MG tablet, Take 2 tablets by mouth 2 (Two) Times a Day. 100mg twice a day, Disp: , Rfl:   •  multivitamin with minerals (MULTIVITAMIN ADULT PO), Take 1 tablet by mouth Daily., Disp: , Rfl:   •  pantoprazole (PROTONIX) 40 MG EC tablet, Take 1 tablet by mouth Daily., Disp: , Rfl:   •  rivaroxaban (XARELTO) 20 MG tablet, Take 1 tablet by mouth Daily., Disp: , Rfl:   •  Vibegron (Gemtesa) 75 MG tablet, Take 1 tablet by mouth Daily., Disp: , Rfl:   •  ascorbic acid (VITAMIN C) 100 MG tablet, Take  by mouth. (Patient not taking: Reported on 1/15/2024), Disp: , Rfl:   •  flecainide (TAMBOCOR) 50 MG tablet, Take 1 tablet by mouth Every 12 (Twelve) Hours. (Patient not taking: Reported on 1/15/2024), Disp: , Rfl:   •  oxybutynin XL (DITROPAN-XL) 10 MG 24 hr tablet, Take 1 tablet by mouth Daily. (Patient not taking: Reported on 1/15/2024), Disp: , Rfl:   •  vitamin B-12 (CYANOCOBALAMIN) 1000 MCG tablet, Take 0.5 tablets by mouth Daily. (Patient not taking: Reported on 1/15/2024), Disp: , Rfl:   •  vitamin C (ASCORBIC ACID) 500 MG tablet, Take 2 tablets by mouth Daily. (Patient not taking: Reported on 1/15/2024),  "Disp: , Rfl:     Allergies:   Allergies   Allergen Reactions   • Meloxicam Swelling   • Levofloxacin Itching and Rash   • Percocet [Oxycodone-Acetaminophen]      Chest and back red       Objective   Vital Signs:   Vitals:    01/15/24 0936   BP: 102/62   BP Location: Right arm   Patient Position: Sitting   Pulse: 70   SpO2: 96%   Weight: 99.8 kg (220 lb)   Height: 170.2 cm (67\")       PHYSICAL EXAM  General appearance: Awake, alert, cooperative  Head: Normocephalic, without obvious abnormality, atraumatic  Eyes: Conjunctivae/corneas clear, EOMs intact  Neck: no adenopathy, no carotid bruit, no JVD, and thyroid: not enlarged  Lungs: clear to auscultation bilaterally and no rhonchi or crackles\", ' symmetric  Heart: irregularly irregular rhythm  Abdomen: Soft, non-tender, bowel sounds normal,  no organomegaly  Extremities: extremities normal, atraumatic, no cyanosis or edema  Skin: Skin color, turgor normal, no rashes or lesions  Neurologic: Grossly normal     Lab Results   Component Value Date    GLUCOSE 139 (H) 12/05/2023    CALCIUM 9.1 12/05/2023     12/05/2023    K 4.0 12/05/2023    CO2 27.0 12/05/2023     12/05/2023    BUN 14 12/05/2023    CREATININE 0.88 12/05/2023    EGFRIFNONA 75 11/10/2021    BCR 15.9 12/05/2023    ANIONGAP 9.0 12/05/2023     Lab Results   Component Value Date    WBC 5.48 12/05/2023    HGB 12.8 (L) 12/05/2023    HCT 39.5 12/05/2023    MCV 90.6 12/05/2023     12/05/2023     Lab Results   Component Value Date    INR 0.98 09/29/2017    PROTIME 10.7 09/29/2017     Lab Results   Component Value Date    TSH 4.680 (H) 06/01/2022       Cardiac Testing:      I personally viewed and interpreted the patient's EKG/Telemetry/lab data      ECG 12 Lead    Date/Time: 1/15/2024 10:29 AM  Performed by: Luis Atwood DO    Authorized by: Luis Atwood DO  Comparison: compared with previous ECG   Similar to previous ECG  Rhythm: atrial fibrillation      Tobacco Cessation: N/A  Obstructive " Sleep Apnea Screening: Completed    Advance Care Planning   ACP discussion was declined by the patient. Patient does not have an advance directive, declines further assistance.       Assessment & Plan    Diagnoses and all orders for this visit:    1. Atrial fibrillation, persistent (Primary)    2. Chronic anticoagulation    3. Long term current use of antiarrhythmic drug    4. MAE on CPAP         Diagnosis Plan   1. Atrial fibrillation, persistent  Highly symptomatic persistent atrial fibrillation.  Refractory to medical therapy.  Has failed flecainide.    Symptoms are severe.    He is interested in catheter ablation.  This is his first choice.    We did discuss the option of alternative or increased doses of antiarrhythmic drugs however the data would suggest that the chance of durable freedom from A-fib with this approach is not high, most patients would have continued episodes of A-fib that are symptomatic.    Additionally he tells me that he really does not want to keep taking his medications.  He is convinced that they make him feel fatigued and tired.    We discussed the option of catheter ablation in detail.  I reviewed the specific risk-benefit profile the procedure.  I quoted the patient a 1 to 2% chance of significant procedure complication including but not limited to bleeding at the groin, cardiac perforation, tamponade, stroke, myocardial infarction, death phrenic nerve injury, pulmonary vein stenosis, catheter entrapment of the mitral valve annulus, esophageal injury as well as fistula and other potential complications.    His most recent cardioversion was after he had been in atrial fibrillation from September through November.  Lasted 3 days.  He has been on flecainide since then and has had continued atrial fibrillation.    The longest episode of A-fib has been several months and certainly less than 12 months however.    He wishes to proceed and to be scheduled for an A-fib ablation.    The patient  and I made a mutually shared decision ( shared decision making model ) that the patient would be best served by proceeding with the above invasive heart procedure.  This is a high risk invasive cardiac procedure which comes with significant risk of morbidity and mortality.  This patient has significant underlying  heart disease.  Ángel Parikh understands these risks and   has made an informed decision to proceed.    PFA      2. Chronic anticoagulation  Lifelong anticoagulation recommended.  [id number] significantly elevated.      3. Long term current use of antiarrhythmic drug  Flecainide.  Ineffective.  Breakthrough symptoms.  Intolerance as well.      4. MAE on CPAP  100% CPAP compliant.  Good for him.        Body mass index is 34.46 kg/m².    I spent 48 minutes in consultation with this patient which included more than 65% of this time in direct face-to-face counseling, physical examination and discussion of my assessment and findings and this shared decision making with the patient.  The remainder of the time not spent face-to-face was performing one, some or all of the following actions: preparing to see the patient (e.g. reviewing tests, prior clinicians' notes, etc), ordering medications, tests or procedures, coordination of care, discussion of the plan with other healthcare providers, documenting clinical information in epic as well as independently interpreting results and communication of these results to the patient family and/or caregiver(s).  Please note that this explicitly excludes time spent on other separate billable services such as performing procedures or test interpretation, when applicable.    Follow Up:       Thank you for allowing me to participate in the care of your patient. Please to not hesitate to contact me with additional questions or concerns.      Luis Atwood, DO, FACC, RS  Cardiac Electrophysiologist  [facility] / [facility]

## 2024-01-18 ENCOUNTER — TELEPHONE (OUTPATIENT)
Dept: CARDIOLOGY | Facility: CLINIC | Age: 79
End: 2024-01-18
Payer: MEDICARE

## 2024-01-18 NOTE — TELEPHONE ENCOUNTER
Spoke with patient regarding enrollment after Ines saw in office 1/15/24 & provided consent.  All questions answered and he is agreeable to move forward with enrollment.  He will need an echo at PAT day. He is unable to have PVA on 1/30/24. Will work with scheduling for possible PVA on 1/31/24.

## 2024-01-22 DIAGNOSIS — I48.19 ATRIAL FIBRILLATION, PERSISTENT: Primary | ICD-10-CM

## 2024-01-23 DIAGNOSIS — I48.19 ATRIAL FIBRILLATION, PERSISTENT: Primary | ICD-10-CM

## 2024-01-23 RX ORDER — ONDANSETRON 2 MG/ML
4 INJECTION INTRAMUSCULAR; INTRAVENOUS EVERY 6 HOURS PRN
OUTPATIENT
Start: 2024-01-23

## 2024-01-23 RX ORDER — ACETAMINOPHEN 325 MG/1
650 TABLET ORAL EVERY 4 HOURS PRN
OUTPATIENT
Start: 2024-01-23

## 2024-01-23 RX ORDER — SODIUM CHLORIDE 9 MG/ML
40 INJECTION, SOLUTION INTRAVENOUS AS NEEDED
OUTPATIENT
Start: 2024-01-23

## 2024-01-23 RX ORDER — NITROGLYCERIN 0.4 MG/1
0.4 TABLET SUBLINGUAL
OUTPATIENT
Start: 2024-01-23

## 2024-01-25 DIAGNOSIS — I48.19 ATRIAL FIBRILLATION, PERSISTENT: Primary | ICD-10-CM

## 2024-01-26 ENCOUNTER — HOSPITAL ENCOUNTER (OUTPATIENT)
Dept: CT IMAGING | Facility: HOSPITAL | Age: 79
Discharge: HOME | End: 2024-01-26
Payer: MEDICARE

## 2024-01-26 ENCOUNTER — DOCUMENTATION (OUTPATIENT)
Dept: CARDIOLOGY | Facility: CLINIC | Age: 79
End: 2024-01-26
Payer: MEDICARE

## 2024-01-26 ENCOUNTER — PRE-ADMISSION TESTING (OUTPATIENT)
Dept: PREADMISSION TESTING | Facility: HOSPITAL | Age: 79
End: 2024-01-26
Payer: MEDICARE

## 2024-01-26 ENCOUNTER — HOSPITAL ENCOUNTER (OUTPATIENT)
Dept: CARDIOLOGY | Facility: HOSPITAL | Age: 79
Discharge: HOME | End: 2024-01-26
Payer: MEDICARE

## 2024-01-26 DIAGNOSIS — I48.19 ATRIAL FIBRILLATION, PERSISTENT: ICD-10-CM

## 2024-01-26 LAB
ANION GAP SERPL CALCULATED.3IONS-SCNC: 7 MMOL/L (ref 5–15)
BH CV ECHO MEAS - AO MAX PG: 9.5 MMHG
BH CV ECHO MEAS - AO MEAN PG: 5 MMHG
BH CV ECHO MEAS - AO ROOT DIAM: 3.2 CM
BH CV ECHO MEAS - AO V2 MAX: 154 CM/SEC
BH CV ECHO MEAS - AO V2 VTI: 23.6 CM
BH CV ECHO MEAS - AVA(I,D): 1.58 CM2
BH CV ECHO MEAS - EDV(CUBED): 85.2 ML
BH CV ECHO MEAS - EDV(MOD-SP2): 74.6 ML
BH CV ECHO MEAS - EDV(MOD-SP4): 93.8 ML
BH CV ECHO MEAS - EF(MOD-BP): 67.9 %
BH CV ECHO MEAS - EF(MOD-SP2): 71.4 %
BH CV ECHO MEAS - EF(MOD-SP4): 69.1 %
BH CV ECHO MEAS - ESV(CUBED): 17.6 ML
BH CV ECHO MEAS - ESV(MOD-SP2): 21.3 ML
BH CV ECHO MEAS - ESV(MOD-SP4): 29 ML
BH CV ECHO MEAS - FS: 40.9 %
BH CV ECHO MEAS - IVS/LVPW: 1 CM
BH CV ECHO MEAS - IVSD: 1 CM
BH CV ECHO MEAS - LA DIMENSION: 4.5 CM
BH CV ECHO MEAS - LAT PEAK E' VEL: 16.5 CM/SEC
BH CV ECHO MEAS - LV DIASTOLIC VOL/BSA (35-75): 44 CM2
BH CV ECHO MEAS - LV MASS(C)D: 147.8 GRAMS
BH CV ECHO MEAS - LV MAX PG: 1.78 MMHG
BH CV ECHO MEAS - LV MEAN PG: 1 MMHG
BH CV ECHO MEAS - LV SYSTOLIC VOL/BSA (12-30): 13.6 CM2
BH CV ECHO MEAS - LV V1 MAX: 66.7 CM/SEC
BH CV ECHO MEAS - LV V1 VTI: 11.9 CM
BH CV ECHO MEAS - LVIDD: 4.4 CM
BH CV ECHO MEAS - LVIDS: 2.6 CM
BH CV ECHO MEAS - LVOT AREA: 3.1 CM2
BH CV ECHO MEAS - LVOT DIAM: 2 CM
BH CV ECHO MEAS - LVPWD: 1 CM
BH CV ECHO MEAS - MED PEAK E' VEL: 9.9 CM/SEC
BH CV ECHO MEAS - MV DEC SLOPE: 692 CM/SEC2
BH CV ECHO MEAS - MV DEC TIME: 0.1 SEC
BH CV ECHO MEAS - MV E MAX VEL: 105 CM/SEC
BH CV ECHO MEAS - MV MAX PG: 6.3 MMHG
BH CV ECHO MEAS - MV MEAN PG: 3 MMHG
BH CV ECHO MEAS - MV P1/2T: 52.9 MSEC
BH CV ECHO MEAS - MV V2 VTI: 21.4 CM
BH CV ECHO MEAS - MVA(P1/2T): 4.2 CM2
BH CV ECHO MEAS - MVA(VTI): 1.75 CM2
BH CV ECHO MEAS - PA ACC TIME: 0.08 SEC
BH CV ECHO MEAS - RAP SYSTOLE: 3 MMHG
BH CV ECHO MEAS - RVSP: 42.4 MMHG
BH CV ECHO MEAS - SI(MOD-SP2): 25 ML/M2
BH CV ECHO MEAS - SI(MOD-SP4): 30.4 ML/M2
BH CV ECHO MEAS - SV(LVOT): 37.4 ML
BH CV ECHO MEAS - SV(MOD-SP2): 53.3 ML
BH CV ECHO MEAS - SV(MOD-SP4): 64.8 ML
BH CV ECHO MEAS - TAPSE (>1.6): 1.93 CM
BH CV ECHO MEAS - TR MAX PG: 39.4 MMHG
BH CV ECHO MEAS - TR MAX VEL: 314 CM/SEC
BH CV ECHO MEASUREMENTS AVERAGE E/E' RATIO: 7.95
BH CV XLRA - RV BASE: 3.7 CM
BH CV XLRA - RV LENGTH: 5.9 CM
BH CV XLRA - RV MID: 2.7 CM
BH CV XLRA - TDI S': 13.3 CM/SEC
BUN SERPL-MCNC: 19 MG/DL (ref 8–23)
BUN/CREAT SERPL: 22.1 (ref 7–25)
CALCIUM SPEC-SCNC: 9 MG/DL (ref 8.6–10.5)
CHLORIDE SERPL-SCNC: 108 MMOL/L (ref 98–107)
CO2 SERPL-SCNC: 27 MMOL/L (ref 22–29)
CREAT SERPL-MCNC: 0.86 MG/DL (ref 0.76–1.27)
DEPRECATED RDW RBC AUTO: 48.7 FL (ref 37–54)
EGFRCR SERPLBLD CKD-EPI 2021: 89.2 ML/MIN/1.73
ERYTHROCYTE [DISTWIDTH] IN BLOOD BY AUTOMATED COUNT: 14.6 % (ref 12.3–15.4)
GLUCOSE SERPL-MCNC: 115 MG/DL (ref 65–99)
HCT VFR BLD AUTO: 42.5 % (ref 37.5–51)
HGB BLD-MCNC: 13.8 G/DL (ref 13–17.7)
LEFT ATRIUM VOLUME INDEX: 34.5 ML/M2
MCH RBC QN AUTO: 30.1 PG (ref 26.6–33)
MCHC RBC AUTO-ENTMCNC: 32.5 G/DL (ref 31.5–35.7)
MCV RBC AUTO: 92.6 FL (ref 79–97)
PLATELET # BLD AUTO: 218 10*3/MM3 (ref 140–450)
PMV BLD AUTO: 10.1 FL (ref 6–12)
POTASSIUM SERPL-SCNC: 4.3 MMOL/L (ref 3.5–5.2)
RBC # BLD AUTO: 4.59 10*6/MM3 (ref 4.14–5.8)
SODIUM SERPL-SCNC: 142 MMOL/L (ref 136–145)
WBC NRBC COR # BLD AUTO: 6.44 10*3/MM3 (ref 3.4–10.8)

## 2024-01-26 PROCEDURE — 71275 CT ANGIOGRAPHY CHEST: CPT

## 2024-01-26 PROCEDURE — 93306 TTE W/DOPPLER COMPLETE: CPT

## 2024-01-26 PROCEDURE — 80048 BASIC METABOLIC PNL TOTAL CA: CPT

## 2024-01-26 PROCEDURE — 85027 COMPLETE CBC AUTOMATED: CPT

## 2024-01-26 PROCEDURE — 36415 COLL VENOUS BLD VENIPUNCTURE: CPT

## 2024-01-26 PROCEDURE — 25510000001 IOPAMIDOL PER 1 ML: Performed by: INTERNAL MEDICINE

## 2024-01-26 RX ADMIN — IOPAMIDOL 80 ML: 755 INJECTION, SOLUTION INTRAVENOUS at 16:29

## 2024-01-26 NOTE — PAT
An arrival time for procedure was not provided during PAT visit. If patient had any questions or concerns about their arrival time, they were instructed to contact their surgeon/physician.  Additionally, if the patient referred to an arrival time that was acquired from their my chart account, patient was encouraged to verify that time with their surgeon/physician. Arrival times are NOT provided in Pre Admission Testing Department.    Research saw pt prior to leaving PAT dept.    Pt given directions to echo and CT scan after PAT visit.

## 2024-01-26 NOTE — RESEARCH
Informed consent worksheet for the protocol:  Advantage AF Phase II    Consent version  08-  with the approval dated  Nov, 2023    Subject ID  [id number]      The patient was seen in the office previously by Dr. Atwood and identified as a candidate for the  Advantage AF phase II study .     The following people were present at the consent conference:   Patient: Ángel Parikh   : Chencho Zhang   Other:  patient's wife      The following was discussed with the patient prior to signing the informed consent.    The study purposes   Procedures   Duration of study participation  New findings   Risks   Discomforts  Any known side effects when applicable    The alternative treatments   Benefits   Patient and insurance costs   Payments if applicable     Patient's accountability and responsibilities    The voluntary nature of research participants     Right to withdraw consent    The withdrawal process    Confidentiality   Authorization to use and disclose information for research purposes - Including who can view information and the types of information shared.    The patient was informed of what to do in case of an illness or injury resulting from the study and who to contact for more trial information, or information on rights and welfare as a research volunteer. The patient was given the  contact Information and clinical trial contact information.     The patient was given opportunity for questions and opportunity to discuss this with family and friends. The  and or sub investigators were also available for any questions. The patient verbalizes understanding and is willing to sign the informed consent.     After all questions were satisfied the patient signed the informed consent and a copy of the signed main informed consent form & any sub-study informed consent forms were given to the patient.    No study-specific procedures were completed prior to  patient signing study informed consent form(s)    The  and or sub investigator is aware of the subject's participation status.

## 2024-01-26 NOTE — DISCHARGE INSTRUCTIONS
Dear Patient,    Do NOT eat, drink, or smoke after midnight the night before your procedure.   Take your medications as instructed by your doctor.    Glasses and jewelry may be worn, but dentures must be removed prior to your procedure.    Leave any items you consider valuable at home.      MORNING of your Procedure, please bring the following:     -Photo ID and insurance card(s)    -ALL medications in their ORIGINAL CONTAINERS or current medication list.    -Co-pay and/or deductible required by your insurance   -Copy of living will or power of  document (if not brought to Pre-Admission Testing department)   -CPAP mask and tubing, not your machine (if applicable)   -Relaxation aids (music, books, magazines)   -Skin Prep Instruction Sheet (if applicable)       Check in is on the 2nd floor of the [facility].  Your procedure will be performed in the cath lab or EP lab.  During your procedure, your family will wait in the cath lab waiting area where you checked in.      Need to make arrangements for transportation prior to discharge.    Educational handout related to procedure was given in Pre-Admission testing.  The educational handout is for informational purposes only.  If you have any questions about your procedure, please speak with your physician.      Please note:  If you are scheduled to have one of the following procedures: Pulmonary Vein Ablation, Lead Extraction, MitraClip, Cerebral Coilings or Embolization, please let your family know that after your procedure you will be going to recovery unit on the 2nd floor of the [facility].  When the physician is finished speaking with your family after your procedure is completed, your family will be directed or escorted to the surgery waiting area in the [facility].  This is where your family will wait until you are given a room assignment and then your family will be directed to the appropriate unit.

## 2024-01-31 ENCOUNTER — DOCUMENTATION (OUTPATIENT)
Dept: CARDIOLOGY | Facility: CLINIC | Age: 79
End: 2024-01-31
Payer: MEDICARE

## 2024-01-31 ENCOUNTER — ANESTHESIA (OUTPATIENT)
Dept: CARDIOLOGY | Facility: HOSPITAL | Age: 79
End: 2024-01-31
Payer: MEDICARE

## 2024-01-31 ENCOUNTER — ANESTHESIA EVENT (OUTPATIENT)
Dept: CARDIOLOGY | Facility: HOSPITAL | Age: 79
End: 2024-01-31
Payer: MEDICARE

## 2024-01-31 ENCOUNTER — HOSPITAL ENCOUNTER (OUTPATIENT)
Facility: HOSPITAL | Age: 79
Discharge: HOME | End: 2024-01-31
Attending: INTERNAL MEDICINE | Admitting: INTERNAL MEDICINE
Payer: MEDICARE

## 2024-01-31 VITALS
TEMPERATURE: 97.1 F | OXYGEN SATURATION: 96 % | HEIGHT: 66 IN | HEART RATE: 62 BPM | DIASTOLIC BLOOD PRESSURE: 91 MMHG | SYSTOLIC BLOOD PRESSURE: 111 MMHG | WEIGHT: 221 LBS | RESPIRATION RATE: 12 BRPM | BODY MASS INDEX: 35.52 KG/M2

## 2024-01-31 DIAGNOSIS — I48.19 ATRIAL FIBRILLATION, PERSISTENT: ICD-10-CM

## 2024-01-31 LAB
ACT BLD: 390 SECONDS (ref 82–152)
ACT BLD: 395 SECONDS (ref 82–152)
ACT BLD: 439 SECONDS (ref 82–152)
ANION GAP SERPL CALCULATED.3IONS-SCNC: 12 MMOL/L (ref 5–15)
BUN SERPL-MCNC: 19 MG/DL (ref 8–23)
BUN/CREAT SERPL: 21.8 (ref 7–25)
CALCIUM SPEC-SCNC: 8.8 MG/DL (ref 8.6–10.5)
CHLORIDE SERPL-SCNC: 106 MMOL/L (ref 98–107)
CO2 SERPL-SCNC: 23 MMOL/L (ref 22–29)
CREAT SERPL-MCNC: 0.87 MG/DL (ref 0.76–1.27)
DEPRECATED RDW RBC AUTO: 49.6 FL (ref 37–54)
EGFRCR SERPLBLD CKD-EPI 2021: 88.9 ML/MIN/1.73
ERYTHROCYTE [DISTWIDTH] IN BLOOD BY AUTOMATED COUNT: 14.8 % (ref 12.3–15.4)
GLUCOSE BLDC GLUCOMTR-MCNC: 102 MG/DL (ref 70–130)
GLUCOSE SERPL-MCNC: 146 MG/DL (ref 65–99)
HCT VFR BLD AUTO: 37.8 % (ref 37.5–51)
HGB BLD-MCNC: 12.4 G/DL (ref 13–17.7)
MCH RBC QN AUTO: 30.2 PG (ref 26.6–33)
MCHC RBC AUTO-ENTMCNC: 32.8 G/DL (ref 31.5–35.7)
MCV RBC AUTO: 92 FL (ref 79–97)
PLATELET # BLD AUTO: 188 10*3/MM3 (ref 140–450)
PMV BLD AUTO: 10.1 FL (ref 6–12)
POTASSIUM SERPL-SCNC: 4.2 MMOL/L (ref 3.5–5.2)
RBC # BLD AUTO: 4.11 10*6/MM3 (ref 4.14–5.8)
SODIUM SERPL-SCNC: 141 MMOL/L (ref 136–145)
WBC NRBC COR # BLD AUTO: 8.6 10*3/MM3 (ref 3.4–10.8)

## 2024-01-31 PROCEDURE — 25010000002 ADENOSINE PER 6 MG: Performed by: INTERNAL MEDICINE

## 2024-01-31 PROCEDURE — 93005 ELECTROCARDIOGRAM TRACING: CPT | Performed by: ANESTHESIOLOGY

## 2024-01-31 PROCEDURE — C1764 EVENT RECORDER, CARDIAC: HCPCS | Performed by: INTERNAL MEDICINE

## 2024-01-31 PROCEDURE — 85347 COAGULATION TIME ACTIVATED: CPT

## 2024-01-31 PROCEDURE — 93005 ELECTROCARDIOGRAM TRACING: CPT | Performed by: INTERNAL MEDICINE

## 2024-01-31 PROCEDURE — 80048 BASIC METABOLIC PNL TOTAL CA: CPT | Performed by: INTERNAL MEDICINE

## 2024-01-31 PROCEDURE — 82948 REAGENT STRIP/BLOOD GLUCOSE: CPT

## 2024-01-31 PROCEDURE — 93657 TX L/R ATRIAL FIB ADDL: CPT | Performed by: INTERNAL MEDICINE

## 2024-01-31 PROCEDURE — C1889 IMPLANT/INSERT DEVICE, NOC: HCPCS | Performed by: INTERNAL MEDICINE

## 2024-01-31 PROCEDURE — 25010000002 PROPOFOL 10 MG/ML EMULSION: Performed by: NURSE ANESTHETIST, CERTIFIED REGISTERED

## 2024-01-31 PROCEDURE — 93622 COMP EP EVAL L VENTR PAC&REC: CPT | Performed by: INTERNAL MEDICINE

## 2024-01-31 PROCEDURE — 33285 INSJ SUBQ CAR RHYTHM MNTR: CPT | Performed by: INTERNAL MEDICINE

## 2024-01-31 PROCEDURE — 25810000003 SODIUM CHLORIDE 0.9 % SOLUTION 250 ML FLEX CONT: Performed by: NURSE ANESTHETIST, CERTIFIED REGISTERED

## 2024-01-31 PROCEDURE — 25010000002 PROTAMINE SULFATE PER 10 MG: Performed by: INTERNAL MEDICINE

## 2024-01-31 PROCEDURE — C1759 CATH, INTRA ECHOCARDIOGRAPHY: HCPCS | Performed by: INTERNAL MEDICINE

## 2024-01-31 PROCEDURE — 25010000002 DEXAMETHASONE PER 1 MG: Performed by: NURSE ANESTHETIST, CERTIFIED REGISTERED

## 2024-01-31 PROCEDURE — 25010000002 GLYCOPYRROLATE 0.4 MG/2ML SOLUTION: Performed by: NURSE ANESTHETIST, CERTIFIED REGISTERED

## 2024-01-31 PROCEDURE — 85027 COMPLETE CBC AUTOMATED: CPT | Performed by: INTERNAL MEDICINE

## 2024-01-31 PROCEDURE — 25010000002 ONDANSETRON PER 1 MG: Performed by: NURSE ANESTHETIST, CERTIFIED REGISTERED

## 2024-01-31 PROCEDURE — C1732 CATH, EP, DIAG/ABL, 3D/VECT: HCPCS | Performed by: INTERNAL MEDICINE

## 2024-01-31 PROCEDURE — C1766 INTRO/SHEATH,STRBLE,NON-PEEL: HCPCS | Performed by: INTERNAL MEDICINE

## 2024-01-31 PROCEDURE — C1893 INTRO/SHEATH, FIXED,NON-PEEL: HCPCS | Performed by: INTERNAL MEDICINE

## 2024-01-31 PROCEDURE — 25810000003 SODIUM CHLORIDE 0.9 % SOLUTION: Performed by: NURSE ANESTHETIST, CERTIFIED REGISTERED

## 2024-01-31 PROCEDURE — 25010000002 NITROGLYCERIN 200 MCG/ML SOLUTION: Performed by: INTERNAL MEDICINE

## 2024-01-31 PROCEDURE — 93623 PRGRMD STIMJ&PACG IV RX NFS: CPT | Performed by: INTERNAL MEDICINE

## 2024-01-31 PROCEDURE — 93655 ICAR CATH ABLTJ DSCRT ARRHYT: CPT | Performed by: INTERNAL MEDICINE

## 2024-01-31 PROCEDURE — 25010000002 BUPIVACAINE 0.5 % SOLUTION: Performed by: INTERNAL MEDICINE

## 2024-01-31 PROCEDURE — C1894 INTRO/SHEATH, NON-LASER: HCPCS | Performed by: INTERNAL MEDICINE

## 2024-01-31 PROCEDURE — 93656 COMPRE EP EVAL ABLTJ ATR FIB: CPT | Performed by: INTERNAL MEDICINE

## 2024-01-31 PROCEDURE — 25010000002 HEPARIN (PORCINE) PER 1000 UNITS: Performed by: INTERNAL MEDICINE

## 2024-01-31 PROCEDURE — 25010000002 NEOSTIGMINE 10 MG/10ML SOLUTION: Performed by: NURSE ANESTHETIST, CERTIFIED REGISTERED

## 2024-01-31 PROCEDURE — C1769 GUIDE WIRE: HCPCS | Performed by: INTERNAL MEDICINE

## 2024-01-31 PROCEDURE — C1760 CLOSURE DEV, VASC: HCPCS | Performed by: INTERNAL MEDICINE

## 2024-01-31 PROCEDURE — 25010000002 LIDOCAINE 1 % SOLUTION: Performed by: INTERNAL MEDICINE

## 2024-01-31 PROCEDURE — 25010000002 PHENYLEPHRINE 10 MG/ML SOLUTION 1 ML VIAL: Performed by: NURSE ANESTHETIST, CERTIFIED REGISTERED

## 2024-01-31 PROCEDURE — C1730 CATH, EP, 19 OR FEW ELECT: HCPCS | Performed by: INTERNAL MEDICINE

## 2024-01-31 PROCEDURE — 25810000003 SODIUM CHLORIDE 0.9 % SOLUTION: Performed by: INTERNAL MEDICINE

## 2024-01-31 PROCEDURE — 25010000002 FENTANYL CITRATE (PF) 50 MCG/ML SOLUTION: Performed by: NURSE ANESTHETIST, CERTIFIED REGISTERED

## 2024-01-31 DEVICE — LUX-DX™ INSERTABLE CARDIAC MONITOR
Type: IMPLANTABLE DEVICE | Status: FUNCTIONAL
Brand: LUX-DX™ INSERTABLE CARDIAC MONITOR

## 2024-01-31 RX ORDER — DROPERIDOL 2.5 MG/ML
0.62 INJECTION, SOLUTION INTRAMUSCULAR; INTRAVENOUS
Status: DISCONTINUED | OUTPATIENT
Start: 2024-01-31 | End: 2024-01-31 | Stop reason: HOSPADM

## 2024-01-31 RX ORDER — BUPIVACAINE HCL/0.9 % NACL/PF 0.125 %
PLASTIC BAG, INJECTION (ML) EPIDURAL AS NEEDED
Status: DISCONTINUED | OUTPATIENT
Start: 2024-01-31 | End: 2024-01-31 | Stop reason: SURG

## 2024-01-31 RX ORDER — ACETAMINOPHEN 325 MG/1
650 TABLET ORAL EVERY 4 HOURS PRN
Status: DISCONTINUED | OUTPATIENT
Start: 2024-01-31 | End: 2024-01-31 | Stop reason: HOSPADM

## 2024-01-31 RX ORDER — SODIUM CHLORIDE 9 MG/ML
40 INJECTION, SOLUTION INTRAVENOUS AS NEEDED
Status: DISCONTINUED | OUTPATIENT
Start: 2024-01-31 | End: 2024-01-31 | Stop reason: HOSPADM

## 2024-01-31 RX ORDER — PROPOFOL 10 MG/ML
VIAL (ML) INTRAVENOUS AS NEEDED
Status: DISCONTINUED | OUTPATIENT
Start: 2024-01-31 | End: 2024-01-31 | Stop reason: SURG

## 2024-01-31 RX ORDER — LIDOCAINE HYDROCHLORIDE 10 MG/ML
INJECTION, SOLUTION INFILTRATION; PERINEURAL
Status: DISCONTINUED | OUTPATIENT
Start: 2024-01-31 | End: 2024-01-31 | Stop reason: HOSPADM

## 2024-01-31 RX ORDER — HYDROMORPHONE HYDROCHLORIDE 1 MG/ML
0.5 INJECTION, SOLUTION INTRAMUSCULAR; INTRAVENOUS; SUBCUTANEOUS
Status: DISCONTINUED | OUTPATIENT
Start: 2024-01-31 | End: 2024-01-31 | Stop reason: SDUPTHER

## 2024-01-31 RX ORDER — NITROGLYCERIN 20 MG/100ML
INJECTION INTRAVENOUS
Status: COMPLETED | OUTPATIENT
Start: 2024-01-31 | End: 2024-01-31

## 2024-01-31 RX ORDER — METOPROLOL TARTRATE 100 MG/1
100 TABLET ORAL 2 TIMES DAILY
Status: DISCONTINUED | OUTPATIENT
Start: 2024-01-31 | End: 2024-01-31 | Stop reason: HOSPADM

## 2024-01-31 RX ORDER — BUPIVACAINE HYDROCHLORIDE 5 MG/ML
INJECTION, SOLUTION PERINEURAL
Status: DISCONTINUED | OUTPATIENT
Start: 2024-01-31 | End: 2024-01-31 | Stop reason: HOSPADM

## 2024-01-31 RX ORDER — HEPARIN SODIUM 1000 [USP'U]/ML
INJECTION, SOLUTION INTRAVENOUS; SUBCUTANEOUS
Status: DISCONTINUED | OUTPATIENT
Start: 2024-01-31 | End: 2024-01-31 | Stop reason: HOSPADM

## 2024-01-31 RX ORDER — PROTAMINE SULFATE 10 MG/ML
INJECTION, SOLUTION INTRAVENOUS
Status: DISCONTINUED | OUTPATIENT
Start: 2024-01-31 | End: 2024-01-31 | Stop reason: HOSPADM

## 2024-01-31 RX ORDER — PROMETHAZINE HYDROCHLORIDE 25 MG/1
25 SUPPOSITORY RECTAL ONCE AS NEEDED
Status: DISCONTINUED | OUTPATIENT
Start: 2024-01-31 | End: 2024-01-31 | Stop reason: HOSPADM

## 2024-01-31 RX ORDER — FENTANYL CITRATE 50 UG/ML
50 INJECTION, SOLUTION INTRAMUSCULAR; INTRAVENOUS
Status: DISCONTINUED | OUTPATIENT
Start: 2024-01-31 | End: 2024-01-31 | Stop reason: SDUPTHER

## 2024-01-31 RX ORDER — ADENOSINE 3 MG/ML
INJECTION, SOLUTION INTRAVENOUS
Status: DISCONTINUED | OUTPATIENT
Start: 2024-01-31 | End: 2024-01-31 | Stop reason: HOSPADM

## 2024-01-31 RX ORDER — PANTOPRAZOLE SODIUM 40 MG/1
40 TABLET, DELAYED RELEASE ORAL DAILY
Status: DISCONTINUED | OUTPATIENT
Start: 2024-01-31 | End: 2024-01-31 | Stop reason: HOSPADM

## 2024-01-31 RX ORDER — SODIUM CHLORIDE 0.9 % (FLUSH) 0.9 %
3-10 SYRINGE (ML) INJECTION AS NEEDED
Status: DISCONTINUED | OUTPATIENT
Start: 2024-01-31 | End: 2024-01-31 | Stop reason: HOSPADM

## 2024-01-31 RX ORDER — SODIUM CHLORIDE, SODIUM LACTATE, POTASSIUM CHLORIDE, CALCIUM CHLORIDE 600; 310; 30; 20 MG/100ML; MG/100ML; MG/100ML; MG/100ML
9 INJECTION, SOLUTION INTRAVENOUS CONTINUOUS
Status: DISCONTINUED | OUTPATIENT
Start: 2024-01-31 | End: 2024-01-31 | Stop reason: HOSPADM

## 2024-01-31 RX ORDER — NALOXONE HCL 0.4 MG/ML
0.4 VIAL (ML) INJECTION AS NEEDED
Status: DISCONTINUED | OUTPATIENT
Start: 2024-01-31 | End: 2024-01-31 | Stop reason: HOSPADM

## 2024-01-31 RX ORDER — ROCURONIUM BROMIDE 10 MG/ML
INJECTION, SOLUTION INTRAVENOUS AS NEEDED
Status: DISCONTINUED | OUTPATIENT
Start: 2024-01-31 | End: 2024-01-31 | Stop reason: SURG

## 2024-01-31 RX ORDER — FAMOTIDINE 20 MG/1
20 TABLET, FILM COATED ORAL ONCE
Status: DISCONTINUED | OUTPATIENT
Start: 2024-01-31 | End: 2024-01-31 | Stop reason: HOSPADM

## 2024-01-31 RX ORDER — DEXAMETHASONE SODIUM PHOSPHATE 4 MG/ML
INJECTION, SOLUTION INTRA-ARTICULAR; INTRALESIONAL; INTRAMUSCULAR; INTRAVENOUS; SOFT TISSUE AS NEEDED
Status: DISCONTINUED | OUTPATIENT
Start: 2024-01-31 | End: 2024-01-31 | Stop reason: SURG

## 2024-01-31 RX ORDER — DROPERIDOL 2.5 MG/ML
0.62 INJECTION, SOLUTION INTRAMUSCULAR; INTRAVENOUS ONCE AS NEEDED
Status: DISCONTINUED | OUTPATIENT
Start: 2024-01-31 | End: 2024-01-31 | Stop reason: HOSPADM

## 2024-01-31 RX ORDER — SODIUM CHLORIDE 9 MG/ML
INJECTION, SOLUTION INTRAVENOUS
Status: COMPLETED | OUTPATIENT
Start: 2024-01-31 | End: 2024-01-31

## 2024-01-31 RX ORDER — NITROGLYCERIN 0.4 MG/1
0.4 TABLET SUBLINGUAL
Status: DISCONTINUED | OUTPATIENT
Start: 2024-01-31 | End: 2024-01-31 | Stop reason: HOSPADM

## 2024-01-31 RX ORDER — HYDRALAZINE HYDROCHLORIDE 20 MG/ML
5 INJECTION INTRAMUSCULAR; INTRAVENOUS
Status: DISCONTINUED | OUTPATIENT
Start: 2024-01-31 | End: 2024-01-31 | Stop reason: HOSPADM

## 2024-01-31 RX ORDER — LIDOCAINE HYDROCHLORIDE 10 MG/ML
0.5 INJECTION, SOLUTION EPIDURAL; INFILTRATION; INTRACAUDAL; PERINEURAL ONCE AS NEEDED
Status: DISCONTINUED | OUTPATIENT
Start: 2024-01-31 | End: 2024-01-31 | Stop reason: HOSPADM

## 2024-01-31 RX ORDER — SODIUM CHLORIDE 9 MG/ML
INJECTION, SOLUTION INTRAVENOUS CONTINUOUS PRN
Status: DISCONTINUED | OUTPATIENT
Start: 2024-01-31 | End: 2024-01-31 | Stop reason: SURG

## 2024-01-31 RX ORDER — NEOSTIGMINE METHYLSULFATE 1 MG/ML
INJECTION, SOLUTION INTRAVENOUS AS NEEDED
Status: DISCONTINUED | OUTPATIENT
Start: 2024-01-31 | End: 2024-01-31 | Stop reason: SURG

## 2024-01-31 RX ORDER — HYDROMORPHONE HYDROCHLORIDE 1 MG/ML
0.5 INJECTION, SOLUTION INTRAMUSCULAR; INTRAVENOUS; SUBCUTANEOUS
Status: DISCONTINUED | OUTPATIENT
Start: 2024-01-31 | End: 2024-01-31 | Stop reason: HOSPADM

## 2024-01-31 RX ORDER — GLYCOPYRROLATE 0.2 MG/ML
INJECTION INTRAMUSCULAR; INTRAVENOUS AS NEEDED
Status: DISCONTINUED | OUTPATIENT
Start: 2024-01-31 | End: 2024-01-31 | Stop reason: SURG

## 2024-01-31 RX ORDER — FAMOTIDINE 10 MG/ML
20 INJECTION, SOLUTION INTRAVENOUS ONCE
Status: COMPLETED | OUTPATIENT
Start: 2024-01-31 | End: 2024-01-31

## 2024-01-31 RX ORDER — FENTANYL CITRATE 50 UG/ML
50 INJECTION, SOLUTION INTRAMUSCULAR; INTRAVENOUS
Status: DISCONTINUED | OUTPATIENT
Start: 2024-01-31 | End: 2024-01-31 | Stop reason: HOSPADM

## 2024-01-31 RX ORDER — IPRATROPIUM BROMIDE AND ALBUTEROL SULFATE 2.5; .5 MG/3ML; MG/3ML
3 SOLUTION RESPIRATORY (INHALATION) ONCE AS NEEDED
Status: DISCONTINUED | OUTPATIENT
Start: 2024-01-31 | End: 2024-01-31 | Stop reason: HOSPADM

## 2024-01-31 RX ORDER — LABETALOL HYDROCHLORIDE 5 MG/ML
5 INJECTION, SOLUTION INTRAVENOUS
Status: DISCONTINUED | OUTPATIENT
Start: 2024-01-31 | End: 2024-01-31 | Stop reason: HOSPADM

## 2024-01-31 RX ORDER — PROMETHAZINE HYDROCHLORIDE 25 MG/1
25 TABLET ORAL ONCE AS NEEDED
Status: DISCONTINUED | OUTPATIENT
Start: 2024-01-31 | End: 2024-01-31 | Stop reason: HOSPADM

## 2024-01-31 RX ORDER — ONDANSETRON 2 MG/ML
4 INJECTION INTRAMUSCULAR; INTRAVENOUS EVERY 6 HOURS PRN
Status: DISCONTINUED | OUTPATIENT
Start: 2024-01-31 | End: 2024-01-31 | Stop reason: HOSPADM

## 2024-01-31 RX ORDER — ONDANSETRON 2 MG/ML
INJECTION INTRAMUSCULAR; INTRAVENOUS AS NEEDED
Status: DISCONTINUED | OUTPATIENT
Start: 2024-01-31 | End: 2024-01-31 | Stop reason: SURG

## 2024-01-31 RX ORDER — HEPARIN SODIUM 10000 [USP'U]/100ML
INJECTION, SOLUTION INTRAVENOUS
Status: DISCONTINUED | OUTPATIENT
Start: 2024-01-31 | End: 2024-01-31 | Stop reason: HOSPADM

## 2024-01-31 RX ORDER — FENTANYL CITRATE 50 UG/ML
INJECTION, SOLUTION INTRAMUSCULAR; INTRAVENOUS AS NEEDED
Status: DISCONTINUED | OUTPATIENT
Start: 2024-01-31 | End: 2024-01-31 | Stop reason: SURG

## 2024-01-31 RX ORDER — SODIUM CHLORIDE 0.9 % (FLUSH) 0.9 %
10 SYRINGE (ML) INJECTION AS NEEDED
Status: DISCONTINUED | OUTPATIENT
Start: 2024-01-31 | End: 2024-01-31 | Stop reason: HOSPADM

## 2024-01-31 RX ORDER — MIDAZOLAM HYDROCHLORIDE 1 MG/ML
0.5 INJECTION INTRAMUSCULAR; INTRAVENOUS
Status: DISCONTINUED | OUTPATIENT
Start: 2024-01-31 | End: 2024-01-31 | Stop reason: HOSPADM

## 2024-01-31 RX ORDER — ONDANSETRON 2 MG/ML
4 INJECTION INTRAMUSCULAR; INTRAVENOUS ONCE AS NEEDED
Status: DISCONTINUED | OUTPATIENT
Start: 2024-01-31 | End: 2024-01-31 | Stop reason: HOSPADM

## 2024-01-31 RX ORDER — SODIUM CHLORIDE 0.9 % (FLUSH) 0.9 %
3 SYRINGE (ML) INJECTION EVERY 12 HOURS SCHEDULED
Status: DISCONTINUED | OUTPATIENT
Start: 2024-01-31 | End: 2024-01-31 | Stop reason: HOSPADM

## 2024-01-31 RX ORDER — LIDOCAINE HYDROCHLORIDE 10 MG/ML
INJECTION, SOLUTION EPIDURAL; INFILTRATION; INTRACAUDAL; PERINEURAL AS NEEDED
Status: DISCONTINUED | OUTPATIENT
Start: 2024-01-31 | End: 2024-01-31 | Stop reason: SURG

## 2024-01-31 RX ORDER — SODIUM CHLORIDE 0.9 % (FLUSH) 0.9 %
10 SYRINGE (ML) INJECTION EVERY 12 HOURS SCHEDULED
Status: DISCONTINUED | OUTPATIENT
Start: 2024-01-31 | End: 2024-01-31 | Stop reason: HOSPADM

## 2024-01-31 RX ADMIN — SODIUM CHLORIDE: 9 INJECTION, SOLUTION INTRAVENOUS at 11:20

## 2024-01-31 RX ADMIN — NEOSTIGMINE METHYLSULFATE 3 MG: 0.5 INJECTION INTRAVENOUS at 13:28

## 2024-01-31 RX ADMIN — DEXAMETHASONE SODIUM PHOSPHATE 8 MG: 4 INJECTION, SOLUTION INTRAMUSCULAR; INTRAVENOUS at 11:40

## 2024-01-31 RX ADMIN — ROCURONIUM BROMIDE 20 MG: 10 SOLUTION INTRAVENOUS at 12:45

## 2024-01-31 RX ADMIN — LIDOCAINE HYDROCHLORIDE 50 MG: 10 INJECTION, SOLUTION EPIDURAL; INFILTRATION; INTRACAUDAL; PERINEURAL at 11:33

## 2024-01-31 RX ADMIN — ROCURONIUM BROMIDE 50 MG: 10 SOLUTION INTRAVENOUS at 11:33

## 2024-01-31 RX ADMIN — Medication 200 MCG: at 13:11

## 2024-01-31 RX ADMIN — FENTANYL CITRATE 100 MCG: 50 INJECTION, SOLUTION INTRAMUSCULAR; INTRAVENOUS at 11:30

## 2024-01-31 RX ADMIN — PROPOFOL 150 MG: 10 INJECTION, EMULSION INTRAVENOUS at 11:33

## 2024-01-31 RX ADMIN — FAMOTIDINE 20 MG: 10 INJECTION, SOLUTION INTRAVENOUS at 10:21

## 2024-01-31 RX ADMIN — ONDANSETRON 4 MG: 2 INJECTION INTRAMUSCULAR; INTRAVENOUS at 13:28

## 2024-01-31 RX ADMIN — GLYCOPYRROLATE 0.4 MG: 0.2 INJECTION INTRAMUSCULAR; INTRAVENOUS at 13:28

## 2024-01-31 RX ADMIN — GLYCOPYRROLATE 0.4 MG: 0.2 INJECTION INTRAMUSCULAR; INTRAVENOUS at 12:26

## 2024-01-31 RX ADMIN — PHENYLEPHRINE HYDROCHLORIDE 1 MCG/KG/MIN: 10 INJECTION INTRAVENOUS at 11:40

## 2024-01-31 NOTE — Clinical Note
Prepped: groin and chest. Prepped with: ChloraPrep. The site was clipped. The patient was draped in a sterile fashion.

## 2024-01-31 NOTE — OP NOTE
"          Cardiac Electrophysiology Procedure Note           [facility] at [facility]         CATHETER ABLATION FOR ATRIAL FIBRILLATION (PVI)    PROCEDURES PERFORMED:    Catheter ablation of persistent atrial fibrillation  Adjunctive ablation of left atrial roof for persistent atrial fibrillation  Adjunctive ablation of left atrial posterior wall for persistent atrial fibrillation  Catheter ablation of SVT #1, typical right atrial flutter  Loop monitor implantation  Full diagnostic EP study  3D electroanatomic mapping  drug infusion / programmed pacing  Intracadiac Echocardiography  Single transeptal puncture  Left ventricular pacing and recording  Advantage    PREPROCEDURAL DIAGNOSES:    1.  Persistent atrial fibrillation  2. [id number] score of 4  3.  Typical atrial flutter    POST PROCEDURE DIANGOSES:  As above.    INDICATION FOR PROCEDURE:  Briefly, Ángel Parikh is a 78 y.o. year old male with a history of highly symptomatic recurrent persistent atrial fibrillation as well as typical right atrial flutter presents for elective cath ablation and loop monitor implantation.    ANTICOAGULATION STRATEGY PRIOR TO AND POST PROCEDURE: DOAC.  The last dose of anticoagulant was confirmed to have been taken this past evening.      PT/INR:  No results found for: \"LABPROT\", \"INR\"  PTT:  No results found for: \"APTT\"    CBC: No results found for: \"WBC\", \"RBC\", \"HGB\", \"HCT\", \"MCV\", \"RDW\", \"PLT\"  BMP:   No results found for: \"NA\", \"K\", \"CL\", \"CO2\", \"BUN\", \"CREATININE\", \"LABCREA\", \"EGFR\", \"GLU\", \"LABGLUC\"    Vital Signs: /74   Pulse 92   Temp 96.8 °F (36 °C) (Temporal)   Ht 167.6 cm (66\")   Wt 100 kg (221 lb)   SpO2 96%   BMI 35.67 kg/m²      Admit Weight:  100 kg (221 lb)  BMI: Body mass index is 35.67 kg/m².    PROCEDURE NARRATIVE:    The patient was able to give written informed consent after revisiting the key portions of the risk versus benefit profile of the procedure.  This discussion " was framed by our lengthy conversations  (please see our detailed notes).  Patient verbalized strong understanding of this discussion and a strong desire to proceed with the procedure.  Please note that this detailed informed consent process utilized mutual and shared decision making process between all parties involved, principally the physician and patient, but also potentially with input from the patient's selected family and friends.    The patient was brought to the EP laboratory in the post absorptive state.  The patient was electively intubated for the procedure and given a general anesthetic by colleagues from anesthesia.   An yung-gastric tube was placed by anesthesia staff.  A radial artery catheter was placed by anesthesia staff for continuous blood pressure monitoring.  Please see the detailed anesthesia records.    The patient was then prepared and draped in a routing sterile fashion.  Seldinger access was obtained at the bilateral common femoral veins with 5 venipunctures.  J tip wires were advanced into the vascular space.  Short 11,8,6 Sinhala sheaths, an SL0 sheath and a deflectable sheath were placed into the bilateral common femoral veins and the inferior vena cava / right atrium in an over the wire fashion.    The patient was anticoagulated with intravenous heparin (initial bolus and then continuous infusion) with a goal ACT of between 350 and 400 seconds.  A phased array ICE catheter was placed into the right atrium and right ventricle.  This was used for transeptal puncture, monitoring of the pericardial space, monitoring of the ablation catheter position within the heart and monitoring of other cardiac structures such as the left atrial appendage (to document the absence of thrombus), pulmonary veins, esophagus, mitral valve annulus and other cardiac structures.    Double transeptal puncture was performed after heparin administration with a combination of echocardiographic and fluoroscopic  guidance.  This was performed with the long sheaths, a BRK needle, and a SafeSept transeptal wire.  Catheters and sheaths were advanced safely into the left atrium.  A 64 electrode Entigo diagnostic electrophysiology catheter and a [employer] [place] [employer] irrigated tip ablation catheter were used for pacing and recording in the left atrium, left atrial appendage, pulmonary veins and left ventricle at various times throughout the procedure.  We used the 100du.tv 3D electroanatomic mapping system in conjunction with these catheters to construct an electroanatomic shell of the left atrium, left atrial appendage, mitral valve annulus, interatrial septum and pulmonary veins.  Left ventricular pacing and recording were performed by placing catheters safely and carefully across the mitral valve annulus to the left ventricle from the left atrium.  Adequate sensing and pacing thresholds were obtained from the left ventricle.  AV conduction ( antegrade ) as well as VA conduction ( retrograde ) were studied.  This was performed as a distinct addition to the diagnostic EP study.  Findings were conclusive for no accessory pathway and VA dissociation.    An EP study was performed.  Data obtained from this is listed in the below table:    Initial Study    Isuprel Washout Study           drive train / burst extra    QRS 90    Rhythm          Atrial CL      R-R 785    Ventricular CL      AH 84    AVBCL      HV 40    AVNERP       drive train / burst extrastim   Slow Pway ERP      Rhythm     Fast Pway ERP      Atrial CL     VABCL conc? /  Dec?      Ventricular CL     VAERP      AVBCL 410    AERP      AVNERP 600 350   VERP      Slow Pway ERP     AP antegrade ERP      Fast Pway ERP     AP retrograde ERP      VABCL conc? /  Dec?           VAERP    Final Study      AERP      drive train / burst extrastim    VERP     Rhythm      AP antegrade ERP     Atrial CL      AP retrograde ERP     Ventricular CL           AVBCL     Isuprel Dose =       AVNERP       drive train / burst extrastim   Slow Pway ERP      Rhythm     Fast Pway ERP      Atrial CL     VABCL conc? /  Dec?      Ventricular CL     VAERP      AVBCL     AERP      AVNERP     VERP      Slow Pway ERP     AP antegrade ERP      Fast Pway ERP     AP retrograde ERP      VABCL conc? /  Dec?           VAERP           AERP           VERP           AP antegrade ERP           AP retrograde ERP                       Patient into the room in atrial fibrillation.  High-density mapping left atrium was performed.  This revealed modest amount of left atrial myopathy.    Catheter ablation was performed to achieve pulmonary vein isolation.  A wide antral circumferential ablation approach was used.  Ablation was performed with proprietary [employer] multielectrode catheter system.      Additional lesions were given within the antral lesion set at the juan jose between superior and inferior veins to achieve total isolation, when and where necessary.      Patient remained in atrial fibrillation.  Additional adjunctive ablation was performed at the left atrial roof connecting the right superior and left common pulmonary veins.    Patient remained in atrial fibrillation.  Additional adjunctive ablation was performed to the left atrial posterior wall connecting the right inferior and left inferior pulmonary veins.    We proceeded and isolating the entire left atrial posterior wall.    After completing the antral ablation lesion set, I interrogated the pulmonary veins using and documented pulmonary vein isolation.    We turned our attention to performing typical atrial flutter ablation.  Please note that this is a separate SVT with a distinct mechanism from the patients atrial fibrillation.  We used the 3 dimensional electroanatomic mapping system to reconstruct a detailed structure of the right atrium AV node his bundle position and triangle of Goodwin.    Ablation was performed along the cavo tricuspid isthmus  beginning at the ventricular aspect in extended to the caval aspect of the cavo tricuspid isthmus.  Catheter ablation was performed with the proprietary [employer] ablation system.    We then demonstrated that there was bidirectional block with differential pacing maneuvers.    Adenosine 12 mg was administered intravenously following ablation to test for other atrial and or ventricular arrhythmias.   Programmed stimulation was performed in an attempt to induce other and additional arrhythmias.  No arrhythmias were induced during administration and washout.    The ICE catheter revealed that there was no pericardial effusion.    Catheters and sheaths were then removed from the body.    Hemostasis was achieved with Vascade closure devices.    We then turned our attention to implanting the patient's loop monitor.  A [employer] loop monitor was selected.  The anterior chest wall was prepared draped routine sterile fashion lidocaine was administered to the skin approximately the fourth intercostal space on the left side.  The device was then injected using the supplied implant tools.  Sensing was excellent.  The wound was then closed in fashion with surgical glue followed by sterile occlusive dressing.    The patient was extubated in routine fashion and transferred to PACU in stable condition.    COMPLICATIONS: None    EBL: minimal    KEY PROCEDURAL FINDINGS:  Normal AV node and His-Purkinje system function  Significant left atrial enlargement however normal left atrial pressure, moderate amount of left atrial myopathy  Successful wide antral circumferential pulmonary and isolation isolation of the entire left atrial posterior wall as well as typical flutter ablation  Loop monitor implantation  Advantage    POST PROCEDURAL PLAN:    Report was called the the PACU nurse responsible for the patients care.  Uninterrupted anticoagulation for not less than 90 days unless specially instructed otherwise by myself or  another member of our EP physician team.  Please note that the patient and the patient’s family have been extensively counseled about this critical requirement and have agreed to comply.  Esophageal prophylaxis with proton pump inhibitor for 90 days.  Anticipate discharge this day.  Medications were reconciled, and key changes in medications include: Stop antiarrhythmic drug  Patient and family instructed to call immediately for fever greater than 101.5 degrees F, hematemesis, increasing or severe chest pain, increasing shortness of breath, bleeding or other concerns.  Patient and family instructed that some chest discomfort is normal and is to be expected and that this should be expected to decrease over the first 3-4 days after the ablation procedure.  The patient will be seen at our office per routine follow up.      Luis Atwood DO, FACC, RS  Cardiac Electrophysiologist  [facility] / [facility]

## 2024-01-31 NOTE — ANESTHESIA POSTPROCEDURE EVALUATION
Patient: Ángel Parikh    Procedure Summary       Date: 01/31/24 Room / Location: [facility] / [facility]    Anesthesia Start: 1120 Anesthesia Stop: 1342    Procedure: Ablation atrial fibrillation (PFA). DNS Xarelto. Hold Flecainide 3 days prior. Diagnosis:       Atrial fibrillation, persistent      (Afib)    Providers: Luis Atwood DO Provider: Jack Louise MD    Anesthesia Type: general ASA Status: 3            Anesthesia Type: general    Vitals  No vitals data found for the desired time range.  /70  T 97F  RR 12  SPO2 97% 2L NC  HR 65 SR        Post Anesthesia Care and Evaluation    Patient location during evaluation: bedside  Patient participation: complete - patient participated  Level of consciousness: awake and alert  Pain management: adequate    Airway patency: patent  Anesthetic complications: No anesthetic complications  PONV Status: none  Cardiovascular status: hemodynamically stable and acceptable  Respiratory status: nonlabored ventilation, acceptable and nasal cannula  Hydration status: acceptable

## 2024-01-31 NOTE — INTERVAL H&P NOTE
H&P reviewed. The patient was examined and there are no changes to the H&P.       EP Pre-Procedure Report  Cardiovascular Laboratory  [facility]    Patient:  Ángel Parikh  :  1945    DATE: 2024      MEDICATIONS:  Prior to Admission medications    Medication Sig Start Date End Date Taking? Authorizing Provider   ascorbic acid (VITAMIN C) 100 MG tablet Take  by mouth.  Patient not taking: Reported on 1/15/2024    Ophelia Mcgrath MD   aspirin 81 MG EC tablet Take 1 tablet by mouth Daily. Stopped for surgery    Ophelia Mcgrath MD   atorvastatin (LIPITOR) 20 MG tablet TAKE 1 TABLET AT BEDTIME FOR CHOLESTEROL  Patient taking differently: Take 1 tablet by mouth Every Night. 23   Perez Pompa MD   doxazosin (CARDURA) 4 MG tablet TAKE 1 TABLET EVERY NIGHT  Patient taking differently: Take 1 tablet by mouth Every Night. 23   Jack Platt MD   doxepin (SINEquan) 10 MG capsule Take  by mouth.    Ophelia Mcgrath MD   Entresto 24-26 MG tablet Take 1 tablet by mouth 2 (Two) Times a Day.  Patient taking differently: Take 1 tablet by mouth Every Night. 23   Jack Platt MD   ferrous gluconate (FERGON) 240 (27 FE) MG tablet Take 1 tablet by mouth Daily. 1/10/24   Ophelia Mcgrath MD   flecainide (TAMBOCOR) 50 MG tablet Take 1 tablet by mouth Every 12 (Twelve) Hours.  Patient not taking: Reported on 1/15/2024    Ophelia Mcgrath MD   fluticasone (FLONASE) 50 MCG/ACT nasal spray 2 sprays into the nostril(s) as directed by provider Daily. 23   Jack Platt MD   metoprolol tartrate (LOPRESSOR) 100 MG tablet Take 1 tablet by mouth 2 (Two) Times a Day. 100mg twice a day    Ophelia Mcgrath MD   Multiple Vitamins-Minerals (ICAPS AREDS 2 PO) Take 2 capsules by mouth 2 (Two) Times a Day.    Ophelia Mcgrath MD   multivitamin with minerals (MULTIVITAMIN ADULT PO) Take 1 tablet by mouth Daily.    Ophelia Mcgrath MD   oxybutynin XL  (DITROPAN-XL) 10 MG 24 hr tablet Take 1 tablet by mouth Daily.  Patient not taking: Reported on 1/15/2024    Ophelia Mcgrath MD   pantoprazole (PROTONIX) 40 MG EC tablet Take 1 tablet by mouth Daily.    Ophelia Mcgrath MD   rivaroxaban (XARELTO) 20 MG tablet Take 1 tablet by mouth Daily.    Ophelia Mcgrath MD   Vibegron (Gemtesa) 75 MG tablet Take 1 tablet by mouth Daily.    Ophelia Mcgrath MD   vitamin B-12 (CYANOCOBALAMIN) 1000 MCG tablet Take 0.5 tablets by mouth Daily.  Patient not taking: Reported on 1/15/2024    Ophelia Mcgrath MD   vitamin C (ASCORBIC ACID) 500 MG tablet Take 2 tablets by mouth Daily.  Patient not taking: Reported on 1/15/2024    Ophelia Mcgrath MD       Past medical & surgical history, social and family history reviewed in the electronic medical record.    Physical Exam:    Vitals:   Vitals:    01/31/24 0912   BP: 136/96   Pulse: 84   Temp: 96.8 °F (36 °C)   SpO2: 99%          01/31/24 0912   Weight: 100 kg (221 lb)   Body mass index is 35.67 kg/m².    GENERAL: No apparent distress.  No significant changes since last exam.  CHEST: Clear to auscultation bilaterally no stridor no wheeze.  CV: S1, S2, Irregular without Murmurs, Rubs or Gallops  EXTREMITIES: No edema.    Results from last 7 days   Lab Units 01/26/24  1326   SODIUM mmol/L 142   POTASSIUM mmol/L 4.3   CHLORIDE mmol/L 108*   CO2 mmol/L 27.0   BUN mg/dL 19   CREATININE mg/dL 0.86   GLUCOSE mg/dL 115*   CALCIUM mg/dL 9.0     Results from last 7 days   Lab Units 01/26/24  1326   WBC 10*3/mm3 6.44   HEMOGLOBIN g/dL 13.8   HEMATOCRIT % 42.5   PLATELETS 10*3/mm3 218     CHADS-VASc Risk Assessment              3 Total Score    1 Hypertension    2 Age >/= 75        Criteria that do not apply:    CHF    DM    PRIOR STROKE/TIA/THROMBO    Vascular Disease    Age 65-74    Sex: Female              IMPRESSION:Cheif Complaint EP: Atrial Fibrillation    NYHA 0      PLAN:  Procedure to perform: Atrial  fibrillation ablation        Electronically signed by MORENITA Sanderson, 01/31/24, 9:39 AM EST.

## 2024-01-31 NOTE — ANESTHESIA PROCEDURE NOTES
Airway  Urgency: elective    Date/Time: 1/31/2024 11:35 AM  Airway not difficult    General Information and Staff    Patient location during procedure: OR  CRNA/CAA: Ivan Go CRNA    Indications and Patient Condition  Indications for airway management: airway protection    Preoxygenated: yes  MILS not maintained throughout  Mask difficulty assessment: 2 - vent by mask + OA or adjuvant +/- NMBA    Final Airway Details  Final airway type: endotracheal airway      Successful airway: ETT  Cuffed: yes   Successful intubation technique: video laryngoscopy  Endotracheal tube insertion site: oral  Blade: Merly  Blade size: 3  ETT size (mm): 7.5  Cormack-Lehane Classification: grade I - full view of glottis  Placement verified by: chest auscultation and capnometry   Measured from: lips  ETT/EBT  to lips (cm): 21  Number of attempts at approach: 1  Assessment: lips, teeth, and gum same as pre-op and atraumatic intubation    Additional Comments  Negative epigastric sounds, Breath sound equal bilaterally with symmetric chest rise and fall. Elective use of Dowd 2/2 pt neck flexion (for comfort_).

## 2024-01-31 NOTE — RESEARCH
Patient is feeling well. NIHSS assessment completed and score remains 0.  No adverse events since index procedure.

## 2024-01-31 NOTE — ANESTHESIA PREPROCEDURE EVALUATION
Anesthesia Evaluation     Patient summary reviewed and Nursing notes reviewed   no history of anesthetic complications:   NPO Solid Status: > 8 hours  NPO Liquid Status: > 2 hours           Airway   Mallampati: II  TM distance: >3 FB  Neck ROM: full  No difficulty expected  Dental    (+) partials    Pulmonary - normal exam    breath sounds clear to auscultation  (+) ,sleep apnea on CPAP  Cardiovascular     ECG reviewed  PT is on anticoagulation therapy  Rhythm: irregular  Rate: normal    (+) hypertension, valvular problems/murmurs (Mild AI, moderate MR) AI and MR, dysrhythmias Atrial Fib, murmur (III/VI JOSE E)    ROS comment: 1/26/24 Echo:  ·  Left ventricular systolic function is normal. Left ventricular ejection fraction appears to be 66 - 70%.  ·  Left atrial volume is mildly increased.  ·  Moderate mitral valve regurgitation is present.  ·  Estimated right ventricular systolic pressure from tricuspid regurgitation is mildly elevated (35-45 mmHg).  ·  Mild aortic valve regurgitation.      Neuro/Psych- negative ROS  GI/Hepatic/Renal/Endo    (+) obesity, hiatal hernia, GERD well controlled    Musculoskeletal     Abdominal    Substance History      OB/GYN          Other   arthritis,   history of cancer (Prostate ca s/p prostatectomy)                Anesthesia Plan    ASA 3     general     intravenous induction     Anesthetic plan, risks, benefits, and alternatives have been provided, discussed and informed consent has been obtained with: patient.    Plan discussed with CRNA.    CODE STATUS:

## 2024-02-01 ENCOUNTER — CALL CENTER PROGRAMS (OUTPATIENT)
Dept: CALL CENTER | Facility: HOSPITAL | Age: 79
End: 2024-02-01
Payer: MEDICARE

## 2024-02-01 LAB
QT INTERVAL: 376 MS
QT INTERVAL: 418 MS
QTC INTERVAL: 449 MS
QTC INTERVAL: 451 MS

## 2024-02-01 NOTE — OUTREACH NOTE
PCI/Device Survey      Flowsheet Row Responses   Facility patient discharged from? [facility]   Procedure date 01/31/24   Procedure (if device, specify in description) Device   Device Description ICM LP/RECRD CARD LUX/DX "[employer] - [id number] - [id number] and ABLATION   Performing MD Dr. Luis Atwood   Attempt successful? Yes   Call start time 1103   Call end time 1114   Has the patient had any of the following symptoms since discharge? --  [Mild chest discomfort as to be expected]   Is the patient taking prescribed medications: ASA  [xarelto]   Nursing intervention Reminded to continue to take prescribed medications   Medication comments Stop Flecainide   Does the patient have any of the following symptoms related to the cath/surgical site? --  [Left Femoral site with out issues and chest site with out issues however states the glue catching on clothing]   Does the patient have an appointment scheduled with the cardiologist? Yes   Appointment comments DR. Atwood appt 4/29/24 145 pm   If the patient is a current smoker, are they able to teach back resources for cessation? Not a smoker   Did the patient feel prepared to go home on the same day as the procedure? Yes   Is the patient satisfied with the same day discharge process? Yes   PCI/Device call completed Yes   Wrap up additional comments Pt reports he is doing well. Did have mild chest discomfort as to be expected. No issues with surgical sites except c/o glue catching on clothing. Pt has d/c paperwork and instructions and reviewed when to call Dr or seek treatment.            ANKIT DINERO - Registered Nurse

## 2024-02-09 ENCOUNTER — TELEPHONE (OUTPATIENT)
Dept: CARDIOLOGY | Facility: CLINIC | Age: 79
End: 2024-02-09
Payer: MEDICARE

## 2024-02-09 RX ORDER — METOPROLOL TARTRATE 50 MG/1
50 TABLET, FILM COATED ORAL 2 TIMES DAILY
Start: 2024-02-09

## 2024-02-09 NOTE — TELEPHONE ENCOUNTER
Chencho Zhang with research called to request patient's med list be updated. Pt takes metoprolol tartrate 50mg BID instead of 100mg BID. Med list updated.

## 2024-02-09 NOTE — TELEPHONE ENCOUNTER
Patient contacted for study visit.  He states he's feeling well with no symptoms of arrhythmia.  He still  does have complaints of continued fatigue & dyspnea on exertion which he called his cardiologist with & Dr. Scott lowered his metoprolol to 100 mg daily on 2/7/24.  He's had no adverse events since procedure & other than metoprolol dosage change, meds remain the same.  His ICM is transmitting successfully.

## 2024-03-04 ENCOUNTER — TELEPHONE (OUTPATIENT)
Dept: OTHER | Facility: OTHER | Age: 79
End: 2024-03-04
Payer: MEDICARE

## 2024-03-04 NOTE — RESEARCH
Patient contacted for study visit.  He is feeling well & was just out mowing the yard & states his dyspnea on exertion has improved. He's had no symptoms of arrhythmia. He's had no adverse events since last visit & meds remain the same.  His Lux ICM is transmitting successfully in [employer] system.

## 2024-03-19 ENCOUNTER — TELEPHONE (OUTPATIENT)
Dept: CARDIOLOGY | Facility: CLINIC | Age: 79
End: 2024-03-19
Payer: MEDICARE

## 2024-03-19 NOTE — TELEPHONE ENCOUNTER
I called to speak with Mr Parikh regarding a remote reading that shows a 5 second pause. He was not home but I left my name and number for a return call. Reading in [employer] for your review.

## 2024-04-08 NOTE — TELEPHONE ENCOUNTER
Caller: Ángel Parikh    Relationship: Self    Best call back number: [phone]     Who is your current provider: BRETT MEANS    Is your current provider offboarding? NO    Who would you like your new provider to be: DR URIBE    What are your reasons for transferring care: WANTS TO RETURN TO DR URIBE AS HIS PCP. OFFBOARDED DR PADILLA    Additional notes: PLEASE CALL TO SCHEDULE OFFICE VISIT FOR MEDICATION REFILLS WITH DR URIBE IF ABLE TO RETURN TO HIM.     PLEASE SCHEDULE PATIENT AND WIFE TOGETHER.

## 2024-04-08 NOTE — TELEPHONE ENCOUNTER
Caller: Ángel Parikh    Relationship: Self    Best call back number: [phone]     Requested Prescriptions:   Requested Prescriptions     Pending Prescriptions Disp Refills    doxazosin (CARDURA) 4 MG tablet 90 tablet 3     Si tablet Every Night.        Pharmacy where request should be sent: [facility] [address] [phone] [facility] [phone]      Last office visit with prescribing clinician: 2023   Last telemedicine visit with prescribing clinician: Visit date not found   Next office visit with prescribing clinician: Visit date not found     Additional details provided by patient: PLEASE SEND 90 DAY SUPPLY WITH REFILLS    Does the patient have less than a 3 day supply:  [] Yes  [x] No    Would you like a call back once the refill request has been completed: [] Yes [x] No    If the office needs to give you a call back, can they leave a voicemail: [x] Yes [] No    Dominga Krishnan   24 09:04 EDT

## 2024-04-09 RX ORDER — DOXAZOSIN MESYLATE 4 MG/1
4 TABLET ORAL NIGHTLY
Qty: 90 TABLET | Refills: 3 | Status: SHIPPED | OUTPATIENT
Start: 2024-04-09

## 2024-04-29 ENCOUNTER — OFFICE VISIT (OUTPATIENT)
Dept: CARDIOLOGY | Facility: CLINIC | Age: 79
End: 2024-04-29

## 2024-04-29 VITALS
WEIGHT: 228 LBS | HEART RATE: 73 BPM | OXYGEN SATURATION: 98 % | SYSTOLIC BLOOD PRESSURE: 122 MMHG | HEIGHT: 67 IN | BODY MASS INDEX: 35.79 KG/M2 | DIASTOLIC BLOOD PRESSURE: 80 MMHG

## 2024-04-29 DIAGNOSIS — I48.19 ATRIAL FIBRILLATION, PERSISTENT: Primary | ICD-10-CM

## 2024-04-29 DIAGNOSIS — Z79.01 CHRONIC ANTICOAGULATION: ICD-10-CM

## 2024-04-29 DIAGNOSIS — Z79.899 LONG TERM CURRENT USE OF ANTIARRHYTHMIC DRUG: ICD-10-CM

## 2024-04-29 DIAGNOSIS — G47.33 OSA ON CPAP: ICD-10-CM

## 2024-04-29 PROCEDURE — 99214 OFFICE O/P EST MOD 30 MIN: CPT | Performed by: INTERNAL MEDICINE

## 2024-04-29 PROCEDURE — 3079F DIAST BP 80-89 MM HG: CPT | Performed by: INTERNAL MEDICINE

## 2024-04-29 PROCEDURE — 3074F SYST BP LT 130 MM HG: CPT | Performed by: INTERNAL MEDICINE

## 2024-04-29 RX ORDER — FLUTICASONE PROPIONATE 50 MCG
2 SPRAY, SUSPENSION (ML) NASAL DAILY
COMMUNITY

## 2024-04-29 NOTE — PROGRESS NOTES
Cardiac Electrophysiology Outpatient Follow Up Note            [facility]    Follow Up Office Visit      Ángel Parikh  [id number]  04/29/2024  [unfilled]  [unfilled]    Primary Care Physician: Perez Pompa MD    Referred By: No ref. provider found    Subjective     Chief Complaint:   Diagnoses and all orders for this visit:    1. Atrial fibrillation, persistent (Primary)    2. Long term current use of antiarrhythmic drug    3. Chronic anticoagulation    4. MAE on CPAP      Chief Complaint   Patient presents with    Atrial fibrillation, persistent       History of Present Illness:   Ángel Parikh is a 78 y.o. male who presents to my electrophysiology clinic for follow up of follow-up after A-fib ablation doing well.  Feels a lot better..      Past Medical History:   Past Medical History:   Diagnosis Date    Anemia 11/22/2005    Arthritis     multi joint jose left knee     Cancer     prostate per elevated PSA and biopsies     Colonic polyp     Elevated PSA     Encounter for screening colonoscopy     Essential hypertension     Gastroesophageal reflux disease without esophagitis     H/O valvular heart disease     3 leaky valves- monitored by FMD and Dr Scott (sees every 6 months)    Hearing loss     hearing aids in both ears    Heart murmur     Hemorrhoid     bleeds on occasion     Hiatal hernia     High risk medication use     History of cellulitis     History of tobacco use     QUIT AT AGE 51    Hypercholesterolemia     Hyperglycemia     Hyperlipidemia     Morbid obesity     Overactive bladder     Psoriasis     spots on legs     Restless leg syndrome     Sleep apnea with use of continuous positive airway pressure (CPAP)     10 auto setting    Vitamin B12 deficiency     Vitamin D deficiency     Wears glasses     Wears partial dentures        Past Surgical History:   Past Surgical History:   Procedure Laterality Date    CARDIAC CATHETERIZATION N/A  2021    Procedure: Left Heart Cath 12771;  Surgeon: Reggie Scott MD;  Location:  [facility];  Service: Cardiovascular;  Laterality: N/A;    CARDIAC ELECTROPHYSIOLOGY PROCEDURE N/A 2024    Procedure: Ablation atrial fibrillation (PFA). DNS Xarelto. Hold Flecainide 3 days prior.;  Surgeon: Luis Atwood DO;  Location:  [facility];  Service: Cardiovascular;  Laterality: N/A;    COLONOSCOPY  11/10/2015    MOD SIGMOID DIVERTICULOSIS, NO POLYPS. PLAN: FIBER, REPEAT IN 10 YEARS.    COLONOSCOPY  2005    DIVERTICULOSIS, INT HEMERRHOIDS. NO POLYPS. EGD- REFLUX, NO BLEEDING. FOLLOW UP CAPSULE ENDOSCOPY AT  -NORMAL    CYSTOSCOPY AND NEEDLE BIOPSY PROSTATE      twice; last time 2017    PROSTATECTOMY N/A 10/03/2017    Procedure: PROSTATECTOMY LAPAROSCOPIC WITH DAVINCI ROBOT;  Surgeon: Jameel Ferguson Jr., MD;  Location:  [facility];  Service:        Family History:   Family History   Problem Relation Age of Onset    Stroke Mother     Hypertension Mother     Heart attack Father     Hypertension Father     Cirrhosis Brother        Social History:   Social History     Socioeconomic History    Marital status:    Tobacco Use    Smoking status: Former     Types: Pipe, Cigars     Quit date:      Years since quittin.3     Passive exposure: Past    Smokeless tobacco: Never    Tobacco comments:     smoked 3 cigars a day and pipe off and on    Vaping Use    Vaping status: Never Used    Passive vaping exposure: Yes   Substance and Sexual Activity    Alcohol use: Not Currently    Drug use: Never    Sexual activity: Defer       Medications:     Current Outpatient Medications:     aspirin 81 MG EC tablet, Take 1 tablet by mouth Daily. Stopped for surgery, Disp: , Rfl:     atorvastatin (LIPITOR) 20 MG tablet, TAKE 1 TABLET AT BEDTIME FOR CHOLESTEROL (Patient taking differently: Take 1 tablet by mouth Every Night.), Disp: 30 tablet, Rfl: 11    doxazosin  "(CARDURA) 4 MG tablet, Take 1 tablet by mouth Every Night., Disp: 90 tablet, Rfl: 3    Entresto 24-26 MG tablet, Take 1 tablet by mouth 2 (Two) Times a Day., Disp: 60 tablet, Rfl: 5    ferrous gluconate (FERGON) 240 (27 FE) MG tablet, Take 1 tablet by mouth Daily., Disp: , Rfl:     fluticasone (FLONASE) 50 MCG/ACT nasal spray, 2 sprays into the nostril(s) as directed by provider Daily., Disp: , Rfl:     metoprolol tartrate (LOPRESSOR) 50 MG tablet, Take 1 tablet by mouth 2 (Two) Times a Day. 100mg twice a day, Disp: , Rfl:     Multiple Vitamins-Minerals (ICAPS AREDS 2 PO), Take 2 capsules by mouth 2 (Two) Times a Day., Disp: , Rfl:     multivitamin with minerals (MULTIVITAMIN ADULT PO), Take 1 tablet by mouth Daily., Disp: , Rfl:     pantoprazole (PROTONIX) 40 MG EC tablet, Take 1 tablet by mouth Daily., Disp: , Rfl:     rivaroxaban (XARELTO) 20 MG tablet, Take 1 tablet by mouth Daily., Disp: , Rfl:     Vibegron (Gemtesa) 75 MG tablet, Take 1 tablet by mouth Daily., Disp: , Rfl:     vitamin B-12 (CYANOCOBALAMIN) 1000 MCG tablet, Take 0.5 tablets by mouth Daily. (Patient not taking: Reported on 4/29/2024), Disp: , Rfl:     Allergies:   Allergies   Allergen Reactions    Levofloxacin Itching and Rash    Meloxicam Swelling    Percocet [Oxycodone-Acetaminophen] Rash     Chest and back red       Objective   Vital Signs:   Vitals:    04/29/24 1346   BP: 122/80   BP Location: Left arm   Patient Position: Sitting   Pulse: 73   SpO2: 98%   Weight: 103 kg (228 lb)   Height: 170.2 cm (67\")       PHYSICAL EXAM  General appearance: Awake, alert, cooperative  Head: Normocephalic, without obvious abnormality, atraumatic  Eyes: Conjunctivae/corneas clear, EOMs intact  Neck: no adenopathy, no carotid bruit, no JVD, and thyroid: not enlarged  Lungs: clear to auscultation bilaterally and no rhonchi or crackles\", ' symmetric  Heart: regular rate and rhythm, S1, S2 normal, no murmur, click, rub or gallop  Abdomen: Soft, non-tender, bowel " sounds normal,  no organomegaly  Extremities: extremities normal, atraumatic, no cyanosis or edema  Skin: Skin color, turgor normal, no rashes or lesions  Neurologic: Grossly normal     Lab Results   Component Value Date    GLUCOSE 146 (H) 01/31/2024    CALCIUM 8.8 01/31/2024     01/31/2024    K 4.2 01/31/2024    CO2 23.0 01/31/2024     01/31/2024    BUN 19 01/31/2024    CREATININE 0.87 01/31/2024    EGFRIFNONA 75 11/10/2021    BCR 21.8 01/31/2024    ANIONGAP 12.0 01/31/2024     Lab Results   Component Value Date    WBC 8.60 01/31/2024    HGB 12.4 (L) 01/31/2024    HCT 37.8 01/31/2024    MCV 92.0 01/31/2024     01/31/2024     Lab Results   Component Value Date    INR 0.98 09/29/2017    PROTIME 10.7 09/29/2017     Lab Results   Component Value Date    TSH 4.680 (H) 06/01/2022       Cardiac Testing:     I personally viewed and interpreted the patient's EKG/Telemetry/lab data    Procedures    Tobacco Cessation: N/A  Obstructive Sleep Apnea Screening: Completed    Advance Care Planning   ACP discussion was declined by the patient. Patient does not have an advance directive, declines further assistance.       Assessment & Plan    Diagnoses and all orders for this visit:    1. Atrial fibrillation, persistent (Primary)    2. Long term current use of antiarrhythmic drug    3. Chronic anticoagulation    4. MAE on CPAP         Diagnosis Plan   1. Atrial fibrillation, persistent  Rhythm control strategy for highly symptomatic persistent A-fib.  Status post catheter ablation left atrium extensively in January.  Sinus rhythm since.  Feels a lot better.  Implanted loop monitor reveals no A-fib.      2. Long term current use of antiarrhythmic drug  Off antiarrhythmic drug therapy.      3. Chronic anticoagulation  Long-term anticoagulation.  Primary prevention of stroke.      4. MAE on CPAP  CPAP compliant.  Doing well.        Body mass index is 35.71 kg/m².    I spent 38 minutes in consultation with this patient  which included more than 65% of this time in direct face-to-face counseling, physical examination and discussion of my assessment and findings and this shared decision making with the patient.  The remainder of the time not spent face-to-face was performing one, some or all of the following actions: preparing to see the patient (e.g. reviewing tests, prior clinicians' notes, etc), ordering medications, tests or procedures, coordination of care, discussion of the plan with other healthcare providers, documenting clinical information in epic as well as independently interpreting results and communication of these results to the patient family and/or caregiver(s).  Please note that this explicitly excludes time spent on other separate billable services such as performing procedures or test interpretation, when applicable.      Follow Up:       Thank you for allowing me to participate in the care of your patient. Please to not hesitate to contact me with additional questions or concerns.      Luis Atwood DO, FACC, RS  Cardiac Electrophysiologist  [facility]

## 2024-05-06 ENCOUNTER — OFFICE VISIT (OUTPATIENT)
Dept: CARDIOLOGY | Facility: CLINIC | Age: 79
End: 2024-05-06
Payer: MEDICARE

## 2024-05-06 VITALS
WEIGHT: 227.4 LBS | OXYGEN SATURATION: 98 % | DIASTOLIC BLOOD PRESSURE: 78 MMHG | BODY MASS INDEX: 36.55 KG/M2 | SYSTOLIC BLOOD PRESSURE: 145 MMHG | HEIGHT: 66 IN | HEART RATE: 48 BPM

## 2024-05-06 DIAGNOSIS — Z95.818 STATUS POST PLACEMENT OF IMPLANTABLE LOOP RECORDER: ICD-10-CM

## 2024-05-06 DIAGNOSIS — E78.2 MIXED HYPERLIPIDEMIA: ICD-10-CM

## 2024-05-06 DIAGNOSIS — G47.33 OSA ON CPAP: ICD-10-CM

## 2024-05-06 DIAGNOSIS — I10 ESSENTIAL HYPERTENSION: ICD-10-CM

## 2024-05-06 DIAGNOSIS — I48.19 ATRIAL FIBRILLATION, PERSISTENT: Primary | ICD-10-CM

## 2024-05-06 RX ORDER — ATORVASTATIN CALCIUM 20 MG/1
20 TABLET, FILM COATED ORAL NIGHTLY
Qty: 90 TABLET | Refills: 3 | Status: SHIPPED | OUTPATIENT
Start: 2024-05-06

## 2024-05-06 RX ORDER — METOPROLOL TARTRATE 50 MG/1
25 TABLET, FILM COATED ORAL 2 TIMES DAILY
Start: 2024-05-06

## 2024-05-08 ENCOUNTER — OFFICE VISIT (OUTPATIENT)
Age: 79
End: 2024-05-08
Payer: MEDICARE

## 2024-05-08 VITALS
DIASTOLIC BLOOD PRESSURE: 82 MMHG | WEIGHT: 223.1 LBS | SYSTOLIC BLOOD PRESSURE: 140 MMHG | BODY MASS INDEX: 37.17 KG/M2 | HEIGHT: 65 IN

## 2024-05-08 DIAGNOSIS — M25.461 BILATERAL KNEE EFFUSIONS: ICD-10-CM

## 2024-05-08 DIAGNOSIS — M25.561 PAIN IN BOTH KNEES, UNSPECIFIED CHRONICITY: Primary | ICD-10-CM

## 2024-05-08 DIAGNOSIS — M25.462 BILATERAL KNEE EFFUSIONS: ICD-10-CM

## 2024-05-08 DIAGNOSIS — M17.0 BILATERAL PRIMARY OSTEOARTHRITIS OF KNEE: ICD-10-CM

## 2024-05-08 DIAGNOSIS — M25.562 PAIN IN BOTH KNEES, UNSPECIFIED CHRONICITY: Primary | ICD-10-CM

## 2024-05-08 PROCEDURE — 3077F SYST BP >= 140 MM HG: CPT | Performed by: STUDENT IN AN ORGANIZED HEALTH CARE EDUCATION/TRAINING PROGRAM

## 2024-05-08 PROCEDURE — 3079F DIAST BP 80-89 MM HG: CPT | Performed by: STUDENT IN AN ORGANIZED HEALTH CARE EDUCATION/TRAINING PROGRAM

## 2024-05-08 PROCEDURE — 1160F RVW MEDS BY RX/DR IN RCRD: CPT | Performed by: STUDENT IN AN ORGANIZED HEALTH CARE EDUCATION/TRAINING PROGRAM

## 2024-05-08 PROCEDURE — 1159F MED LIST DOCD IN RCRD: CPT | Performed by: STUDENT IN AN ORGANIZED HEALTH CARE EDUCATION/TRAINING PROGRAM

## 2024-05-08 PROCEDURE — 20611 DRAIN/INJ JOINT/BURSA W/US: CPT | Performed by: STUDENT IN AN ORGANIZED HEALTH CARE EDUCATION/TRAINING PROGRAM

## 2024-05-08 PROCEDURE — 99204 OFFICE O/P NEW MOD 45 MIN: CPT | Performed by: STUDENT IN AN ORGANIZED HEALTH CARE EDUCATION/TRAINING PROGRAM

## 2024-05-08 RX ORDER — LIDOCAINE HYDROCHLORIDE 10 MG/ML
4 INJECTION, SOLUTION EPIDURAL; INFILTRATION; INTRACAUDAL; PERINEURAL
Status: COMPLETED | OUTPATIENT
Start: 2024-05-08 | End: 2024-05-08

## 2024-05-08 RX ORDER — TRIAMCINOLONE ACETONIDE 40 MG/ML
80 INJECTION, SUSPENSION INTRA-ARTICULAR; INTRAMUSCULAR
Status: COMPLETED | OUTPATIENT
Start: 2024-05-08 | End: 2024-05-08

## 2024-05-08 RX ORDER — BUPIVACAINE HYDROCHLORIDE 5 MG/ML
4 INJECTION, SOLUTION EPIDURAL; INTRACAUDAL
Status: COMPLETED | OUTPATIENT
Start: 2024-05-08 | End: 2024-05-08

## 2024-05-08 RX ORDER — LANOLIN ALCOHOL/MO/W.PET/CERES
1000 CREAM (GRAM) TOPICAL DAILY
COMMUNITY

## 2024-05-08 RX ADMIN — BUPIVACAINE HYDROCHLORIDE 4 ML: 5 INJECTION, SOLUTION EPIDURAL; INTRACAUDAL at 15:40

## 2024-05-08 RX ADMIN — TRIAMCINOLONE ACETONIDE 80 MG: 40 INJECTION, SUSPENSION INTRA-ARTICULAR; INTRAMUSCULAR at 15:40

## 2024-05-08 RX ADMIN — LIDOCAINE HYDROCHLORIDE 4 ML: 10 INJECTION, SOLUTION EPIDURAL; INFILTRATION; INTRACAUDAL; PERINEURAL at 15:40

## 2024-05-31 ENCOUNTER — OFFICE VISIT (OUTPATIENT)
Dept: FAMILY MEDICINE CLINIC | Facility: CLINIC | Age: 79
End: 2024-05-31
Payer: MEDICARE

## 2024-05-31 VITALS
DIASTOLIC BLOOD PRESSURE: 80 MMHG | BODY MASS INDEX: 34.81 KG/M2 | HEART RATE: 55 BPM | HEIGHT: 67 IN | WEIGHT: 221.8 LBS | SYSTOLIC BLOOD PRESSURE: 138 MMHG | OXYGEN SATURATION: 97 %

## 2024-05-31 DIAGNOSIS — R06.02 SHORTNESS OF BREATH: ICD-10-CM

## 2024-05-31 DIAGNOSIS — Z23 NEED FOR PROPHYLACTIC VACCINATION AGAINST STREPTOCOCCUS PNEUMONIAE (PNEUMOCOCCUS): ICD-10-CM

## 2024-05-31 DIAGNOSIS — I10 ESSENTIAL HYPERTENSION: ICD-10-CM

## 2024-05-31 DIAGNOSIS — E78.5 HYPERLIPIDEMIA, UNSPECIFIED HYPERLIPIDEMIA TYPE: ICD-10-CM

## 2024-05-31 DIAGNOSIS — M17.0 PRIMARY OSTEOARTHRITIS OF BOTH KNEES: ICD-10-CM

## 2024-05-31 DIAGNOSIS — Z79.01 CHRONIC ANTICOAGULATION: ICD-10-CM

## 2024-05-31 DIAGNOSIS — G47.33 OSA ON CPAP: ICD-10-CM

## 2024-05-31 DIAGNOSIS — K21.9 GASTROESOPHAGEAL REFLUX DISEASE WITHOUT ESOPHAGITIS: ICD-10-CM

## 2024-05-31 DIAGNOSIS — I48.19 ATRIAL FIBRILLATION, PERSISTENT: ICD-10-CM

## 2024-05-31 DIAGNOSIS — N32.81 OAB (OVERACTIVE BLADDER): ICD-10-CM

## 2024-05-31 DIAGNOSIS — E66.09 CLASS 1 OBESITY DUE TO EXCESS CALORIES WITH SERIOUS COMORBIDITY AND BODY MASS INDEX (BMI) OF 34.0 TO 34.9 IN ADULT: ICD-10-CM

## 2024-05-31 DIAGNOSIS — Z11.59 NEED FOR HEPATITIS C SCREENING TEST: ICD-10-CM

## 2024-05-31 DIAGNOSIS — D50.8 OTHER IRON DEFICIENCY ANEMIA: ICD-10-CM

## 2024-05-31 DIAGNOSIS — Z95.818 STATUS POST PLACEMENT OF IMPLANTABLE LOOP RECORDER: ICD-10-CM

## 2024-05-31 DIAGNOSIS — Z85.46 HISTORY OF MALIGNANT NEOPLASM OF PROSTATE: ICD-10-CM

## 2024-05-31 DIAGNOSIS — E53.8 VITAMIN B12 DEFICIENCY: ICD-10-CM

## 2024-05-31 DIAGNOSIS — R73.9 HYPERGLYCEMIA: ICD-10-CM

## 2024-05-31 DIAGNOSIS — Z00.00 GENERAL MEDICAL EXAM: Primary | ICD-10-CM

## 2024-05-31 PROBLEM — E66.811 CLASS 1 OBESITY DUE TO EXCESS CALORIES WITH SERIOUS COMORBIDITY AND BODY MASS INDEX (BMI) OF 34.0 TO 34.9 IN ADULT: Status: ACTIVE | Noted: 2024-05-31

## 2024-05-31 PROBLEM — Z79.899 LONG TERM CURRENT USE OF ANTIARRHYTHMIC DRUG: Status: RESOLVED | Noted: 2024-01-15 | Resolved: 2024-05-31

## 2024-05-31 PROBLEM — N42.9 PROSTATE DISORDER: Status: RESOLVED | Noted: 2023-12-28 | Resolved: 2024-05-31

## 2024-05-31 PROBLEM — R94.39 ABNORMAL STRESS TEST: Status: RESOLVED | Noted: 2021-01-07 | Resolved: 2024-05-31

## 2024-05-31 PROBLEM — C61 PROSTATE CANCER: Status: RESOLVED | Noted: 2017-10-03 | Resolved: 2024-05-31

## 2024-05-31 PROBLEM — J30.9 ALLERGIC RHINITIS: Status: RESOLVED | Noted: 2023-12-29 | Resolved: 2024-05-31

## 2024-05-31 PROBLEM — R53.83 OTHER FATIGUE: Status: RESOLVED | Noted: 2022-06-13 | Resolved: 2024-05-31

## 2024-05-31 PROBLEM — J20.9 ACUTE BRONCHITIS: Status: RESOLVED | Noted: 2023-10-23 | Resolved: 2024-05-31

## 2024-05-31 PROCEDURE — 1159F MED LIST DOCD IN RCRD: CPT | Performed by: FAMILY MEDICINE

## 2024-05-31 PROCEDURE — 99214 OFFICE O/P EST MOD 30 MIN: CPT | Performed by: FAMILY MEDICINE

## 2024-05-31 PROCEDURE — G0009 ADMIN PNEUMOCOCCAL VACCINE: HCPCS | Performed by: FAMILY MEDICINE

## 2024-05-31 PROCEDURE — 3079F DIAST BP 80-89 MM HG: CPT | Performed by: FAMILY MEDICINE

## 2024-05-31 PROCEDURE — 1170F FXNL STATUS ASSESSED: CPT | Performed by: FAMILY MEDICINE

## 2024-05-31 PROCEDURE — 1126F AMNT PAIN NOTED NONE PRSNT: CPT | Performed by: FAMILY MEDICINE

## 2024-05-31 PROCEDURE — 3075F SYST BP GE 130 - 139MM HG: CPT | Performed by: FAMILY MEDICINE

## 2024-05-31 PROCEDURE — 90677 PCV20 VACCINE IM: CPT | Performed by: FAMILY MEDICINE

## 2024-05-31 PROCEDURE — G0439 PPPS, SUBSEQ VISIT: HCPCS | Performed by: FAMILY MEDICINE

## 2024-05-31 PROCEDURE — 1160F RVW MEDS BY RX/DR IN RCRD: CPT | Performed by: FAMILY MEDICINE

## 2024-05-31 RX ORDER — DOXAZOSIN MESYLATE 4 MG/1
4 TABLET ORAL NIGHTLY
Qty: 90 TABLET | Refills: 2 | Status: SHIPPED | OUTPATIENT
Start: 2024-05-31

## 2024-05-31 RX ORDER — SACUBITRIL AND VALSARTAN 24; 26 MG/1; MG/1
1 TABLET, FILM COATED ORAL 2 TIMES DAILY
Qty: 180 TABLET | Refills: 2 | Status: SHIPPED | OUTPATIENT
Start: 2024-05-31

## 2024-05-31 RX ORDER — PANTOPRAZOLE SODIUM 40 MG/1
40 TABLET, DELAYED RELEASE ORAL DAILY
Qty: 90 TABLET | Refills: 2 | Status: SHIPPED | OUTPATIENT
Start: 2024-05-31

## 2024-05-31 NOTE — ASSESSMENT & PLAN NOTE
Continue to follow-up with urology he does continue with surveillance PSA monitoring with his urologist.

## 2024-05-31 NOTE — ASSESSMENT & PLAN NOTE
He does plan for knee replacement surgery later this year and is using Tylenol as needed.  We did discuss that with the Xarelto for atrial fibrillation he would not want to take any NSAIDs due to increased bleeding risk

## 2024-05-31 NOTE — PROGRESS NOTES
The ABCs of the Annual Wellness Visit  Subsequent Medicare Wellness Visit    Subjective    Ángel Parikh is a 78 y.o. male who presents for a Subsequent Medicare Wellness Visit.    The following portions of the patient's history were reviewed and   updated as appropriate: allergies, current medications, past family history, past medical history, past social history, past surgical history, and problem list.    Compared to one year ago, the patient feels his physical   health is better.    Compared to one year ago, the patient feels his mental   health is better.    Recent Hospitalizations:  He was not admitted to the hospital during the last year.       Current Medical Providers:  Patient Care Team:  Perez Pompa MD as PCP - General (Family Medicine)  Reggie Scott MD as Consulting Physician (Cardiology)  Juan Almaguer MD as Cardiologist (Cardiology)    Outpatient Medications Prior to Visit   Medication Sig Dispense Refill    aspirin 81 MG EC tablet Take 1 tablet by mouth Daily. Stopped for surgery      atorvastatin (LIPITOR) 20 MG tablet Take 1 tablet by mouth Every Night. 90 tablet 3    fluticasone (FLONASE) 50 MCG/ACT nasal spray 2 sprays into the nostril(s) as directed by provider Daily.      multivitamin with minerals (MULTIVITAMIN ADULT PO) Take 1 tablet by mouth Daily.      rivaroxaban (XARELTO) 20 MG tablet Take 1 tablet by mouth Daily.      Vibegron (Gemtesa) 75 MG tablet Take 1 tablet by mouth Daily.      vitamin B-12 (CYANOCOBALAMIN) 1000 MCG tablet Take 1 tablet by mouth Daily.      doxazosin (CARDURA) 4 MG tablet Take 1 tablet by mouth Every Night. 90 tablet 3    Entresto 24-26 MG tablet Take 1 tablet by mouth 2 (Two) Times a Day. 60 tablet 5    metoprolol tartrate (LOPRESSOR) 50 MG tablet Take 0.5 tablets by mouth 2 (Two) Times a Day.      pantoprazole (PROTONIX) 40 MG EC tablet Take 1 tablet by mouth Daily.       No facility-administered medications prior to visit.       No opioid  "medication identified on active medication list. I have reviewed chart for other potential  high risk medication/s and harmful drug interactions in the elderly.        Aspirin is on active medication list. Aspirin use is indicated based on review of current medical condition/s. Pros and cons of this therapy have been discussed today. Benefits of this medication outweigh potential harm.  Patient has been encouraged to continue taking this medication.  .      Patient Active Problem List   Diagnosis    Status post placement of implantable loop recorder    Essential hypertension    HLD (hyperlipidemia)    MAE on CPAP    Iron deficiency anemia    Shortness of breath    History of malignant neoplasm of prostate    Gastroesophageal reflux disease without esophagitis    Atrial fibrillation, persistent    Chronic anticoagulation    General medical exam    Class 1 obesity due to excess calories with serious comorbidity and body mass index (BMI) of 34.0 to 34.9 in adult    Primary osteoarthritis of both knees    Hyperglycemia    Vitamin B12 deficiency    OAB (overactive bladder)     Advance Care Planning   Advance Care Planning     Advance Directive is not on file.  ACP discussion was held with the patient during this visit. Patient does not have an advance directive, information provided.     Objective    Vitals:    05/31/24 1059   BP: 138/80   BP Location: Left arm   Patient Position: Sitting   Cuff Size: Large Adult   Pulse: 55   SpO2: 97%   Weight: 101 kg (221 lb 12.8 oz)   Height: 170.2 cm (67\")     Estimated body mass index is 34.74 kg/m² as calculated from the following:    Height as of this encounter: 170.2 cm (67\").    Weight as of this encounter: 101 kg (221 lb 12.8 oz).           Does the patient have evidence of cognitive impairment? No          HEALTH RISK ASSESSMENT    Smoking Status:  Social History     Tobacco Use   Smoking Status Former    Current packs/day: 0.00    Average packs/day: 1 pack/day for 15.0 years " (15.0 ttl pk-yrs)    Types: Pipe, Cigars, Cigarettes    Start date: 1990    Quit date: 2005    Years since quittin.4    Passive exposure: Past   Smokeless Tobacco Never   Tobacco Comments    smoked 3 cigars a day and pipe off and on      Alcohol Consumption:  Social History     Substance and Sexual Activity   Alcohol Use Not Currently     Fall Risk Screen:    ROLY Fall Risk Assessment was completed, and patient is at LOW risk for falls.Assessment completed on:2024    Depression Screenin/31/2024    10:59 AM   PHQ-2/PHQ-9 Depression Screening   Little Interest or Pleasure in Doing Things 0-->not at all   Feeling Down, Depressed or Hopeless 0-->not at all   PHQ-9: Brief Depression Severity Measure Score 0       Health Habits and Functional and Cognitive Screenin/31/2024    11:01 AM   Functional & Cognitive Status   Do you have difficulty preparing food and eating? No   Do you have difficulty bathing yourself, getting dressed or grooming yourself? No   Do you have difficulty using the toilet? No   Do you have difficulty moving around from place to place? No   Do you have trouble with steps or getting out of a bed or a chair? No   Current Diet Unhealthy Diet   Dental Exam Up to date   Eye Exam Up to date   Exercise (times per week) 0 times per week   Current Exercises Include No Regular Exercise   Do you need help using the phone?  No   Are you deaf or do you have serious difficulty hearing?  Yes   Do you need help to go to places out of walking distance? No   Do you need help shopping? No   Do you need help preparing meals?  No   Do you need help with housework?  No   Do you need help with laundry? No   Do you need help taking your medications? No   Do you need help managing money? No   Do you ever drive or ride in a car without wearing a seat belt? No   Have you felt unusual stress, anger or loneliness in the last month? No   Who do you live with? Spouse   If you need help, do you  have trouble finding someone available to you? No   Have you been bothered in the last four weeks by sexual problems? No   Do you have difficulty concentrating, remembering or making decisions? No       Age-appropriate Screening Schedule:  Refer to the list below for future screening recommendations based on patient's age, sex and/or medical conditions. Orders for these recommended tests are listed in the plan section. The patient has been provided with a written plan.    Health Maintenance   Topic Date Due    ZOSTER VACCINE (1 of 2) 10/09/1996    RSV Vaccine - Adults (1 - 1-dose 60+ series) Never done    HEPATITIS C SCREENING  Never done    TDAP/TD VACCINES (2 - Td or Tdap) 05/25/2021    INFLUENZA VACCINE  08/01/2024    LIPID PANEL  08/07/2024    ANNUAL WELLNESS VISIT  05/31/2025    BMI FOLLOWUP  05/31/2025    COLORECTAL CANCER SCREENING  07/11/2027    Pneumococcal Vaccine 65+  Completed    COVID-19 Vaccine  Discontinued                  CMS Preventative Services Quick Reference  Risk Factors Identified During Encounter  Fall Risk-High or Moderate: Discussed Fall Prevention in the home  Immunizations Discussed/Encouraged: Tdap, Prevnar 20 (Pneumococcal 20-valent conjugate), Shingrix, COVID19, and RSV (Respiratory Syncytial Virus)  The above risks/problems have been discussed with the patient.  Pertinent information has been shared with the patient in the After Visit Summary.  An After Visit Summary and PPPS were made available to the patient.    Follow Up:   Next Medicare Wellness visit to be scheduled in 1 year.       Additional E&M Note during same encounter follows:  Patient has multiple medical problems which are significant and separately identifiable that require additional work above and beyond the Medicare Wellness Visit.      Chief Complaint  Establish care as new PCP    Subjective        HPI  Ángel Parikh is also being seen today for visit to establish care as his new PCP given his past PCP is no longer  with our practice.    History of hypertension, hyperlipidemia, GERD, atrial fibrillation status post ablation, with heart failure, and overactive bladder and urinary symptoms following prostate cancer treatment.    He is on low-dose aspirin, atorvastatin, doxazosin, Entresto, metoprolol, and Xarelto for history of hypertension, hyperlipidemia, atrial fibrillation with congestive heart failure.    He does continue to follow-up with cardiology (Dr. Jay and Dr. Scott).  At his last visit with cardiology his metoprolol was reduced to 25 mg twice a day given problems with bradycardia.  He is feeling better with reduced dosing of metoprolol and heart rate has been in the 50s to 60s.    He does have sleep apnea and is using his CPAP.    His most recent echocardiogram did show normal left ventricular systolic function 66 to 70%, mild enlargement of left atrium, moderate mitral valve regurgitation, elevated right ventricular systolic pressure 35 to 45 mmHg and he is on treatment for sleep apnea, and he additionally had mild aortic valve regurgitation.  No heart failure seen on most recent echo after atrial fibrillation ablation but he does continue on Entresto per cardiology.    GERD doing well on Protonix.  No dysphagia.    He has followed with urology given history of prostate cancer and urinary frequency and overactive bladder symptoms and is on Gemtesa.    Continues on B12 given history of B12 deficiency.    Discussed and encouraged vaccination update today with COVID booster, Tdap, Prevnar 20, RSV, and Shingrix.  He is willing to get Prevnar 20 today and will consider other vaccination updates at his pharmacy.    Encouraged hepatitis C screening with fasting labs and he is willing to get this done.    Colonoscopy up-to-date July 11, 2022 with removal of 1 polyp and recommendation to repeat in 5 years (July 2027).    Review of Systems   Constitutional:  Negative for activity change, appetite change, chills, fatigue  "and fever.   HENT:  Negative for ear pain, hearing loss and trouble swallowing.    Eyes:  Negative for pain and visual disturbance.   Respiratory:  Negative for cough, chest tightness, shortness of breath and wheezing.    Cardiovascular:  Negative for chest pain, palpitations and leg swelling.   Gastrointestinal:  Negative for abdominal pain and blood in stool.   Genitourinary:         See HPI   Musculoskeletal:  Negative for arthralgias, back pain and joint swelling.   Skin:  Negative for rash.   Neurological:  Negative for dizziness, weakness and light-headedness.   Psychiatric/Behavioral:  Negative for agitation, behavioral problems, dysphoric mood and sleep disturbance.        Objective   Vital Signs:  /80 (BP Location: Left arm, Patient Position: Sitting, Cuff Size: Large Adult)   Pulse 55   Ht 170.2 cm (67\")   Wt 101 kg (221 lb 12.8 oz)   SpO2 97%   BMI 34.74 kg/m²     Physical Exam  Constitutional:       Appearance: He is obese.   HENT:      Head: Normocephalic.      Right Ear: External ear normal.      Left Ear: External ear normal.      Nose: Nose normal.   Eyes:      Pupils: Pupils are equal, round, and reactive to light.   Cardiovascular:      Rate and Rhythm: Normal rate and regular rhythm.      Heart sounds: Normal heart sounds.   Pulmonary:      Effort: Pulmonary effort is normal.      Breath sounds: Normal breath sounds.   Musculoskeletal:         General: Normal range of motion.      Cervical back: Normal range of motion.   Skin:     General: Skin is warm and dry.   Neurological:      General: No focal deficit present.      Mental Status: He is alert.   Psychiatric:         Mood and Affect: Mood normal.         Behavior: Behavior normal.         Thought Content: Thought content normal.                         Assessment and Plan   Diagnoses and all orders for this visit:    1. General medical exam (Primary)  Assessment & Plan:  Discussed together health maintenance and screening along with " vaccination options and healthy diet and exercise habits as part of the preventative counseling at their physical exam today.     Updated vaccination with Prevnar 20.    Encouraged Tdap, RSV, and Shingrix at his pharmacy.  Declines COVID booster      2. Need for prophylactic vaccination against Streptococcus pneumoniae (pneumococcus)  -     Pneumococcal Conjugate Vaccine 20-Valent (PCV20)    3. Need for hepatitis C screening test  -     HCV Antibody Rfx To Qnt PCR; Future    4. Atrial fibrillation, persistent  Assessment & Plan:  Continue metoprolol along with Xarelto.  Doing well after ablation.  Ongoing cardiology follow-up    Orders:  -     Entresto 24-26 MG tablet; Take 1 tablet by mouth 2 (Two) Times a Day.  Dispense: 180 tablet; Refill: 2  -     metoprolol tartrate (LOPRESSOR) 25 MG tablet; Take 1 tablet by mouth 2 (Two) Times a Day.  Dispense: 180 tablet; Refill: 2    5. Chronic anticoagulation    6. Essential hypertension  Assessment & Plan:  Hypertension is stable and controlled  Continue current treatment regimen.  Weight loss.  Regular aerobic exercise.  Blood pressure will be reassessed in 6 months.    Orders:  -     doxazosin (CARDURA) 4 MG tablet; Take 1 tablet by mouth Every Night.  Dispense: 90 tablet; Refill: 2  -     Entresto 24-26 MG tablet; Take 1 tablet by mouth 2 (Two) Times a Day.  Dispense: 180 tablet; Refill: 2  -     metoprolol tartrate (LOPRESSOR) 25 MG tablet; Take 1 tablet by mouth 2 (Two) Times a Day.  Dispense: 180 tablet; Refill: 2  -     CBC Auto Differential; Future  -     Comprehensive Metabolic Panel; Future  -     Lipid Panel; Future  -     TSH; Future  -     T4, Free; Future    7. Gastroesophageal reflux disease without esophagitis  Assessment & Plan:  Continue pantoprazole.  No dysphagia.  Refilled    Orders:  -     pantoprazole (PROTONIX) 40 MG EC tablet; Take 1 tablet by mouth Daily.  Dispense: 90 tablet; Refill: 2    8. Hyperlipidemia, unspecified hyperlipidemia  type  Assessment & Plan:   Lipid abnormalities are improving with treatment    Plan:  Continue same medication/s without change.      Discussed medication dosage, use, side effects, and goals of treatment in detail.    Counseled patient on lifestyle modifications to help control hyperlipidemia.     Patient Treatment Goals:   LDL goal is under 100    Followup at the next regular appointment.    Orders:  -     CBC Auto Differential; Future  -     Comprehensive Metabolic Panel; Future  -     Lipid Panel; Future  -     TSH; Future  -     T4, Free; Future    9. History of malignant neoplasm of prostate  Assessment & Plan:  Continue to follow-up with urology he does continue with surveillance PSA monitoring with his urologist.      10. MAE on CPAP  Assessment & Plan:  Continue CPAP      11. Shortness of breath  Assessment & Plan:  Doing well with current regimen.  Continue current treatment regimen with ongoing cardiology follow-up      12. Status post placement of implantable loop recorder    13. OAB (overactive bladder)  Assessment & Plan:  Continue Gemtesa with ongoing follow-up with urology      14. Other iron deficiency anemia  Assessment & Plan:  Followup ongoing with Dr Chappell    On iron otc at this point    Orders:  -     Ferritin; Future  -     Iron Profile; Future    15. Vitamin B12 deficiency  Assessment & Plan:  Continue B12    Orders:  -     Vitamin B12; Future    16. Hyperglycemia  -     Hemoglobin A1c; Future    17. Primary osteoarthritis of both knees  Assessment & Plan:  He does plan for knee replacement surgery later this year and is using Tylenol as needed.  We did discuss that with the Xarelto for atrial fibrillation he would not want to take any NSAIDs due to increased bleeding risk      18. Class 1 obesity due to excess calories with serious comorbidity and body mass index (BMI) of 34.0 to 34.9 in adult  Assessment & Plan:  Patient's (Body mass index is 34.74 kg/m².) indicates that they are obese  (BMI >30) with health conditions that include obstructive sleep apnea, hypertension, coronary heart disease, dyslipidemias, GERD, and osteoarthritis . Weight is unchanged. BMI  is above average; BMI management plan is completed. We discussed portion control and increasing exercise.                Follow Up   Return in about 6 months (around 11/30/2024) for Med recheck.  Patient was given instructions and counseling regarding his condition or for health maintenance advice. Please see specific information pulled into the AVS if appropriate.

## 2024-05-31 NOTE — ASSESSMENT & PLAN NOTE
Patient's (Body mass index is 34.74 kg/m².) indicates that they are obese (BMI >30) with health conditions that include obstructive sleep apnea, hypertension, coronary heart disease, dyslipidemias, GERD, and osteoarthritis . Weight is unchanged. BMI  is above average; BMI management plan is completed. We discussed portion control and increasing exercise.

## 2024-05-31 NOTE — ASSESSMENT & PLAN NOTE
Hypertension is stable and controlled  Continue current treatment regimen.  Weight loss.  Regular aerobic exercise.  Blood pressure will be reassessed in 6 months.

## 2024-05-31 NOTE — ASSESSMENT & PLAN NOTE
Doing well with current regimen.  Continue current treatment regimen with ongoing cardiology follow-up   none

## 2024-05-31 NOTE — ASSESSMENT & PLAN NOTE
Discussed together health maintenance and screening along with vaccination options and healthy diet and exercise habits as part of the preventative counseling at their physical exam today.     Updated vaccination with Prevnar 20.    Encouraged Tdap, RSV, and Shingrix at his pharmacy.  Declines COVID booster

## 2024-06-06 ENCOUNTER — TELEPHONE (OUTPATIENT)
Dept: CARDIOLOGY | Facility: CLINIC | Age: 79
End: 2024-06-06
Payer: MEDICARE

## 2024-06-06 NOTE — TELEPHONE ENCOUNTER
Per [employer] loop recorder transmission received today, 6/6/2024:     A 4 second pause noted on 6/5/2024 @ 3:20 PM. Pt denies napping at the time of the episode & also denies symptomatology.

## 2024-07-10 ENCOUNTER — OFFICE VISIT (OUTPATIENT)
Dept: ENDOCRINOLOGY | Facility: CLINIC | Age: 79
End: 2024-07-10
Payer: MEDICARE

## 2024-07-10 VITALS
WEIGHT: 224.5 LBS | OXYGEN SATURATION: 96 % | SYSTOLIC BLOOD PRESSURE: 142 MMHG | DIASTOLIC BLOOD PRESSURE: 82 MMHG | HEART RATE: 58 BPM | HEIGHT: 67 IN | BODY MASS INDEX: 35.24 KG/M2

## 2024-07-10 DIAGNOSIS — E05.90 HYPERTHYROIDISM: Primary | ICD-10-CM

## 2024-07-10 NOTE — ASSESSMENT & PLAN NOTE
Low TSH with normal T4 on labs from 1/2024.  Clinically euthyroid; hx a fib.   Possible small approximately <1 cm nodule right thyroid - consider toxic adenoma.  Ddx: Grave's disease, thyroiditis, pituitary disease.   Repeat TFT, TSI. Discussed uptake scan as needed.   Discussed complications of uncontrolled hyperthyroidism including goiter, arrhythmia, CHF, weight loss.  Discussed treatment options as needed: medication/methimazole, ablation, surgery.  Cautioned potential side effects with medication: hepatitis, abnormal WBC, GI upset, allergy.

## 2024-07-10 NOTE — PROGRESS NOTES
Chief Complaint   Patient presents with    Abnormal Lab        HPI  Ángel Parikh is a 78 y.o. male presents today for evaluation of hyperthyroidism. Referring Provider: No ref. provider found.     Without concerns today. Admits chronic fatigue that is unchanged. Admits intermittent difficulty swallowing water x 1 year. Denies hoarseness. Denies changes in weight, weakness, heat/cold intolerance, chest pain, palpitations, tremor, abdominal pain, diarrhea, constipation, insomnia, anxiety/depression, vision changes, changes in hair/skin/nails.     Past medical history: Hypertension, hyperlipidemia, A-fib, MAE, obesity, GERD, hx prostate cancer s/p prostatectomy     Hyperthyroidism/Low TSH:  Initially told abnormal labs earlier this year.     Labs from  1/10/2024 TSH: 0.006, T4: 10.6  6/1/2022 TSH: 4.680    - Denies hx amiodarone/digoxin use.   - Denies history of thyroid ultrasound, uptake scan, thyroidectomy.  - Unknown family history of thyroid disease or thyroid cancer.   - Denies history of radiation to head/neck  - Denies taking biotin supplement. Taking multivitamin.   - Denies high iodine diet.     Past Medical History:   Diagnosis Date    Anemia 11/22/2005    Arthritis     multi joint jose left knee     Cancer     prostate per elevated PSA and biopsies     CHF (congestive heart failure)     Colonic polyp     Elevated PSA     Encounter for screening colonoscopy     Essential hypertension     Gastroesophageal reflux disease without esophagitis     H/O valvular heart disease     3 leaky valves- monitored by [facility]MD and Dr Scott (sees every 6 months)    Hearing loss     hearing aids in both ears    Heart murmur     Hemorrhoid     bleeds on occasion     Hiatal hernia     High risk medication use     History of cellulitis     History of tobacco use     QUIT AT AGE 51    Hypercholesterolemia     Hyperglycemia     Hyperlipidemia     Morbid obesity     Overactive bladder     Psoriasis     spots on legs     Restless leg  syndrome     Sleep apnea with use of continuous positive airway pressure (CPAP)     10 auto setting    Visual impairment     Vitamin B12 deficiency     Vitamin D deficiency     Wears glasses     Wears partial dentures      Past Surgical History:   Procedure Laterality Date    CARDIAC CATHETERIZATION N/A 2021    Procedure: Left Heart Cath 08582;  Surgeon: Reggie Scott MD;  Location:  [facility];  Service: Cardiovascular;  Laterality: N/A;    CARDIAC ELECTROPHYSIOLOGY PROCEDURE N/A 2024    Procedure: Ablation atrial fibrillation (PFA). DNS Xarelto. Hold Flecainide 3 days prior.;  Surgeon: Luis Atwood DO;  Location:  [facility];  Service: Cardiovascular;  Laterality: N/A;    COLONOSCOPY  11/10/2015    MOD SIGMOID DIVERTICULOSIS, NO POLYPS. PLAN: FIBER, REPEAT IN 10 YEARS.    COLONOSCOPY  2005    DIVERTICULOSIS, INT HEMERRHOIDS. NO POLYPS. EGD- REFLUX, NO BLEEDING. FOLLOW UP CAPSULE ENDOSCOPY AT  -NORMAL    CYSTOSCOPY AND NEEDLE BIOPSY PROSTATE      twice; last time     PROSTATECTOMY N/A 10/03/2017    Procedure: PROSTATECTOMY LAPAROSCOPIC WITH DAVINCI ROBOT;  Surgeon: Jameel Ferguson Jr., MD;  Location:  [facility];  Service:       Family History   Problem Relation Age of Onset    Stroke Mother     Hypertension Mother     Heart attack Father     Hypertension Father     Cirrhosis Brother       Social History     Socioeconomic History    Marital status:    Tobacco Use    Smoking status: Former     Current packs/day: 0.00     Average packs/day: 1 pack/day for 15.0 years (15.0 ttl pk-yrs)     Types: Pipe, Cigars, Cigarettes     Start date: 1990     Quit date: 2005     Years since quittin.5     Passive exposure: Past    Smokeless tobacco: Never    Tobacco comments:     smoked 3 cigars a day and pipe off and on    Vaping Use    Vaping status: Never Used    Passive vaping exposure: Yes   Substance and Sexual Activity    Alcohol  use: Not Currently    Drug use: Never    Sexual activity: Not Currently     Partners: Female      Allergies   Allergen Reactions    Levofloxacin Itching and Rash    Meloxicam Swelling    Percocet [Oxycodone-Acetaminophen] Rash     Chest and back red      Current Outpatient Medications on File Prior to Visit   Medication Sig Dispense Refill    aspirin 81 MG EC tablet Take 1 tablet by mouth Daily. Stopped for surgery      atorvastatin (LIPITOR) 20 MG tablet Take 1 tablet by mouth Every Night. 90 tablet 3    doxazosin (CARDURA) 4 MG tablet Take 1 tablet by mouth Every Night. 90 tablet 2    Entresto 24-26 MG tablet Take 1 tablet by mouth 2 (Two) Times a Day. 180 tablet 2    fluticasone (FLONASE) 50 MCG/ACT nasal spray 2 sprays into the nostril(s) as directed by provider Daily.      metoprolol tartrate (LOPRESSOR) 25 MG tablet Take 1 tablet by mouth 2 (Two) Times a Day. 180 tablet 2    multivitamin with minerals (MULTIVITAMIN ADULT PO) Take 1 tablet by mouth Daily.      pantoprazole (PROTONIX) 40 MG EC tablet Take 1 tablet by mouth Daily. 90 tablet 2    rivaroxaban (XARELTO) 20 MG tablet Take 1 tablet by mouth Daily.      Vibegron (Gemtesa) 75 MG tablet Take 1 tablet by mouth Daily.      vitamin B-12 (CYANOCOBALAMIN) 1000 MCG tablet Take 1 tablet by mouth Daily.       No current facility-administered medications on file prior to visit.        Review of Systems   Constitutional:  Positive for fatigue. Negative for activity change, appetite change, unexpected weight gain and unexpected weight loss.   HENT:  Positive for trouble swallowing. Negative for voice change.    Respiratory:  Negative for shortness of breath.    Cardiovascular:  Negative for chest pain and palpitations.   Gastrointestinal:  Negative for abdominal pain, constipation and diarrhea.   Endocrine: Negative for cold intolerance and heat intolerance.   Musculoskeletal:  Negative for myalgias.   Skin:  Negative for dry skin.   Neurological:  Negative for  "dizziness, tremors, numbness and confusion.   Psychiatric/Behavioral:  Negative for depressed mood and stress. The patient is not nervous/anxious.         /82 (BP Location: Right arm, Patient Position: Sitting, Cuff Size: Adult)   Pulse 58   Ht 170.2 cm (67\")   Wt 102 kg (224 lb 8 oz)   SpO2 96%   BMI 35.16 kg/m² Body mass index is 35.16 kg/m².    Physical Exam  Constitutional:       Appearance: Normal appearance.   Eyes:      Extraocular Movements: Extraocular movements intact.   Neck:      Thyroid: Thyroid mass (questionable small <1 cm mass right thyroid) present. No thyromegaly or thyroid tenderness.   Cardiovascular:      Rate and Rhythm: Normal rate.   Pulmonary:      Effort: Pulmonary effort is normal.   Musculoskeletal:         General: Normal range of motion.   Skin:     General: Skin is warm and dry.   Neurological:      General: No focal deficit present.      Mental Status: He is alert and oriented to person, place, and time.   Psychiatric:         Mood and Affect: Mood normal.         Behavior: Behavior normal.         LABS AND IMAGING    CMP  Lab Results   Component Value Date    GLUCOSE 146 (H) 01/31/2024    BUN 19 01/31/2024    CREATININE 0.87 01/31/2024    EGFR 88.9 01/31/2024    BCR 21.8 01/31/2024    K 4.2 01/31/2024    CO2 23.0 01/31/2024    CALCIUM 8.8 01/31/2024    ALBUMIN 4.4 08/07/2023    AST 19 08/07/2023    ALT 13 08/07/2023        CBC w/DIFF   Lab Results   Component Value Date    WBC 8.60 01/31/2024    RBC 4.11 (L) 01/31/2024    HGB 12.4 (L) 01/31/2024    HCT 37.8 01/31/2024    MCV 92.0 01/31/2024    MCH 30.2 01/31/2024    MCHC 32.8 01/31/2024    RDW 14.8 01/31/2024    RDWSD 49.6 01/31/2024    MPV 10.1 01/31/2024     01/31/2024    NEUTRORELPCT 62 02/06/2023    LYMPHORELPCT 22 02/06/2023    MONORELPCT 8 02/06/2023    EOSRELPCT 7 02/06/2023    BASORELPCT 1 02/06/2023    AUTOIGPER 0.2 10/26/2022    NEUTROABS 3.5 02/06/2023    LYMPHSABS 1.3 02/06/2023    MONOSABS 0.5 " 02/06/2023    EOSABS 0.4 02/06/2023    BASOSABS 0.0 02/06/2023    AUTOIGNUM 0.01 10/26/2022    NRBC 0.0 10/26/2022       TSH  Lab Results   Component Value Date    TSH 4.680 (H) 06/01/2022       No valid procedures specified.    Assessment and Plan    Diagnoses and all orders for this visit:    1. Hyperthyroidism (Primary)  Assessment & Plan:  Low TSH with normal T4 on labs from 1/2024.  Clinically euthyroid; hx a fib.   Possible small approximately <1 cm nodule right thyroid - consider toxic adenoma.  Ddx: Grave's disease, thyroiditis, pituitary disease.   Repeat TFT, TSI. Discussed uptake scan as needed.   Discussed complications of uncontrolled hyperthyroidism including goiter, arrhythmia, CHF, weight loss.  Discussed treatment options as needed: medication/methimazole, ablation, surgery.  Cautioned potential side effects with medication: hepatitis, abnormal WBC, GI upset, allergy.        Orders:  -     T3  -     T4, Free  -     TSH  -     Thyroid Stimulating Immunoglobulin         Return in about 6 weeks (around 8/21/2024) for follow-up thyroid disease. The patient was instructed to contact the clinic with any interval questions or concerns.    Electronically signed by: Jennifer Hernandez PA-C   Endocrinology     Please note that portions of this note were completed with a voice recognition program.

## 2024-07-13 LAB
T3 SERPL-MCNC: 153 NG/DL (ref 71–180)
T4 FREE SERPL-MCNC: 1.24 NG/DL (ref 0.82–1.77)
TSH SERPL DL<=0.005 MIU/L-ACNC: 5.06 UIU/ML (ref 0.45–4.5)
TSI SER-ACNC: <0.1 IU/L (ref 0–0.55)

## 2024-07-31 ENCOUNTER — TELEPHONE (OUTPATIENT)
Dept: ORTHOPEDIC SURGERY | Facility: CLINIC | Age: 79
End: 2024-07-31

## 2024-07-31 NOTE — TELEPHONE ENCOUNTER
Provider: EBONY    Caller: DONAVON GUERRIER    Relationship to Patient: SELF    Phone Number: [phone]    Reason for Call: PT WANTING TO SPEAK WITH SOMEONE REGARDING SCHEDULING LEFT KNEE REPLACEMENT.

## 2024-08-05 ENCOUNTER — OFFICE VISIT (OUTPATIENT)
Dept: CARDIOLOGY | Facility: CLINIC | Age: 79
End: 2024-08-05
Payer: MEDICARE

## 2024-08-05 VITALS
HEIGHT: 67 IN | DIASTOLIC BLOOD PRESSURE: 70 MMHG | OXYGEN SATURATION: 97 % | BODY MASS INDEX: 35.16 KG/M2 | WEIGHT: 224 LBS | HEART RATE: 61 BPM | SYSTOLIC BLOOD PRESSURE: 130 MMHG

## 2024-08-05 VITALS
HEIGHT: 67 IN | SYSTOLIC BLOOD PRESSURE: 132 MMHG | OXYGEN SATURATION: 96 % | HEART RATE: 61 BPM | RESPIRATION RATE: 18 BRPM | DIASTOLIC BLOOD PRESSURE: 84 MMHG | WEIGHT: 227.4 LBS | BODY MASS INDEX: 35.69 KG/M2

## 2024-08-05 DIAGNOSIS — I48.11 LONGSTANDING PERSISTENT ATRIAL FIBRILLATION: ICD-10-CM

## 2024-08-05 DIAGNOSIS — I48.19 ATRIAL FIBRILLATION, PERSISTENT: Primary | ICD-10-CM

## 2024-08-05 DIAGNOSIS — Z79.899 LONG TERM CURRENT USE OF ANTIARRHYTHMIC DRUG: ICD-10-CM

## 2024-08-05 DIAGNOSIS — E78.2 MIXED HYPERLIPIDEMIA: ICD-10-CM

## 2024-08-05 DIAGNOSIS — Z95.818 STATUS POST PLACEMENT OF IMPLANTABLE LOOP RECORDER: ICD-10-CM

## 2024-08-05 DIAGNOSIS — I50.32 CHRONIC HEART FAILURE WITH PRESERVED EJECTION FRACTION (HFPEF): ICD-10-CM

## 2024-08-05 DIAGNOSIS — I10 ESSENTIAL HYPERTENSION: ICD-10-CM

## 2024-08-05 DIAGNOSIS — Z79.01 CHRONIC ANTICOAGULATION: ICD-10-CM

## 2024-08-05 PROCEDURE — 1159F MED LIST DOCD IN RCRD: CPT | Performed by: INTERNAL MEDICINE

## 2024-08-05 PROCEDURE — 1160F RVW MEDS BY RX/DR IN RCRD: CPT | Performed by: INTERNAL MEDICINE

## 2024-08-05 PROCEDURE — 3078F DIAST BP <80 MM HG: CPT | Performed by: INTERNAL MEDICINE

## 2024-08-05 PROCEDURE — G2211 COMPLEX E/M VISIT ADD ON: HCPCS | Performed by: INTERNAL MEDICINE

## 2024-08-05 PROCEDURE — 3075F SYST BP GE 130 - 139MM HG: CPT | Performed by: INTERNAL MEDICINE

## 2024-08-05 PROCEDURE — 99214 OFFICE O/P EST MOD 30 MIN: CPT | Performed by: INTERNAL MEDICINE

## 2024-08-05 PROCEDURE — 3079F DIAST BP 80-89 MM HG: CPT | Performed by: INTERNAL MEDICINE

## 2024-08-05 NOTE — ASSESSMENT & PLAN NOTE
[employer].  Loop recorder check today to patient's home monitor for rate control in the setting of atrial fibrillation.

## 2024-08-05 NOTE — PROGRESS NOTES
Cardiac Electrophysiology Outpatient Follow Up Note            [facility]    Follow Up Office Visit      Ángel Parikh  [id number]  08/05/2024  [unfilled]  [unfilled]    Primary Care Physician: Perez Pompa MD    Referred By: No ref. provider found    Subjective     Chief Complaint:     Chief Complaint   Patient presents with    Atrial fibrillation, persistent       History of Present Illness:   Ángel Parikh is a 78 y.o. male who presents to my electrophysiology clinic for follow up of above complaints.      Past Medical History:   Past Medical History:   Diagnosis Date    Anemia 11/22/2005    Arthritis     multi joint jose left knee     Cancer     prostate per elevated PSA and biopsies     CHF (congestive heart failure)     Colonic polyp     Elevated PSA     Encounter for screening colonoscopy     Essential hypertension     Gastroesophageal reflux disease without esophagitis     H/O valvular heart disease     3 leaky valves- monitored by [facility] and Dr Scott (sees every 6 months)    Hearing loss     hearing aids in both ears    Heart murmur     Hemorrhoid     bleeds on occasion     Hiatal hernia     High risk medication use     History of cellulitis     History of tobacco use     QUIT AT AGE 51    Hypercholesterolemia     Hyperglycemia     Hyperlipidemia     Morbid obesity     Overactive bladder     Psoriasis     spots on legs     Restless leg syndrome     Sleep apnea with use of continuous positive airway pressure (CPAP)     10 auto setting    Visual impairment     Vitamin B12 deficiency     Vitamin D deficiency     Wears glasses     Wears partial dentures        Past Surgical History:   Past Surgical History:   Procedure Laterality Date    CARDIAC CATHETERIZATION N/A 01/08/2021    Procedure: Left Heart Cath 72380;  Surgeon: Reggie Scott MD;  Location: [facility] INVASIVE LOCATION;  Service: Cardiovascular;  Laterality: N/A;    CARDIAC  ELECTROPHYSIOLOGY PROCEDURE N/A 2024    Procedure: Ablation atrial fibrillation (PFA). DNS Xarelto. Hold Flecainide 3 days prior.;  Surgeon: Luis Atwood DO;  Location:  [facility];  Service: Cardiovascular;  Laterality: N/A;    COLONOSCOPY  11/10/2015    MOD SIGMOID DIVERTICULOSIS, NO POLYPS. PLAN: FIBER, REPEAT IN 10 YEARS.    COLONOSCOPY  2005    DIVERTICULOSIS, INT HEMERRHOIDS. NO POLYPS. EGD- REFLUX, NO BLEEDING. FOLLOW UP CAPSULE ENDOSCOPY AT  -NORMAL    CYSTOSCOPY AND NEEDLE BIOPSY PROSTATE      twice; last time     PROSTATECTOMY N/A 10/03/2017    Procedure: PROSTATECTOMY LAPAROSCOPIC WITH DAVINCI ROBOT;  Surgeon: Jameel Ferguson Jr., MD;  Location: [facility] OR;  Service:        Family History:   Family History   Problem Relation Age of Onset    Stroke Mother     Hypertension Mother     Heart attack Father     Hypertension Father     Cirrhosis Brother        Social History:   Social History     Socioeconomic History    Marital status:    Tobacco Use    Smoking status: Former     Current packs/day: 0.00     Average packs/day: 1 pack/day for 15.0 years (15.0 ttl pk-yrs)     Types: Pipe, Cigars, Cigarettes     Start date: 1990     Quit date: 2005     Years since quittin.6     Passive exposure: Past    Smokeless tobacco: Never    Tobacco comments:     smoked 3 cigars a day and pipe off and on    Vaping Use    Vaping status: Never Used    Passive vaping exposure: Yes   Substance and Sexual Activity    Alcohol use: Not Currently    Drug use: Never    Sexual activity: Not Currently     Partners: Female       Medications:     Current Outpatient Medications:     aspirin 81 MG EC tablet, Take 1 tablet by mouth Daily. Stopped for surgery, Disp: , Rfl:     atorvastatin (LIPITOR) 20 MG tablet, Take 1 tablet by mouth Every Night., Disp: 90 tablet, Rfl: 3    doxazosin (CARDURA) 4 MG tablet, Take 1 tablet by mouth Every Night., Disp: 90 tablet, Rfl: 2    Entresto  "24-26 MG tablet, Take 1 tablet by mouth 2 (Two) Times a Day., Disp: 180 tablet, Rfl: 2    fluticasone (FLONASE) 50 MCG/ACT nasal spray, 2 sprays into the nostril(s) as directed by provider Daily., Disp: , Rfl:     metoprolol tartrate (LOPRESSOR) 25 MG tablet, Take 1 tablet by mouth 2 (Two) Times a Day., Disp: 180 tablet, Rfl: 2    multivitamin with minerals (MULTIVITAMIN ADULT PO), Take 1 tablet by mouth Daily., Disp: , Rfl:     pantoprazole (PROTONIX) 40 MG EC tablet, Take 1 tablet by mouth Daily., Disp: 90 tablet, Rfl: 2    rivaroxaban (XARELTO) 20 MG tablet, Take 1 tablet by mouth Daily., Disp: 90 tablet, Rfl: 2    Vibegron (Gemtesa) 75 MG tablet, Take 1 tablet by mouth Daily., Disp: , Rfl:     vitamin B-12 (CYANOCOBALAMIN) 1000 MCG tablet, Take 1 tablet by mouth Daily., Disp: , Rfl:     Allergies:   Allergies   Allergen Reactions    Levofloxacin Itching and Rash    Meloxicam Swelling    Percocet [Oxycodone-Acetaminophen] Rash     Chest and back red       Objective   Vital Signs:   Vitals:    08/05/24 1104   BP: 130/70   BP Location: Left arm   Patient Position: Sitting   Cuff Size: Adult   Pulse: 61   SpO2: 97%   Weight: 102 kg (224 lb)   Height: 170.2 cm (67.01\")       PHYSICAL EXAM  General appearance: Awake, alert, cooperative  Head: Normocephalic, without obvious abnormality, atraumatic  Eyes: Conjunctivae/corneas clear, EOMs intact  Neck: no adenopathy, no carotid bruit, no JVD, and thyroid: not enlarged  Lungs: clear to auscultation bilaterally and no rhonchi or crackles\", ' symmetric  Heart: regular rate and rhythm, S1, S2 normal, no murmur, click, rub or gallop  Abdomen: Soft, non-tender, bowel sounds normal,  no organomegaly  Extremities: extremities normal, atraumatic, no cyanosis or edema  Skin: Skin color, turgor normal, no rashes or lesions  Neurologic: Grossly normal     Lab Results   Component Value Date    GLUCOSE 146 (H) 01/31/2024    CALCIUM 8.8 01/31/2024     01/31/2024    K 4.2 " 01/31/2024    CO2 23.0 01/31/2024     01/31/2024    BUN 19 01/31/2024    CREATININE 0.87 01/31/2024    EGFRIFNONA 75 11/10/2021    BCR 21.8 01/31/2024    ANIONGAP 12.0 01/31/2024     Lab Results   Component Value Date    WBC 8.60 01/31/2024    HGB 12.4 (L) 01/31/2024    HCT 37.8 01/31/2024    MCV 92.0 01/31/2024     01/31/2024     Lab Results   Component Value Date    INR 0.98 09/29/2017    PROTIME 10.7 09/29/2017     Lab Results   Component Value Date    TSH 5.060 (H) 07/10/2024    B0JNUMX 153 07/10/2024       Cardiac Testing:     I personally viewed and interpreted the patient's EKG/Telemetry/lab data    Procedures    Tobacco Cessation: N/A  Obstructive Sleep Apnea Screening: Completed    Advance Care Planning   ACP discussion was declined by the patient. Patient does not have an advance directive, declines further assistance.       Assessment & Plan         Diagnosis Plan   1. Atrial fibrillation, persistent  Rhythm control.  PVA Jan 2024:    Normal AV node and His-Purkinje system function  Significant left atrial enlargement however normal left atrial pressure, moderate amount of left atrial myopathy  Successful wide antral circumferential pulmonary and isolation isolation of the entire left atrial posterior wall as well as typical flutter ablation  Loop monitor implantation    NSR only since as demonstrated by ILR.      2. Status post placement of implantable loop recorder  Stable.    Normal interrogation.      3. Long term current use of antiarrhythmic drug  None.      4. Chronic anticoagulation  Lifelong.  Primary prevention of stroke.     Body mass index is 35.07 kg/m².    I spent 37 minutes in consultation with this patient which included more than 65% of this time in direct face-to-face counseling, physical examination and discussion of my assessment and findings and this shared decision making with the patient.  The remainder of the time not spent face-to-face was performing one, some or all  of the following actions: preparing to see the patient (e.g. reviewing tests, prior clinicians' notes, etc), ordering medications, tests or procedures, coordination of care, discussion of the plan with other healthcare providers, documenting clinical information in epic as well as independently interpreting results and communication of these results to the patient family and/or caregiver(s).  Please note that this explicitly excludes time spent on other separate billable services such as performing procedures or test interpretation, when applicable.      Follow Up:       Thank you for allowing me to participate in the care of your patient. Please to not hesitate to contact me with additional questions or concerns.      Luis Atwood, DO, FACC, FHRS  Cardiac Electrophysiologist  [facility] / [facility]

## 2024-08-05 NOTE — PROGRESS NOTES
[facility] [place]  [facility] CARDIOLOGY  [address] [place] [address]  Dept: [phone]  Dept Fax: [phone]    Date: 08/05/2024  Patient: Ángel Parikh  YOB: 1945    Follow Up Office Visit Note    Interval Follow-up  Mr. Ángel Parkih is a 78 y.o. male who is here for follow-up on Atrial Fibrillation.    Subjective   Patient doing well with no complaints.  Patient denies angina, orthopnea, PND, palpitations, lightheadedness, syncope or medications side-effects.      The following portions of the patient's history were reviewed and updated as appropriate: allergies, current medications, past family history, past medical history, past social history, past surgical history, and problem list.    Medications:   Allergies   Allergen Reactions    Levofloxacin Itching and Rash    Meloxicam Swelling    Percocet [Oxycodone-Acetaminophen] Rash     Chest and back red      Current Outpatient Medications   Medication Instructions    aspirin 81 mg, Oral, Daily, Stopped for surgery     atorvastatin (LIPITOR) 20 mg, Oral, Nightly    doxazosin (CARDURA) 4 mg, Oral, Nightly    Entresto 24-26 MG tablet 1 tablet, Oral, 2 Times Daily    fluticasone (FLONASE) 50 MCG/ACT nasal spray 2 sprays, Nasal, Daily    metoprolol tartrate (LOPRESSOR) 25 mg, Oral, 2 Times Daily    multivitamin with minerals (MULTIVITAMIN ADULT PO) 1 tablet, Oral, Daily    pantoprazole (PROTONIX) 40 mg, Oral, Daily    rivaroxaban (XARELTO) 20 mg, Oral, Daily    Vibegron (Gemtesa) 75 MG tablet 1 each, Oral, Daily    vitamin B-12 (CYANOCOBALAMIN) 1,000 mcg, Oral, Daily       Tobacco Use: Medium Risk (8/5/2024)    Patient History     Smoking Tobacco Use: Former     Smokeless Tobacco Use: Never     Passive Exposure: Past        Objective  Vitals:    08/05/24 1419   BP: 132/84   BP Location: Right arm   Patient Position: Sitting   Pulse: 61   Resp: 18   SpO2: 96%   Weight: 103 kg (227 lb 6.4 oz)   Height: 170.2 cm  "(67.01\")   PainSc: 0-No pain      Vitals:    08/05/24 1419   BP: 132/84   BP Location: Right arm   Patient Position: Sitting   Pulse: 61   Resp: 18   SpO2: 96%   Weight: 103 kg (227 lb 6.4 oz)   Height: 170.2 cm (67.01\")          Physical Exam  Constitutional:       Appearance: Healthy appearance. Not in distress.   Eyes:      Pupils: Pupils are equal, round, and reactive to light.   Neck:      Vascular: No JVR. JVD normal.   Pulmonary:      Effort: Pulmonary effort is normal.      Breath sounds: Normal breath sounds. No wheezing. No rhonchi. No rales.   Chest:      Chest wall: Not tender to palpatation.   Cardiovascular:      PMI at left midclavicular line. Normal rate. Regular rhythm. Normal S1 with normal intensity. Normal S2 with normal intensity.       Murmurs: There is no murmur.      No gallop.  No click. No rub.   Pulses:     Intact distal pulses.   Edema:     Peripheral edema absent.   Abdominal:      General: There is no abdominal bruit.   Skin:     General: Skin is warm.   Neurological:      Mental Status: Alert and oriented to person, place and time.              Diagnostic Data  Lab Results   Component Value Date     01/31/2024    K 4.2 01/31/2024     01/31/2024    CO2 23.0 01/31/2024    BUN 19 01/31/2024    CREATININE 0.87 01/31/2024    CALCIUM 8.8 01/31/2024    BILITOT 0.4 08/07/2023    ALKPHOS 66 08/07/2023    ALT 13 08/07/2023    AST 19 08/07/2023    GLUCOSE 146 (H) 01/31/2024    ALBUMIN 4.4 08/07/2023     Lab Results   Component Value Date    WBC 8.60 01/31/2024    HGB 12.4 (L) 01/31/2024    HCT 37.8 01/31/2024     01/31/2024     Lab Results   Component Value Date    INR 0.98 09/29/2017    PTT 27.0 09/29/2017     Lab Results   Component Value Date    TROPONINT <0.010 10/26/2022    TROPONINT <0.010 06/17/2022     Lab Results   Component Value Date    PROBNP 814.0 10/26/2022       Lab Results   Component Value Date    CHLPL 118 08/07/2023    TRIG 122 08/07/2023    HDL 44 08/07/2023 "    LDL 52 08/07/2023     Lab Results   Component Value Date    TSH 5.060 (H) 07/10/2024    FREET4 1.24 07/10/2024       CV Diagnostics:  Procedures    CXR: Results for orders placed in visit on 09/29/17    XR Chest 2 View    Narrative  EXAMINATION: XR CHEST 2 VW-09/29/2017:    INDICATION: Preop.    COMPARISON: NONE.    FINDINGS: Cardiac size borderline enlarged without focal airspace  opacity or overt edema demonstrating minimal bibasilar atelectasis  greatest on the left. No pneumothorax or pleural effusion. No acute  osseous abnormality.    Impression  No acute cardiopulmonary findings.    D:  09/29/2017  E:  09/29/2017    This report was finalized on 9/29/2017 3:27 PM by Dr. Adriano Albert.     ECHO/MUGA: Results for orders placed during the hospital encounter of 01/26/24    Adult Transthoracic Echo Complete W/ Cont if Necessary Per Protocol    Interpretation Summary    Left ventricular systolic function is normal. Left ventricular ejection fraction appears to be 66 - 70%.    Left atrial volume is mildly increased.    Moderate mitral valve regurgitation is present.    Estimated right ventricular systolic pressure from tricuspid regurgitation is mildly elevated (35-45 mmHg).    Mild aortic valve regurgitation.     STRESS TESTS: No results found for this or any previous visit.     CARDIAC CATH: Results for orders placed during the hospital encounter of 01/08/21    Cardiac Catheterization/Vascular Study    Narrative  Cardiac Catheterization    Referring Provider: Perez Pompa MD    Indication(s) for this Procedure: Unstable angina pectoris abnormal stress test showing ischemia    Procedure(s) Performed: Left heart catheterization, coronary angiography, left ventriculography    Description of the Procedure:  Informed consent was obtained.  A sheath was placed a left radial artery and selective angiography of the right left coronary arteries as well as left heart catheterization left ventriculography was performed.   Procedure is terminated and the sheath was removed with the TR band and there were no early complications.      Angiographic Findings:  Coronary dominance, right  · LM:   Normal  · LAD: Luminal irregularities  · LCX: Luminal irregularities  · RCA: Normal    LV: LVEF 60%      Hemodynamic Findings:  Ao pressure: 90/60 mmHg  LVEDP: 6 mmHg      EBL: None  Specimen(s): None obtained    Complications: There were no early complications.    Final Impression(s):    Near normal coronary arteries  Normal left ventricular systolic function  Noncardiac symptoms  Falsely abnormal stress test    Recommendations:    Evaluation of noncardiac cause of symptoms     DEVICES: No valid procedures specified.   HOLTER: No results found for this or any previous visit.     CT/MRI:  Results for orders placed during the hospital encounter of 01/26/24    CT Angiogram Chest    Narrative  CT ANGIOGRAM CHEST    Date of Exam: 1/26/2024 4:18 PM EST    Indication: PVA (PFA).    TECHNIQUE: CT angiogram chest with and without intravenous contrast. 2D reconstructions performed.    The radiation dose reduction device was turned on for each scan per the ALARA (As Low as Reasonably Achievable) protocol.    COMPARISON: None.    FINDINGS: Four-chamber cardiac enlargement without pericardial effusion. Patent, nonaneurysmal thoracic aorta.  Central pulmonary arteries are grossly patent. No anomalous pulmonary venous return. Left atrial appendage well-opacified without thrombus  burden. Central airways are patent. Esophagus courses posterior to the left atrium, with moderate size hiatal hernia present. Extended lung windows are grossly clear.    Impression  1. Cardiac enlargement without pericardial effusion.    2. No anomalous pulmonary venous return.    3. Left atrial appendage well-opacified without significant thrombus burden.    4. Esophagus traverses the chest posterior to the left atrium just left of midline, with moderate size hiatal hernia  present.      Electronically Signed: Terence Cordoba MD  1/29/2024 5:50 PM EST  Workstation ID: [id number]    VASCULAR: No valid procedures specified.     Assessment and Plan  Diagnoses and all orders for this visit:    1. Atrial fibrillation, persistent (Primary)  Assessment & Plan:  Seen prior to ablation.  No clinical recurrence.  Some irregular heartbeats today on exam.  Blood pressure and heart rate to goal.Plan:  Continue metoprolol 25 mg p.o. twice daily  Loop recorder check today for rate control      2. Essential hypertension  Assessment & Plan:  Hypertension is stable and controlled  Continue current treatment regimen.  Weight loss.  Regular aerobic exercise.  Blood pressure will be reassessed in 6 months.      3. Mixed hyperlipidemia  Assessment & Plan:   Lipid abnormalities are improving with treatment     Plan:  Continue same medication/s without change.  Atorvastatin 20 mg p.o. daily     Discussed medication dosage, use, side effects, and goals of treatment in detail.    Counseled patient on lifestyle modifications to help control hyperlipidemia.      Lab Results   Component Value Date    CHLPL 118 08/07/2023    CHLPL 130 02/06/2023    TRIG 122 08/07/2023    TRIG 153 (H) 02/06/2023    HDL 44 08/07/2023    HDL 45 02/06/2023    LDL 52 08/07/2023    LDL 59 02/06/2023     Goal of therapy: LDL <70 mg/dL       Followup at the next regular appointment      4. Status post placement of implantable loop recorder  Assessment & Plan:  Confide.  Loop recorder check today to patient's home monitor for rate control in the setting of atrial fibrillation.      5. Chronic heart failure with preserved ejection fraction (HFpEF)  Assessment & Plan:  Normal ejection fraction with moderate MR, mild AI, mild pulm hypertension.  NYHA IIa, class C.  No volume overload on exam.  On Entresto 24 to 26 mg twice daily, asymptomatic.  Last proBNP normal range. Plan:  Add on proBNP to next labs, scheduled in December with  PCP  Consider optimizing CHF therapy if abnormal proBNP    Orders:  -     proBNP; Future  -     Magnesium; Future    Other orders  -     rivaroxaban (XARELTO) 20 MG tablet; Take 1 tablet by mouth Daily.  Dispense: 90 tablet; Refill: 3         Return in about 6 months (around 2/5/2025) for Follow-up with Dr Almaguer.    There are no Patient Instructions on file for this visit.    Juan Almaguer MD

## 2024-08-05 NOTE — ASSESSMENT & PLAN NOTE
Lipid abnormalities are improving with treatment     Plan:  Continue same medication/s without change.  Atorvastatin 20 mg p.o. daily     Discussed medication dosage, use, side effects, and goals of treatment in detail.    Counseled patient on lifestyle modifications to help control hyperlipidemia.      Lab Results   Component Value Date    CHLPL 118 08/07/2023    CHLPL 130 02/06/2023    TRIG 122 08/07/2023    TRIG 153 (H) 02/06/2023    HDL 44 08/07/2023    HDL 45 02/06/2023    LDL 52 08/07/2023    LDL 59 02/06/2023     Goal of therapy: LDL <70 mg/dL       Followup at the next regular appointment

## 2024-08-05 NOTE — ASSESSMENT & PLAN NOTE
Normal ejection fraction with moderate MR, mild AI, mild pulm hypertension.  NYHA IIa, class C.  No volume overload on exam.  On Entresto 24 to 26 mg twice daily, asymptomatic.  Last proBNP normal range. Plan:  Add on proBNP to next labs, scheduled in December with PCP  Consider optimizing CHF therapy if abnormal proBNP

## 2024-08-05 NOTE — ASSESSMENT & PLAN NOTE
Seen prior to ablation.  No clinical recurrence.  Some irregular heartbeats today on exam.  Blood pressure and heart rate to goal.Plan:  Continue metoprolol 25 mg p.o. twice daily  Loop recorder check today for rate control

## 2024-08-06 ENCOUNTER — OFFICE VISIT (OUTPATIENT)
Dept: ORTHOPEDIC SURGERY | Facility: CLINIC | Age: 79
End: 2024-08-06
Payer: MEDICARE

## 2024-08-06 VITALS
BODY MASS INDEX: 35.58 KG/M2 | SYSTOLIC BLOOD PRESSURE: 142 MMHG | WEIGHT: 221.4 LBS | DIASTOLIC BLOOD PRESSURE: 90 MMHG | HEIGHT: 66 IN

## 2024-08-06 DIAGNOSIS — M17.12 PRIMARY OSTEOARTHRITIS OF LEFT KNEE: Primary | ICD-10-CM

## 2024-08-06 PROBLEM — M17.10 ARTHRITIS OF KNEE: Status: ACTIVE | Noted: 2024-08-06

## 2024-08-06 PROCEDURE — 1159F MED LIST DOCD IN RCRD: CPT | Performed by: ORTHOPAEDIC SURGERY

## 2024-08-06 PROCEDURE — 1160F RVW MEDS BY RX/DR IN RCRD: CPT | Performed by: ORTHOPAEDIC SURGERY

## 2024-08-06 PROCEDURE — 3080F DIAST BP >= 90 MM HG: CPT | Performed by: ORTHOPAEDIC SURGERY

## 2024-08-06 PROCEDURE — 3077F SYST BP >= 140 MM HG: CPT | Performed by: ORTHOPAEDIC SURGERY

## 2024-08-06 PROCEDURE — 99214 OFFICE O/P EST MOD 30 MIN: CPT | Performed by: ORTHOPAEDIC SURGERY

## 2024-08-06 RX ORDER — MELOXICAM 7.5 MG/1
15 TABLET ORAL ONCE
OUTPATIENT
Start: 2024-08-06 | End: 2024-08-06

## 2024-08-06 RX ORDER — PREGABALIN 75 MG/1
75 CAPSULE ORAL ONCE
OUTPATIENT
Start: 2024-08-06 | End: 2024-08-06

## 2024-08-06 RX ORDER — CHLORHEXIDINE GLUCONATE 40 MG/ML
SOLUTION TOPICAL
Qty: 236 ML | Refills: 0 | Status: SHIPPED | OUTPATIENT
Start: 2024-08-06

## 2024-08-06 RX ORDER — ACETAMINOPHEN 325 MG/1
1000 TABLET ORAL ONCE
OUTPATIENT
Start: 2024-08-06 | End: 2024-08-06

## 2024-08-06 NOTE — PROGRESS NOTES
Orthopaedic Clinic Note: Knee Established Patient    Chief Complaint   Patient presents with    Left Knee - Pain        HPI    Ángel Parikh is a 78 y.o. male who presents with new problem of: left knee pain.  Onset: atraumatic and gradual in nature. The issue has been ongoing for 2 year(s). Pain is a 5/10 on the pain scale. Pain is described as dull and aching. Associated symptoms include pain and popping. The pain is worse with walking and climbing stairs; pain medication and/or NSAID improve the pain. Previous treatments have included: NSAIDS and steroid injection (last injection 5/8/24).  The injection provided about 3 to 4 weeks of relief.  His pain has returned.  He is here to discuss surgical intervention for his left knee as it is causing severe limitations in daily activities.    I have reviewed the following portions of the patient's history:History of Present Illness and review of systems.        Past Medical History:   Diagnosis Date    Anemia 11/22/2005    Arthritis     multi joint jose left knee     Cancer     prostate per elevated PSA and biopsies     CHF (congestive heart failure)     Colonic polyp     Elevated PSA     Encounter for screening colonoscopy     Essential hypertension     Gastroesophageal reflux disease without esophagitis     H/O valvular heart disease     3 leaky valves- monitored by FMD and Dr Scott (sees every 6 months)    Hearing loss     hearing aids in both ears    Heart murmur     Hemorrhoid     bleeds on occasion     Hiatal hernia     High risk medication use     History of cellulitis     History of tobacco use     QUIT AT AGE 51    Hypercholesterolemia     Hyperglycemia     Hyperlipidemia     Knee swelling     Morbid obesity     Overactive bladder     Psoriasis     spots on legs     Restless leg syndrome     Sleep apnea with use of continuous positive airway pressure (CPAP)     10 auto setting    Visual impairment     Vitamin B12 deficiency     Vitamin D deficiency     Wears  glasses     Wears partial dentures       Past Surgical History:   Procedure Laterality Date    CARDIAC CATHETERIZATION N/A 2021    Procedure: Left Heart Cath 83427;  Surgeon: Reggie Scott MD;  Location:  [facility];  Service: Cardiovascular;  Laterality: N/A;    CARDIAC ELECTROPHYSIOLOGY PROCEDURE N/A 2024    Procedure: Ablation atrial fibrillation (PFA). DNS Xarelto. Hold Flecainide 3 days prior.;  Surgeon: Luis Atwood DO;  Location:  [facility];  Service: Cardiovascular;  Laterality: N/A;    COLONOSCOPY  11/10/2015    MOD SIGMOID DIVERTICULOSIS, NO POLYPS. PLAN: FIBER, REPEAT IN 10 YEARS.    COLONOSCOPY  2005    DIVERTICULOSIS, INT HEMERRHOIDS. NO POLYPS. EGD- REFLUX, NO BLEEDING. FOLLOW UP CAPSULE ENDOSCOPY AT  -NORMAL    CYSTOSCOPY AND NEEDLE BIOPSY PROSTATE      twice; last time     PROSTATECTOMY N/A 10/03/2017    Procedure: PROSTATECTOMY LAPAROSCOPIC WITH DAVINCI ROBOT;  Surgeon: Jameel Ferguson Jr., MD;  Location:  [facility];  Service:       Family History   Problem Relation Age of Onset    Stroke Mother     Hypertension Mother     Heart attack Father     Hypertension Father     Cirrhosis Brother      Social History     Socioeconomic History    Marital status:    Tobacco Use    Smoking status: Former     Current packs/day: 0.00     Average packs/day: 1 pack/day for 15.0 years (15.0 ttl pk-yrs)     Types: Cigarettes, Pipe, Cigars     Start date: 1990     Quit date: 2005     Years since quittin.6     Passive exposure: Past    Smokeless tobacco: Never    Tobacco comments:     smoked 3 cigars a day and pipe off and on    Vaping Use    Vaping status: Never Used    Passive vaping exposure: Yes   Substance and Sexual Activity    Alcohol use: Not Currently    Drug use: Never    Sexual activity: Not Currently     Partners: Female      Current Outpatient Medications on File Prior to Visit   Medication Sig Dispense Refill  "   aspirin 81 MG EC tablet Take 1 tablet by mouth Daily. Stopped for surgery      atorvastatin (LIPITOR) 20 MG tablet Take 1 tablet by mouth Every Night. 90 tablet 3    doxazosin (CARDURA) 4 MG tablet Take 1 tablet by mouth Every Night. 90 tablet 2    Entresto 24-26 MG tablet Take 1 tablet by mouth 2 (Two) Times a Day. 180 tablet 2    fluticasone (FLONASE) 50 MCG/ACT nasal spray 2 sprays into the nostril(s) as directed by provider Daily.      metoprolol tartrate (LOPRESSOR) 25 MG tablet Take 1 tablet by mouth 2 (Two) Times a Day. 180 tablet 2    multivitamin with minerals (MULTIVITAMIN ADULT PO) Take 1 tablet by mouth Daily.      pantoprazole (PROTONIX) 40 MG EC tablet Take 1 tablet by mouth Daily. 90 tablet 2    rivaroxaban (XARELTO) 20 MG tablet Take 1 tablet by mouth Daily. 90 tablet 3    Vibegron (Gemtesa) 75 MG tablet Take 1 tablet by mouth Daily.      vitamin B-12 (CYANOCOBALAMIN) 1000 MCG tablet Take 1 tablet by mouth Daily.       No current facility-administered medications on file prior to visit.      Allergies   Allergen Reactions    Levofloxacin Itching and Rash    Meloxicam Swelling    Percocet [Oxycodone-Acetaminophen] Rash     Chest and back red        Review of Systems   Constitutional: Negative.    HENT: Negative.     Eyes: Negative.    Respiratory: Negative.     Cardiovascular: Negative.    Gastrointestinal: Negative.    Endocrine: Negative.    Genitourinary: Negative.    Musculoskeletal:  Positive for arthralgias.   Skin: Negative.    Allergic/Immunologic: Negative.    Neurological: Negative.    Hematological: Negative.    Psychiatric/Behavioral: Negative.          The patient's Review of Systems was personally reviewed and confirmed as accurate.    Physical Exam  Blood pressure 142/90, height 168.7 cm (66.42\"), weight 100 kg (221 lb 6.4 oz).    Body mass index is 35.29 kg/m².    GENERAL APPEARANCE: awake, alert, oriented, in no acute distress and well developed, well nourished  LUNGS:  breathing " nonlabored  EXTREMITIES: no clubbing, cyanosis  PERIPHERAL PULSES: palpable dorsalis pedis and posterior tibial pulses bilaterally.    GAIT:  Antalgic        ----------  Left Knee Exam:  ----------  ALIGNMENT: severe varus, correctable to neutral  ----------  RANGE OF MOTION:  Decreased (5 - 115 degrees) with no extensor lag  LIGAMENTOUS STABILITY:   stable to varus and valgus stress at terminal extension and 30 degrees; retensioning of the MCL is appreciated with valgus stress at 30 degrees consistent with medial compartment degeneration  ----------  STRENGTH:  KNEE FLEXION 4/5  KNEE EXTENSION  4/5  ANKLE DORSIFLEXION  5/5  ANKLE PLANTARFLEXION  5/5  ----------  PAIN WITH PALPATION:global  KNEE EFFUSION: yes, mild effusion  PAIN WITH KNEE ROM: yes  PATELLAR CREPITUS:  yes, painful and symptomatic  ----------  SENSATION TO LIGHT TOUCH:  DEEP PERONEAL/SUPERFICIAL PERONEAL/SURAL/SAPHENOUS/TIBIAL:    intact  ----------  EDEMA:  no  ERYTHEMA:    no  WOUNDS/INCISIONS:   no  _____________________________________________________________________  _____________________________________________________________________    RADIOGRAPHIC FINDINGS:   No new imaging today.  Prior imaging from 5/8/2024 reveals moderate to severe tricompartmental osteoarthritis with genu varum alignment and bone-on-bone articulation (Kellgren-Piter grade 4) medial compartment.    Assessment/Plan:   Diagnosis Plan   1. Primary osteoarthritis of left knee  Case Request    CBC and Differential    Comprehensive metabolic panel    Protime-INR    APTT    Hemoglobin A1c    ECG 12 Lead    Nicotine & Metabolite, Quant    Tranexamic Acid 1,000 mg in sodium chloride 0.9 % 100 mL    Tranexamic Acid 1,000 mg in sodium chloride 0.9 % 100 mL    ethyl alcohol 62 % 2 each    ceFAZolin (ANCEF) 2 g in sodium chloride 0.9 % 100 mL IVPB    acetaminophen (TYLENOL) tablet 975 mg    meloxicam (MOBIC) tablet 15 mg    pregabalin (LYRICA) capsule 75 mg    Case Request    CT  Lower Extremity Left Without Contrast        The patient has clinical and radiographic evidence of end-stage left knee joint degeneration. Conservative measures have been tried for 3 months or longer, but have failed to adequately treat or improve the patient's symptoms. Pain is restricting the patient's daily activities as well as quality of life. The recommendation at this time is to proceed with a left total knee arthroplasty with the goal to improve patient function and pain. The risks, benefits, potential complications, and alternatives were discussed with the patient in detail. Risks included but were not limited to bleeding, infection, anesthesia risks, damage to neurovascular structures, osteolysis, aseptic loosening, instability, dislocation, pain, continued pain, iatrogenic fracture, possible need for future surgery including the potential for amputation, blood clots, myocardial infarction, stroke, and death. Trudy-operative blood management and the potential for blood transfusion were discussed with risks and options clearly outlined. Specific details of the surgical procedure, hospitalization, recovery, rehabilitation, and long-term precautions were also presented. Pre-operative teaching was provided. Implant/prosthesis selection was outlined, and the many options available were explained; the final choice will be made at the time of the procedure to match the anatomy and condition of the bone, ligaments, tendons, and muscles. Given this instruction, the patient elected to proceed with the left total knee arthroplasty. The patient will be seen by pre-admission testing for pre-operative optimization and risk assessment and will be scheduled for surgery once this is completed.    The patient is considered high risk for DVT based on patient risk factors and will be placed on Lovenox bridge to home Xarelto postoperatively for DVT prophylaxis.        Perez Hull MD  08/06/24  11:56 EDT

## 2024-08-07 ENCOUNTER — TELEPHONE (OUTPATIENT)
Dept: CARDIOLOGY | Facility: CLINIC | Age: 79
End: 2024-08-07
Payer: MEDICARE

## 2024-08-07 NOTE — TELEPHONE ENCOUNTER
REQUEST FOR CARDIAC CLEARANCE    Caller name: Ángel Parikh     Phone Number: [phone]    Surgeon's name: JARET NGUYEN    Type of planned surgery:LEFT KNEE REPLACEMENT    Date of planned surgery: 9-9-24    Type of anesthesia: NOT SURE    Have you been experiencing chest pain or shortness of breath? NO    Is your doctor requesting for you to stop any of your medications prior to your surgery? BLOOD THINNERS 3 DAYS PRIOR    Where should we fax the clearance to?   PHONE # [phone]

## 2024-08-08 ENCOUNTER — TELEPHONE (OUTPATIENT)
Dept: CARDIOLOGY | Facility: CLINIC | Age: 79
End: 2024-08-08

## 2024-08-08 NOTE — TELEPHONE ENCOUNTER
Called patient to request patient to come in for EKG prior to surgery clearance. No answer, left voicemail requesting call back

## 2024-08-08 NOTE — TELEPHONE ENCOUNTER
Name: Ángel Parikh      Relationship: Self      Best Callback Number: [phone]       HUB PROVIDED THE RELAY MESSAGE FROM THE OFFICE      PATIENT: VOICED UNDERSTANDING AND HAS NO FURTHER QUESTIONS AT THIS TIME    ADDITIONAL INFORMATION: OFFICE SAID IT WAS OKAY TO RELAY MESSAGE ABOUT EKG.

## 2024-08-09 ENCOUNTER — DOCUMENTATION (OUTPATIENT)
Dept: CARDIOLOGY | Facility: CLINIC | Age: 79
End: 2024-08-09
Payer: MEDICARE

## 2024-08-09 NOTE — PROGRESS NOTES
Pt is enrolled in the [employer] study & followed by Dr. Atwood. Pt saw Dr. Juan Almaguer in [place], [place]. Pt was enrolled in their [employer] web site for remote monitoring. I re-enrolled the patient in [facility] cardiac device clinic.

## 2024-08-13 DIAGNOSIS — M17.12 PRIMARY OSTEOARTHRITIS OF LEFT KNEE: Primary | ICD-10-CM

## 2024-08-15 ENCOUNTER — CLINICAL SUPPORT (OUTPATIENT)
Dept: CARDIOLOGY | Facility: CLINIC | Age: 79
End: 2024-08-15
Payer: MEDICARE

## 2024-08-15 DIAGNOSIS — Z95.818 STATUS POST PLACEMENT OF IMPLANTABLE LOOP RECORDER: ICD-10-CM

## 2024-08-15 DIAGNOSIS — I48.19 ATRIAL FIBRILLATION, PERSISTENT: Primary | ICD-10-CM

## 2024-08-15 PROCEDURE — 93000 ELECTROCARDIOGRAM COMPLETE: CPT | Performed by: INTERNAL MEDICINE

## 2024-08-15 NOTE — PROGRESS NOTES
[facility] CARDIOLOGY  [address] [place] [address]  Dept: [phone]  Dept Fax: [phone]    Date: 08/15/2024  Patient: Ángel Parikh  YOB: 1945    Procedure Note    ECG 12 Lead    Date/Time: 8/15/2024 5:22 PM  Performed by: Juan Almaguer MD    Authorized by: Juan Almaguer MD  Comparison: compared with previous ECG from 1/31/2024  Comparison to previous ECG: New onset sinus bradycardia and left axis deviation  Rhythm: sinus bradycardia  Conduction: 1st degree AV block  QRS axis: left  Comments: Sinus bradycardia with 2 degree block, left axis deviation, left anterior fascicular block           Assessment and Plan  Diagnoses and all orders for this visit:    1. Atrial fibrillation, persistent (Primary)  -     ECG 12 Lead    2. Status post placement of implantable loop recorder  -     ECG 12 Lead         Juan Almaguer MD

## 2024-08-15 NOTE — PROGRESS NOTES
Patient presented for EKG for surgical clearance. EKG performed, Dr Almaguer reviewed. Cleared for ortho surgery

## 2024-08-21 ENCOUNTER — OFFICE VISIT (OUTPATIENT)
Dept: ENDOCRINOLOGY | Facility: CLINIC | Age: 79
End: 2024-08-21
Payer: MEDICARE

## 2024-08-21 VITALS
HEART RATE: 55 BPM | OXYGEN SATURATION: 97 % | BODY MASS INDEX: 36.4 KG/M2 | WEIGHT: 226.5 LBS | HEIGHT: 66 IN | DIASTOLIC BLOOD PRESSURE: 86 MMHG | SYSTOLIC BLOOD PRESSURE: 144 MMHG

## 2024-08-21 DIAGNOSIS — R94.6 ABNORMAL THYROID FUNCTION TEST: Primary | ICD-10-CM

## 2024-08-21 PROCEDURE — 1160F RVW MEDS BY RX/DR IN RCRD: CPT | Performed by: PHYSICIAN ASSISTANT

## 2024-08-21 PROCEDURE — 99213 OFFICE O/P EST LOW 20 MIN: CPT | Performed by: PHYSICIAN ASSISTANT

## 2024-08-21 PROCEDURE — 3079F DIAST BP 80-89 MM HG: CPT | Performed by: PHYSICIAN ASSISTANT

## 2024-08-21 PROCEDURE — 36415 COLL VENOUS BLD VENIPUNCTURE: CPT | Performed by: PHYSICIAN ASSISTANT

## 2024-08-21 PROCEDURE — 1159F MED LIST DOCD IN RCRD: CPT | Performed by: PHYSICIAN ASSISTANT

## 2024-08-21 PROCEDURE — 3077F SYST BP >= 140 MM HG: CPT | Performed by: PHYSICIAN ASSISTANT

## 2024-08-21 NOTE — ASSESSMENT & PLAN NOTE
Clinically euthyroid.  Likely subacute thyroiditis given improvement in TSH over time.  Discussed potential for progression of hypothyroidism given elevation in TSH on last lab.  Repeat TFT.   If TSH continues improved advise continue recheck in 3 to 6 months for monitoring.  Advised follow-up in 6 months or continue monitoring with PCP as needed.

## 2024-08-21 NOTE — PROGRESS NOTES
Chief Complaint   Patient presents with    Hyperthyroidism      HPI  Ángel Parikh is a 78 y.o. male presents today for evaluation of hyperthyroidism.  He was last seen for this 7/10/2024.  TSH: 5.06, FT4: 1.24, total T3: 153, TSI:<0.10.      Without concerns today. Admits chronic fatigue that is unchanged. Difficulty swallowing improved.     Denies hoarseness. Denies changes in weight, weakness, heat/cold intolerance, chest pain, palpitations, tremor, abdominal pain, diarrhea, constipation, insomnia, anxiety/depression, vision changes, changes in hair/skin/nails.      Past medical history: Hypertension, hyperlipidemia, A-fib, MAE, obesity, GERD, hx prostate cancer s/p prostatectomy      Low TSH:  Initially told abnormal labs earlier 2024     Labs from  7/10/2024 TSH: 5.06, FT4: 1.24, total T3: 153, TSI:<0.10.   1/10/2024 TSH: 0.006, T4: 10.6  6/1/2022 TSH: 4.680     - Denies hx amiodarone/digoxin use.   - Denies history of thyroid ultrasound, uptake scan, thyroidectomy.  - Unknown family history of thyroid disease or thyroid cancer.   - Denies history of radiation to head/neck  - Denies taking biotin supplement. Taking multivitamin.   - Denies high iodine diet.        Past Medical History:   Diagnosis Date    Anemia 11/22/2005    Arthritis     multi joint jose left knee     Cancer     prostate per elevated PSA and biopsies     CHF (congestive heart failure)     Colonic polyp     Elevated PSA     Encounter for screening colonoscopy     Essential hypertension     Gastroesophageal reflux disease without esophagitis     H/O valvular heart disease     3 leaky valves- monitored by FMD and Dr Scott (sees every 6 months)    Hearing loss     hearing aids in both ears    Heart murmur     Hemorrhoid     bleeds on occasion     Hiatal hernia     High risk medication use     History of cellulitis     History of tobacco use     QUIT AT AGE 51    Hypercholesterolemia     Hyperglycemia     Hyperlipidemia     Knee swelling      Morbid obesity     Overactive bladder     Psoriasis     spots on legs     Restless leg syndrome     Sleep apnea with use of continuous positive airway pressure (CPAP)     10 auto setting    Visual impairment     Vitamin B12 deficiency     Vitamin D deficiency     Wears glasses     Wears partial dentures      Past Surgical History:   Procedure Laterality Date    CARDIAC CATHETERIZATION N/A 2021    Procedure: Left Heart Cath 09322;  Surgeon: Reggie Scott MD;  Location:  [facility];  Service: Cardiovascular;  Laterality: N/A;    CARDIAC ELECTROPHYSIOLOGY PROCEDURE N/A 2024    Procedure: Ablation atrial fibrillation (PFA). DNS Xarelto. Hold Flecainide 3 days prior.;  Surgeon: Luis Atwood DO;  Location:  [facility];  Service: Cardiovascular;  Laterality: N/A;    COLONOSCOPY  11/10/2015    MOD SIGMOID DIVERTICULOSIS, NO POLYPS. PLAN: FIBER, REPEAT IN 10 YEARS.    COLONOSCOPY  2005    DIVERTICULOSIS, INT HEMERRHOIDS. NO POLYPS. EGD- REFLUX, NO BLEEDING. FOLLOW UP CAPSULE ENDOSCOPY AT  -NORMAL    CYSTOSCOPY AND NEEDLE BIOPSY PROSTATE      twice; last time     PROSTATECTOMY N/A 10/03/2017    Procedure: PROSTATECTOMY LAPAROSCOPIC WITH DAVINCI ROBOT;  Surgeon: Jameel Ferguson Jr., MD;  Location:  [facility];  Service:       Family History   Problem Relation Age of Onset    Stroke Mother     Hypertension Mother     Heart attack Father     Hypertension Father     Cirrhosis Brother       Social History     Socioeconomic History    Marital status:    Tobacco Use    Smoking status: Former     Current packs/day: 0.00     Average packs/day: 1 pack/day for 15.0 years (15.0 ttl pk-yrs)     Types: Cigarettes, Pipe, Cigars     Start date: 1990     Quit date: 2005     Years since quittin.6     Passive exposure: Past    Smokeless tobacco: Never    Tobacco comments:     smoked 3 cigars a day and pipe off and on    Vaping Use    Vaping status:  Never Used    Passive vaping exposure: Yes   Substance and Sexual Activity    Alcohol use: Not Currently    Drug use: Never    Sexual activity: Not Currently     Partners: Female      Allergies   Allergen Reactions    Levofloxacin Itching and Rash    Meloxicam Swelling    Percocet [Oxycodone-Acetaminophen] Rash     Chest and back red      Current Outpatient Medications on File Prior to Visit   Medication Sig Dispense Refill    aspirin 81 MG EC tablet Take 1 tablet by mouth Daily. Stopped for surgery      atorvastatin (LIPITOR) 20 MG tablet Take 1 tablet by mouth Every Night. 90 tablet 3    Chlorhexidine Gluconate 4 % solution Shower daily with hibiclens solution as directed 5 days prior to surgery. 236 mL 0    doxazosin (CARDURA) 4 MG tablet Take 1 tablet by mouth Every Night. 90 tablet 2    Entresto 24-26 MG tablet Take 1 tablet by mouth 2 (Two) Times a Day. 180 tablet 2    fluticasone (FLONASE) 50 MCG/ACT nasal spray 2 sprays into the nostril(s) as directed by provider Daily.      metoprolol tartrate (LOPRESSOR) 25 MG tablet Take 1 tablet by mouth 2 (Two) Times a Day. 180 tablet 2    multivitamin with minerals (MULTIVITAMIN ADULT PO) Take 1 tablet by mouth Daily.      pantoprazole (PROTONIX) 40 MG EC tablet Take 1 tablet by mouth Daily. 90 tablet 2    rivaroxaban (XARELTO) 20 MG tablet Take 1 tablet by mouth Daily. 90 tablet 3    Vibegron (Gemtesa) 75 MG tablet Take 1 tablet by mouth Daily.      vitamin B-12 (CYANOCOBALAMIN) 1000 MCG tablet Take 1 tablet by mouth Daily.       No current facility-administered medications on file prior to visit.        Review of Systems   Constitutional:  Positive for fatigue. Negative for activity change, appetite change, unexpected weight gain and unexpected weight loss.   HENT:  Negative for trouble swallowing and voice change.    Respiratory:  Negative for shortness of breath.    Cardiovascular:  Negative for chest pain, palpitations and leg swelling.   Gastrointestinal:   "Negative for constipation and diarrhea.   Endocrine: Negative for cold intolerance and heat intolerance.   Musculoskeletal:  Negative for myalgias.   Skin:  Negative for dry skin.   Neurological:  Negative for tremors and numbness.   Psychiatric/Behavioral:  Negative for depressed mood and stress. The patient is not nervous/anxious.         /86 (BP Location: Right arm, Patient Position: Sitting, Cuff Size: Adult)   Pulse 55   Ht 168.7 cm (66.42\")   Wt 103 kg (226 lb 8 oz)   SpO2 97%   BMI 36.10 kg/m² Body mass index is 36.1 kg/m².    Physical Exam  Constitutional:       Appearance: Normal appearance.   Neck:      Thyroid: No thyroid mass, thyromegaly or thyroid tenderness.   Cardiovascular:      Rate and Rhythm: Normal rate.   Pulmonary:      Effort: Pulmonary effort is normal.   Musculoskeletal:         General: Normal range of motion.   Skin:     General: Skin is warm and dry.   Neurological:      General: No focal deficit present.      Mental Status: He is alert and oriented to person, place, and time.   Psychiatric:         Mood and Affect: Mood normal.         Behavior: Behavior normal.         LABS AND IMAGING      TSH  Lab Results   Component Value Date    TSH 5.060 (H) 07/10/2024    TSH 4.680 (H) 06/01/2022       T4  Lab Results   Component Value Date    FREET4 1.24 07/10/2024     No results found for: \"T6TORRT\"    T3  No results found for: \"T3FREE\"  Lab Results   Component Value Date    X2QBZNY 153 07/10/2024       No valid procedures specified.    Assessment and Plan    Diagnoses and all orders for this visit:    1. Abnormal thyroid function test (Primary)  Assessment & Plan:  Clinically euthyroid.  Likely subacute thyroiditis given improvement in TSH over time.  Discussed potential for progression of hypothyroidism given elevation in TSH on last lab.  Repeat TFT.   If TSH continues improved advise continue recheck in 3 to 6 months for monitoring.  Advised follow-up in 6 months or continue " monitoring with PCP as needed.      Orders:  -     Cancel: TSH  -     Cancel: T4, Free         Return in about 6 months (around 2/21/2025) for follow-up thyroid disease. The patient was instructed to contact the clinic with any interval questions or concerns.    Electronically signed by: Jennifer Hernandez PA-C   Endocrinology     Please note that portions of this note were completed with a voice recognition program.

## 2024-08-22 ENCOUNTER — TELEPHONE (OUTPATIENT)
Dept: CARDIOLOGY | Facility: CLINIC | Age: 79
End: 2024-08-22
Payer: MEDICARE

## 2024-08-22 NOTE — TELEPHONE ENCOUNTER
Remote monitor reading alerted for 4 sec  pause on 8/21/24 11:10 am.  Called Mr Parikh to check to see if he had any symptoms.  He has had no symptoms of dizziness and unaware of episode.

## 2024-08-23 LAB
CONV .: NORMAL
Lab: NORMAL
NTI SERUM GEL TUBE: NORMAL

## 2024-08-26 ENCOUNTER — PRE-ADMISSION TESTING (OUTPATIENT)
Dept: PREADMISSION TESTING | Facility: HOSPITAL | Age: 79
End: 2024-08-26
Payer: MEDICARE

## 2024-08-26 ENCOUNTER — HOSPITAL ENCOUNTER (OUTPATIENT)
Dept: CT IMAGING | Facility: HOSPITAL | Age: 79
Discharge: HOME | End: 2024-08-26
Payer: MEDICARE

## 2024-08-26 VITALS — BODY MASS INDEX: 36.25 KG/M2 | WEIGHT: 225.53 LBS | HEIGHT: 66 IN

## 2024-08-26 DIAGNOSIS — M17.12 PRIMARY OSTEOARTHRITIS OF LEFT KNEE: ICD-10-CM

## 2024-08-26 LAB
ALBUMIN SERPL-MCNC: 4 G/DL (ref 3.5–5.2)
ALBUMIN/GLOB SERPL: 1.5 G/DL
ALP SERPL-CCNC: 96 U/L (ref 39–117)
ALT SERPL W P-5'-P-CCNC: 13 U/L (ref 1–41)
ANION GAP SERPL CALCULATED.3IONS-SCNC: 9 MMOL/L (ref 5–15)
APTT PPP: 37.5 SECONDS (ref 22–39)
AST SERPL-CCNC: 19 U/L (ref 1–40)
BASOPHILS # BLD AUTO: 0.03 10*3/MM3 (ref 0–0.2)
BASOPHILS NFR BLD AUTO: 0.5 % (ref 0–1.5)
BILIRUB SERPL-MCNC: 0.3 MG/DL (ref 0–1.2)
BUN SERPL-MCNC: 16 MG/DL (ref 8–23)
BUN/CREAT SERPL: 17.4 (ref 7–25)
CALCIUM SPEC-SCNC: 8.7 MG/DL (ref 8.6–10.5)
CHLORIDE SERPL-SCNC: 105 MMOL/L (ref 98–107)
CO2 SERPL-SCNC: 27 MMOL/L (ref 22–29)
CREAT SERPL-MCNC: 0.92 MG/DL (ref 0.76–1.27)
DEPRECATED RDW RBC AUTO: 46.4 FL (ref 37–54)
EGFRCR SERPLBLD CKD-EPI 2021: 85.7 ML/MIN/1.73
EOSINOPHIL # BLD AUTO: 0.51 10*3/MM3 (ref 0–0.4)
EOSINOPHIL NFR BLD AUTO: 8.9 % (ref 0.3–6.2)
ERYTHROCYTE [DISTWIDTH] IN BLOOD BY AUTOMATED COUNT: 13.2 % (ref 12.3–15.4)
GLOBULIN UR ELPH-MCNC: 2.7 GM/DL
GLUCOSE SERPL-MCNC: 104 MG/DL (ref 65–99)
HBA1C MFR BLD: 5.2 % (ref 4.8–5.6)
HCT VFR BLD AUTO: 40.6 % (ref 37.5–51)
HGB BLD-MCNC: 13.5 G/DL (ref 13–17.7)
IMM GRANULOCYTES # BLD AUTO: 0.01 10*3/MM3 (ref 0–0.05)
IMM GRANULOCYTES NFR BLD AUTO: 0.2 % (ref 0–0.5)
INR PPP: 1.84 (ref 0.89–1.12)
LYMPHOCYTES # BLD AUTO: 1.38 10*3/MM3 (ref 0.7–3.1)
LYMPHOCYTES NFR BLD AUTO: 24.1 % (ref 19.6–45.3)
MCH RBC QN AUTO: 31.8 PG (ref 26.6–33)
MCHC RBC AUTO-ENTMCNC: 33.3 G/DL (ref 31.5–35.7)
MCV RBC AUTO: 95.8 FL (ref 79–97)
MONOCYTES # BLD AUTO: 0.39 10*3/MM3 (ref 0.1–0.9)
MONOCYTES NFR BLD AUTO: 6.8 % (ref 5–12)
NEUTROPHILS NFR BLD AUTO: 3.41 10*3/MM3 (ref 1.7–7)
NEUTROPHILS NFR BLD AUTO: 59.5 % (ref 42.7–76)
NRBC BLD AUTO-RTO: 0 /100 WBC (ref 0–0.2)
PLATELET # BLD AUTO: 173 10*3/MM3 (ref 140–450)
PMV BLD AUTO: 10 FL (ref 6–12)
POTASSIUM SERPL-SCNC: 4.3 MMOL/L (ref 3.5–5.2)
PROT SERPL-MCNC: 6.7 G/DL (ref 6–8.5)
PROTHROMBIN TIME: 21.3 SECONDS (ref 12.2–14.5)
RBC # BLD AUTO: 4.24 10*6/MM3 (ref 4.14–5.8)
SODIUM SERPL-SCNC: 141 MMOL/L (ref 136–145)
WBC NRBC COR # BLD AUTO: 5.73 10*3/MM3 (ref 3.4–10.8)

## 2024-08-26 PROCEDURE — 85730 THROMBOPLASTIN TIME PARTIAL: CPT

## 2024-08-26 PROCEDURE — 85610 PROTHROMBIN TIME: CPT

## 2024-08-26 PROCEDURE — 73700 CT LOWER EXTREMITY W/O DYE: CPT

## 2024-08-26 PROCEDURE — 83036 HEMOGLOBIN GLYCOSYLATED A1C: CPT

## 2024-08-26 PROCEDURE — 80053 COMPREHEN METABOLIC PANEL: CPT

## 2024-08-26 PROCEDURE — 85025 COMPLETE CBC W/AUTO DIFF WBC: CPT

## 2024-08-26 PROCEDURE — G0480 DRUG TEST DEF 1-7 CLASSES: HCPCS

## 2024-08-26 PROCEDURE — 36415 COLL VENOUS BLD VENIPUNCTURE: CPT

## 2024-08-26 NOTE — PAT
An arrival time for procedure was not provided during PAT visit. If patient had any questions or concerns about their arrival time, they were instructed to contact their surgeon/physician.  Additionally, if the patient referred to an arrival time that was acquired from their my chart account, patient was encouraged to verify that time with their surgeon/physician. Arrival times are NOT provided in Pre Admission Testing Department.    Patient viewed general PAT education video as instructed in their preoperative information received from their surgeon.  Patient stated the general PAT education video was viewed in its entirety and survey completed.  Copies of PAT general education handouts (Incentive Spirometry, Meds to Beds Program, Patient Belongings, Pre-op skin preparation instructions, Blood Glucose testing, Visitor policy, Surgery FAQ, Code H) distributed to patient if not printed. Education related to the PAT pass and skin preparation for surgery (if applicable) completed in PAT as a reinforcement to PAT education video. Patient instructed to return PAT pass provided today as well as completed skin preparation sheet (if applicable) on the day of procedure.     Additionally if patient had not viewed video yet but intended to view it at home or in our waiting area, then referred them to the handout with QR code/link provided during PAT visit.  Encouraged patient/family to read PAT general education handouts thoroughly and notify PAT staff with any questions or concerns. Patient verbalized understanding of all information and priority content.    Patient denies any current skin issues.     Per Anesthesia Request, patient instructed not to take their ACE/ARB medications on the AM of surgery.    Patient instructed to drink 20 ounces of Gatorade or Gatorlyte (if diabetic) and it needs to be completed 1 hour (for Main OR patients) or 2 hours (scheduled  section & [facility] patients) before given arrival time for  procedure (NO RED Gatorade and NO Gatorade Zero).    Patient verbalized understanding.    Patient to apply Chlorhexadine wipes  to surgical area (as instructed) the night before procedure and the AM of procedure. Wipes provided.    Discussed with patient options for receiving total joint replacement education and assessed patient's ability and preference. Joint Replacement Guide given to patient during PAT visit since not received a copy within the last year. Encouraged patient/family to read guide thoroughly and notify PAT staff with any questions or concerns. Handout provided directing patient to links to watch online videos related to joint replacement surgery on the [facility] website. The handout gives detailed instructions for joining an online joint replacement class through Microsoft Teams or phone conference offered on the 1st and 3rd Thursdays of the month. Patient agreed to participate by watching videos online. Patient verbalized understanding of instructions. Encouraged to share information with family and/or . An overview of the joint replacement education was provided during the visit including general perioperative instructions that are routine for all surgical patients (PAT PASS, wipes, directions to pre-op, etc.).    Prescription for Chlorhexidine shower called into patient's pharmacy or [facility] pharmacy by patient's surgeon.  Reinforced with patient to  the prescription from applicable pharmacy if they haven't already.  Verbal and written instructions given regarding proper use of Chlorhexidine body wash to patient and/or famlily during PAT visit. Patient/family also instructed to complete checklist and return it to Pre-op on the day of surgery.  Patient and/or family verbalized understanding.    Post-Surgery Information Instruction Sheet given to patient during Pre-Admission Testing Visit with verbal instructions to patient to return with PAT PASS on the day of surgery. Additionally,  encouraged patient to review the information provided.    [employer] (by [employer]) pain pump patient informational handout given to patient.  Instructed patient to watch [employer] Patient Education Video regarding Peripheral Nerve Catheter that will be in place for upcoming surgery unless contraindicated. The video can be accessed using QR code noted on handout.  Patient agreed to watch video.  Stressed to patient to call [employer] Nursing Hotline 24/7 if patient has any questions or concerns after discharge.

## 2024-09-01 LAB
COTININE SERPL-MCNC: <1 NG/ML
NICOTINE SERPL-MCNC: <1 NG/ML

## 2024-09-04 ENCOUNTER — TRANSITIONAL CARE MANAGEMENT TELEPHONE ENCOUNTER (OUTPATIENT)
Dept: ORTHOPEDIC SURGERY | Facility: CLINIC | Age: 79
End: 2024-09-04
Payer: MEDICARE

## 2024-09-04 NOTE — OUTREACH NOTE
TOTAL JOINT REPLACEMENT CARE NAVIGATION INITIAL ASSESSMENT    PATIENT INFORMATION:     Patient: Ángel Parikh       YOB: 1945         Age/Gender: 78 y.o. male     Medical Record Number: [id number]     Surgery:  LT TKA                      Surgery Date: 09/09/24    Surgeon: DR. JARET NGUYEN     Physical Therapy Location: [facility]    PT Date & Time: 09/11 @ 2 PM    DVT PPX:       LIVING SITUATION:     [x]Private Residence      Who lives in the home?  WIFE                        []Nursing Home:                                     []Assisted Living  []Rehabilitation Facility:                          []Live Alone  []Homeless    HOUSING STYLE:     [x]Ranch Style   []Multi-level   []Split level   []Townhouse   []Apartment    Do you have steps to navigate in the home? NO  Do you have steps to navigate outside the home? 5 STEPS      ADL:     [x]Independent   []Independent with difficulty   []Partially Dependent   []Dependent    Do you require bathing/showering? NO  Do you require assistance with grooming (brushing hair, shaving, etc)? NO  Do you require assistance dressing? NO  Do you require assistance with walking? NO  Do you require assistive devices while walking (cane, walker, wheelchair)? NO  Do you require assistance with transfers (moving from bed to chair, wheelchair, etc)? NO  Do you require assistance preparing meals? NO  Do you require assistance when eating meals? NO  Do you require assistance to use the toilet? NO  Do you have any issues with bowel or bladder incontinence? NO  Do you require assistance with household chores (cleaning, laundry, etc)? NO  Have you had any recent falls? NO    CURRENT SERVICES:    []Home Health (PT, OT, Skilled Nursing)  Agency:    County:  []Palliative Care  []Meals on Wheels  []Daily Caregiver  [x]None    CURRENT DME:    [x]Walker   []Cane   []Wheelchair   []Crutches   []Bedside Commode   [x]Shower Chair  []Hospital Bed   []Nebulizer    []Oxygen  []Other:    MEDICATIONS:    Are you currently on any blood thinners? XARELTO  Are you currently on any anti-inflammatory medications? NO  Are you currently on any medications for rheumatoid arthritis? NO  Are you currently taking any herbal supplements? NO      Post-op Pharmacy Name:   [facility]                 Phone Number: [phone]    Does anyone assist you filling medication boxes or remind you that medication is due? NO  Are you currently on a mail order prescription plan? [employer]  What pharmacy do you use to fill your short-term prescriptions? [facility] ( )  Do you have prescription insurance coverage? YES  Can you afford your medications/co-pays? YES    CURRENT TRANSPORTATION:    Which services do you rely on? []Taxi   []Public Transportation   [x]Private Vehicle     []Ambulance   []Tack ([facility])    Do you have a way to get home when you are discharged? YES    POTENTIAL NEEDS: NONE    []Rehabilitation   []Home Health PT   []SNF  []Meals on Wheels   []Department of    []Palliative Care  []Nursing Home   []Hospice   []Durable Medical Equipment  []Caregiver Services   []Financial Services   []Pre-op In Home PT Evaluation    CLEARANCES NEEDED FOR SURGERY: NONE    [x]Dental   [x]Cardiology   []Pulmonology   []Medical (PCP)   []Rheumatology  []Endocrinology   []Hematology/Oncology   []Neurology   []Neurosurgery   []CT Vascular      INITIAL DISCHARGE PLAN:  OUTPATIENT, D/C HOME WITH WIFE'S ASSISTANCE. HAS RECOMMENDED DME. PHYSICAL THERAPY WILL BEGIN WITH JOHNATHAN MALDONADO ON 09/11. PATIENT WILL RESUME XARELTO AFTER SURGERY FOR HIS DVT PROPHYLAXIS PER DR. NGUYEN'S SURGERY SHEET.

## 2024-09-08 ENCOUNTER — ANESTHESIA EVENT (OUTPATIENT)
Dept: PERIOP | Facility: HOSPITAL | Age: 79
End: 2024-09-08
Payer: MEDICARE

## 2024-09-08 RX ORDER — SODIUM CHLORIDE 0.9 % (FLUSH) 0.9 %
10 SYRINGE (ML) INJECTION AS NEEDED
Status: CANCELLED | OUTPATIENT
Start: 2024-09-08

## 2024-09-08 RX ORDER — FAMOTIDINE 10 MG/ML
20 INJECTION, SOLUTION INTRAVENOUS ONCE
Status: CANCELLED | OUTPATIENT
Start: 2024-09-08 | End: 2024-09-08

## 2024-09-08 RX ORDER — SODIUM CHLORIDE 9 MG/ML
40 INJECTION, SOLUTION INTRAVENOUS AS NEEDED
Status: CANCELLED | OUTPATIENT
Start: 2024-09-08

## 2024-09-08 RX ORDER — SODIUM CHLORIDE 0.9 % (FLUSH) 0.9 %
10 SYRINGE (ML) INJECTION EVERY 12 HOURS SCHEDULED
Status: CANCELLED | OUTPATIENT
Start: 2024-09-08

## 2024-09-09 ENCOUNTER — ANESTHESIA (OUTPATIENT)
Dept: PERIOP | Facility: HOSPITAL | Age: 79
End: 2024-09-09
Payer: MEDICARE

## 2024-09-09 ENCOUNTER — ANESTHESIA EVENT CONVERTED (OUTPATIENT)
Dept: ANESTHESIOLOGY | Facility: HOSPITAL | Age: 79
End: 2024-09-09
Payer: MEDICARE

## 2024-09-09 ENCOUNTER — HOSPITAL ENCOUNTER (OUTPATIENT)
Facility: HOSPITAL | Age: 79
Discharge: HOME | End: 2024-09-09
Attending: ORTHOPAEDIC SURGERY | Admitting: ORTHOPAEDIC SURGERY
Payer: MEDICARE

## 2024-09-09 ENCOUNTER — APPOINTMENT (OUTPATIENT)
Dept: GENERAL RADIOLOGY | Facility: HOSPITAL | Age: 79
End: 2024-09-09
Payer: MEDICARE

## 2024-09-09 VITALS
OXYGEN SATURATION: 94 % | RESPIRATION RATE: 16 BRPM | HEIGHT: 66 IN | WEIGHT: 225 LBS | TEMPERATURE: 97.5 F | SYSTOLIC BLOOD PRESSURE: 154 MMHG | DIASTOLIC BLOOD PRESSURE: 91 MMHG | BODY MASS INDEX: 36.16 KG/M2 | HEART RATE: 76 BPM

## 2024-09-09 DIAGNOSIS — M17.12 PRIMARY OSTEOARTHRITIS OF LEFT KNEE: ICD-10-CM

## 2024-09-09 DIAGNOSIS — Z96.652 S/P TKR (TOTAL KNEE REPLACEMENT), LEFT: Primary | ICD-10-CM

## 2024-09-09 LAB
GLUCOSE BLDC GLUCOMTR-MCNC: 96 MG/DL (ref 70–130)
POTASSIUM SERPL-SCNC: 3.6 MMOL/L (ref 3.5–5.2)

## 2024-09-09 PROCEDURE — 25810000003 LACTATED RINGERS PER 1000 ML: Performed by: ORTHOPAEDIC SURGERY

## 2024-09-09 PROCEDURE — 63710000001 POVIDONE-IODINE 10 % SOLUTION 30 ML BOTTLE: Performed by: ORTHOPAEDIC SURGERY

## 2024-09-09 PROCEDURE — 25010000002 CEFAZOLIN PER 500 MG: Performed by: ORTHOPAEDIC SURGERY

## 2024-09-09 PROCEDURE — 25810000003 SODIUM CHLORIDE 0.9 % SOLUTION: Performed by: ANESTHESIOLOGY

## 2024-09-09 PROCEDURE — G0378 HOSPITAL OBSERVATION PER HR: HCPCS

## 2024-09-09 PROCEDURE — 27447 TOTAL KNEE ARTHROPLASTY: CPT

## 2024-09-09 PROCEDURE — C1776 JOINT DEVICE (IMPLANTABLE): HCPCS | Performed by: ORTHOPAEDIC SURGERY

## 2024-09-09 PROCEDURE — 82948 REAGENT STRIP/BLOOD GLUCOSE: CPT

## 2024-09-09 PROCEDURE — 25010000002 PROPOFOL 10 MG/ML EMULSION

## 2024-09-09 PROCEDURE — 27447 TOTAL KNEE ARTHROPLASTY: CPT | Performed by: ORTHOPAEDIC SURGERY

## 2024-09-09 PROCEDURE — 97161 PT EVAL LOW COMPLEX 20 MIN: CPT

## 2024-09-09 PROCEDURE — 25010000002 BUPIVACAINE 0.5 % SOLUTION: Performed by: ANESTHESIOLOGY

## 2024-09-09 PROCEDURE — A9270 NON-COVERED ITEM OR SERVICE: HCPCS | Performed by: ORTHOPAEDIC SURGERY

## 2024-09-09 PROCEDURE — 63710000001 FAMOTIDINE 20 MG TABLET: Performed by: ANESTHESIOLOGY

## 2024-09-09 PROCEDURE — 63710000001 ACETAMINOPHEN EXTRA STRENGTH 500 MG TABLET: Performed by: ORTHOPAEDIC SURGERY

## 2024-09-09 PROCEDURE — 25010000002 ONDANSETRON PER 1 MG

## 2024-09-09 PROCEDURE — 63710000001 PREGABALIN 75 MG CAPSULE: Performed by: ORTHOPAEDIC SURGERY

## 2024-09-09 PROCEDURE — 63710000001 OXYCODONE 10 MG TABLET: Performed by: ORTHOPAEDIC SURGERY

## 2024-09-09 PROCEDURE — 25010000002 ROPIVACAINE HCL-NACL 0.2-0.9 % SOLUTION: Performed by: ORTHOPAEDIC SURGERY

## 2024-09-09 PROCEDURE — 25010000002 KETOROLAC TROMETHAMINE PER 15 MG: Performed by: ORTHOPAEDIC SURGERY

## 2024-09-09 PROCEDURE — A9270 NON-COVERED ITEM OR SERVICE: HCPCS | Performed by: ANESTHESIOLOGY

## 2024-09-09 PROCEDURE — C1713 ANCHOR/SCREW BN/BN,TIS/BN: HCPCS | Performed by: ORTHOPAEDIC SURGERY

## 2024-09-09 PROCEDURE — 73560 X-RAY EXAM OF KNEE 1 OR 2: CPT

## 2024-09-09 PROCEDURE — 0055T BONE SRGRY CMPTR CT/MRI IMAG: CPT | Performed by: ORTHOPAEDIC SURGERY

## 2024-09-09 PROCEDURE — C1755 CATHETER, INTRASPINAL: HCPCS | Performed by: ORTHOPAEDIC SURGERY

## 2024-09-09 PROCEDURE — A4648 IMPLANTABLE TISSUE MARKER: HCPCS | Performed by: ORTHOPAEDIC SURGERY

## 2024-09-09 PROCEDURE — 25010000002 BUPIVACAINE (PF) 0.25 % SOLUTION

## 2024-09-09 PROCEDURE — 97116 GAIT TRAINING THERAPY: CPT

## 2024-09-09 PROCEDURE — 25010000002 ROPIVACAINE PER 1 MG: Performed by: ORTHOPAEDIC SURGERY

## 2024-09-09 PROCEDURE — 25010000002 MORPHINE PER 10 MG: Performed by: ORTHOPAEDIC SURGERY

## 2024-09-09 PROCEDURE — 20985 CPTR-ASST DIR MS PX: CPT

## 2024-09-09 PROCEDURE — 25010000002 ROPIVACAINE HCL-NACL 0.2-0.9 % SOLUTION

## 2024-09-09 PROCEDURE — 20985 CPTR-ASST DIR MS PX: CPT | Performed by: ORTHOPAEDIC SURGERY

## 2024-09-09 PROCEDURE — 25810000003 LACTATED RINGERS PER 1000 ML: Performed by: ANESTHESIOLOGY

## 2024-09-09 PROCEDURE — 63710000001 CELECOXIB 200 MG CAPSULE: Performed by: ORTHOPAEDIC SURGERY

## 2024-09-09 PROCEDURE — 84132 ASSAY OF SERUM POTASSIUM: CPT | Performed by: ANESTHESIOLOGY

## 2024-09-09 PROCEDURE — 25810000003 SODIUM CHLORIDE 0.9 % SOLUTION: Performed by: ORTHOPAEDIC SURGERY

## 2024-09-09 PROCEDURE — 25010000002 DEXAMETHASONE PER 1 MG

## 2024-09-09 DEVICE — PAT TRIATH TRITANIUM 35X39X10MM: Type: IMPLANTABLE DEVICE | Site: KNEE | Status: FUNCTIONAL

## 2024-09-09 DEVICE — IMPLANTABLE DEVICE: Type: IMPLANTABLE DEVICE | Site: KNEE | Status: FUNCTIONAL

## 2024-09-09 DEVICE — CMT BONE SIMPLEX/P FULL DOSE 10/PK: Type: IMPLANTABLE DEVICE | Site: KNEE | Status: FUNCTIONAL

## 2024-09-09 DEVICE — BASEPLT TIB TRIATH TRITANIUM SZ6: Type: IMPLANTABLE DEVICE | Site: KNEE | Status: FUNCTIONAL

## 2024-09-09 DEVICE — DEV CONTRL TISS STRATAFIX SPIRAL PDO BIDIR 1 36X36CM: Type: IMPLANTABLE DEVICE | Site: KNEE | Status: FUNCTIONAL

## 2024-09-09 DEVICE — COMP FEM TRIATH CR NO5 LT: Type: IMPLANTABLE DEVICE | Site: KNEE | Status: FUNCTIONAL

## 2024-09-09 RX ORDER — CELECOXIB 200 MG/1
200 CAPSULE ORAL ONCE
Status: COMPLETED | OUTPATIENT
Start: 2024-09-09 | End: 2024-09-09

## 2024-09-09 RX ORDER — TRANEXAMIC ACID 10 MG/ML
1000 INJECTION, SOLUTION INTRAVENOUS ONCE
Status: DISCONTINUED | OUTPATIENT
Start: 2024-09-09 | End: 2024-09-09 | Stop reason: HOSPADM

## 2024-09-09 RX ORDER — FENTANYL CITRATE 50 UG/ML
50 INJECTION, SOLUTION INTRAMUSCULAR; INTRAVENOUS
Status: DISCONTINUED | OUTPATIENT
Start: 2024-09-09 | End: 2024-09-09 | Stop reason: HOSPADM

## 2024-09-09 RX ORDER — ACETAMINOPHEN 500 MG
1000 TABLET ORAL EVERY 8 HOURS
Status: DISCONTINUED | OUTPATIENT
Start: 2024-09-09 | End: 2024-09-09 | Stop reason: HOSPADM

## 2024-09-09 RX ORDER — SODIUM CHLORIDE 0.9 % (FLUSH) 0.9 %
3 SYRINGE (ML) INJECTION EVERY 12 HOURS SCHEDULED
Status: DISCONTINUED | OUTPATIENT
Start: 2024-09-09 | End: 2024-09-09 | Stop reason: HOSPADM

## 2024-09-09 RX ORDER — BUPIVACAINE HYDROCHLORIDE 2.5 MG/ML
INJECTION, SOLUTION EPIDURAL; INFILTRATION; INTRACAUDAL
Status: DISCONTINUED | OUTPATIENT
Start: 2024-09-09 | End: 2024-09-09 | Stop reason: SURG

## 2024-09-09 RX ORDER — ENOXAPARIN SODIUM 100 MG/ML
40 INJECTION SUBCUTANEOUS DAILY
Status: DISCONTINUED | OUTPATIENT
Start: 2024-09-10 | End: 2024-09-09 | Stop reason: HOSPADM

## 2024-09-09 RX ORDER — SODIUM CHLORIDE, SODIUM LACTATE, POTASSIUM CHLORIDE, CALCIUM CHLORIDE 600; 310; 30; 20 MG/100ML; MG/100ML; MG/100ML; MG/100ML
9 INJECTION, SOLUTION INTRAVENOUS CONTINUOUS
Status: DISCONTINUED | OUTPATIENT
Start: 2024-09-09 | End: 2024-09-09 | Stop reason: HOSPADM

## 2024-09-09 RX ORDER — NALOXONE HCL 0.4 MG/ML
0.4 VIAL (ML) INJECTION AS NEEDED
Status: DISCONTINUED | OUTPATIENT
Start: 2024-09-09 | End: 2024-09-09 | Stop reason: HOSPADM

## 2024-09-09 RX ORDER — ROPIVACAINE HYDROCHLORIDE 2 MG/ML
INJECTION, SOLUTION EPIDURAL; INFILTRATION; PERINEURAL CONTINUOUS
Status: DISCONTINUED | OUTPATIENT
Start: 2024-09-09 | End: 2024-09-09 | Stop reason: HOSPADM

## 2024-09-09 RX ORDER — SODIUM CHLORIDE 9 MG/ML
9 INJECTION, SOLUTION INTRAVENOUS ONCE
Status: COMPLETED | OUTPATIENT
Start: 2024-09-09 | End: 2024-09-09

## 2024-09-09 RX ORDER — NALOXONE HCL 0.4 MG/ML
0.1 VIAL (ML) INJECTION
Status: DISCONTINUED | OUTPATIENT
Start: 2024-09-09 | End: 2024-09-09 | Stop reason: HOSPADM

## 2024-09-09 RX ORDER — ONDANSETRON 2 MG/ML
4 INJECTION INTRAMUSCULAR; INTRAVENOUS EVERY 6 HOURS PRN
Status: DISCONTINUED | OUTPATIENT
Start: 2024-09-09 | End: 2024-09-09 | Stop reason: HOSPADM

## 2024-09-09 RX ORDER — DOCUSATE SODIUM 100 MG/1
100 CAPSULE, LIQUID FILLED ORAL 2 TIMES DAILY
Qty: 30 CAPSULE | Refills: 0 | Status: SHIPPED | OUTPATIENT
Start: 2024-09-09 | End: 2024-09-24

## 2024-09-09 RX ORDER — DEXAMETHASONE SODIUM PHOSPHATE 4 MG/ML
INJECTION, SOLUTION INTRA-ARTICULAR; INTRALESIONAL; INTRAMUSCULAR; INTRAVENOUS; SOFT TISSUE AS NEEDED
Status: DISCONTINUED | OUTPATIENT
Start: 2024-09-09 | End: 2024-09-09 | Stop reason: SURG

## 2024-09-09 RX ORDER — EPHEDRINE SULFATE 50 MG/ML
INJECTION INTRAVENOUS AS NEEDED
Status: DISCONTINUED | OUTPATIENT
Start: 2024-09-09 | End: 2024-09-09 | Stop reason: SURG

## 2024-09-09 RX ORDER — ONDANSETRON 4 MG/1
4 TABLET, ORALLY DISINTEGRATING ORAL EVERY 6 HOURS PRN
Status: DISCONTINUED | OUTPATIENT
Start: 2024-09-09 | End: 2024-09-09 | Stop reason: HOSPADM

## 2024-09-09 RX ORDER — OXYCODONE HYDROCHLORIDE 5 MG/1
5 TABLET ORAL EVERY 4 HOURS PRN
Status: DISCONTINUED | OUTPATIENT
Start: 2024-09-09 | End: 2024-09-09 | Stop reason: HOSPADM

## 2024-09-09 RX ORDER — ONDANSETRON 2 MG/ML
4 INJECTION INTRAMUSCULAR; INTRAVENOUS ONCE AS NEEDED
Status: DISCONTINUED | OUTPATIENT
Start: 2024-09-09 | End: 2024-09-09 | Stop reason: HOSPADM

## 2024-09-09 RX ORDER — ROPIVACAINE HYDROCHLORIDE 2 MG/ML
1 INJECTION, SOLUTION EPIDURAL; INFILTRATION; PERINEURAL CONTINUOUS
Status: CANCELLED
Start: 2024-09-09

## 2024-09-09 RX ORDER — MIDAZOLAM HYDROCHLORIDE 1 MG/ML
0.5 INJECTION INTRAMUSCULAR; INTRAVENOUS
Status: DISCONTINUED | OUTPATIENT
Start: 2024-09-09 | End: 2024-09-09 | Stop reason: HOSPADM

## 2024-09-09 RX ORDER — SODIUM CHLORIDE 9 MG/ML
100 INJECTION, SOLUTION INTRAVENOUS CONTINUOUS
Status: DISCONTINUED | OUTPATIENT
Start: 2024-09-09 | End: 2024-09-09 | Stop reason: HOSPADM

## 2024-09-09 RX ORDER — SODIUM CHLORIDE 0.9 % (FLUSH) 0.9 %
3-10 SYRINGE (ML) INJECTION AS NEEDED
Status: DISCONTINUED | OUTPATIENT
Start: 2024-09-09 | End: 2024-09-09 | Stop reason: HOSPADM

## 2024-09-09 RX ORDER — PROPOFOL 10 MG/ML
VIAL (ML) INTRAVENOUS AS NEEDED
Status: DISCONTINUED | OUTPATIENT
Start: 2024-09-09 | End: 2024-09-09 | Stop reason: SURG

## 2024-09-09 RX ORDER — ACETAMINOPHEN 500 MG
1000 TABLET ORAL EVERY 8 HOURS
Qty: 60 TABLET | Refills: 0 | Status: SHIPPED | OUTPATIENT
Start: 2024-09-09 | End: 2024-09-19

## 2024-09-09 RX ORDER — DROPERIDOL 2.5 MG/ML
0.62 INJECTION, SOLUTION INTRAMUSCULAR; INTRAVENOUS ONCE AS NEEDED
Status: DISCONTINUED | OUTPATIENT
Start: 2024-09-09 | End: 2024-09-09 | Stop reason: HOSPADM

## 2024-09-09 RX ORDER — IPRATROPIUM BROMIDE AND ALBUTEROL SULFATE 2.5; .5 MG/3ML; MG/3ML
3 SOLUTION RESPIRATORY (INHALATION) ONCE AS NEEDED
Status: DISCONTINUED | OUTPATIENT
Start: 2024-09-09 | End: 2024-09-09 | Stop reason: HOSPADM

## 2024-09-09 RX ORDER — FAMOTIDINE 20 MG/1
20 TABLET, FILM COATED ORAL ONCE
Status: COMPLETED | OUTPATIENT
Start: 2024-09-09 | End: 2024-09-09

## 2024-09-09 RX ORDER — OXYCODONE HYDROCHLORIDE 5 MG/1
5 TABLET ORAL EVERY 4 HOURS PRN
Qty: 30 TABLET | Refills: 0 | Status: SHIPPED | OUTPATIENT
Start: 2024-09-09

## 2024-09-09 RX ORDER — LABETALOL HYDROCHLORIDE 5 MG/ML
5 INJECTION, SOLUTION INTRAVENOUS
Status: DISCONTINUED | OUTPATIENT
Start: 2024-09-09 | End: 2024-09-09 | Stop reason: HOSPADM

## 2024-09-09 RX ORDER — MAGNESIUM HYDROXIDE 1200 MG/15ML
LIQUID ORAL AS NEEDED
Status: DISCONTINUED | OUTPATIENT
Start: 2024-09-09 | End: 2024-09-09 | Stop reason: HOSPADM

## 2024-09-09 RX ORDER — SODIUM CHLORIDE 9 MG/ML
40 INJECTION, SOLUTION INTRAVENOUS AS NEEDED
Status: DISCONTINUED | OUTPATIENT
Start: 2024-09-09 | End: 2024-09-09 | Stop reason: HOSPADM

## 2024-09-09 RX ORDER — BUPIVACAINE HYDROCHLORIDE 5 MG/ML
INJECTION, SOLUTION PERINEURAL
Status: COMPLETED | OUTPATIENT
Start: 2024-09-09 | End: 2024-09-09

## 2024-09-09 RX ORDER — HYDRALAZINE HYDROCHLORIDE 20 MG/ML
5 INJECTION INTRAMUSCULAR; INTRAVENOUS
Status: DISCONTINUED | OUTPATIENT
Start: 2024-09-09 | End: 2024-09-09 | Stop reason: HOSPADM

## 2024-09-09 RX ORDER — LIDOCAINE HYDROCHLORIDE 10 MG/ML
0.5 INJECTION, SOLUTION EPIDURAL; INFILTRATION; INTRACAUDAL; PERINEURAL ONCE AS NEEDED
Status: COMPLETED | OUTPATIENT
Start: 2024-09-09 | End: 2024-09-09

## 2024-09-09 RX ORDER — ROPIVACAINE HYDROCHLORIDE 2 MG/ML
1 INJECTION, SOLUTION EPIDURAL; INFILTRATION; PERINEURAL CONTINUOUS
Start: 2024-09-09

## 2024-09-09 RX ORDER — LABETALOL HYDROCHLORIDE 5 MG/ML
10 INJECTION, SOLUTION INTRAVENOUS EVERY 4 HOURS PRN
Status: DISCONTINUED | OUTPATIENT
Start: 2024-09-09 | End: 2024-09-09 | Stop reason: HOSPADM

## 2024-09-09 RX ORDER — ASPIRIN 81 MG/1
81 TABLET ORAL DAILY
Start: 2024-09-10

## 2024-09-09 RX ORDER — TRANEXAMIC ACID 10 MG/ML
1000 INJECTION, SOLUTION INTRAVENOUS ONCE
Status: COMPLETED | OUTPATIENT
Start: 2024-09-09 | End: 2024-09-09

## 2024-09-09 RX ORDER — HYDROMORPHONE HYDROCHLORIDE 1 MG/ML
0.5 INJECTION, SOLUTION INTRAMUSCULAR; INTRAVENOUS; SUBCUTANEOUS
Status: DISCONTINUED | OUTPATIENT
Start: 2024-09-09 | End: 2024-09-09 | Stop reason: HOSPADM

## 2024-09-09 RX ORDER — MELOXICAM 15 MG/1
15 TABLET ORAL ONCE
Status: DISCONTINUED | OUTPATIENT
Start: 2024-09-09 | End: 2024-09-09

## 2024-09-09 RX ORDER — DROPERIDOL 2.5 MG/ML
0.62 INJECTION, SOLUTION INTRAMUSCULAR; INTRAVENOUS
Status: DISCONTINUED | OUTPATIENT
Start: 2024-09-09 | End: 2024-09-09 | Stop reason: HOSPADM

## 2024-09-09 RX ORDER — ONDANSETRON 2 MG/ML
INJECTION INTRAMUSCULAR; INTRAVENOUS AS NEEDED
Status: DISCONTINUED | OUTPATIENT
Start: 2024-09-09 | End: 2024-09-09 | Stop reason: SURG

## 2024-09-09 RX ORDER — PREGABALIN 75 MG/1
75 CAPSULE ORAL ONCE
Status: COMPLETED | OUTPATIENT
Start: 2024-09-09 | End: 2024-09-09

## 2024-09-09 RX ORDER — OXYCODONE HYDROCHLORIDE 10 MG/1
10 TABLET ORAL EVERY 4 HOURS PRN
Status: DISCONTINUED | OUTPATIENT
Start: 2024-09-09 | End: 2024-09-09 | Stop reason: HOSPADM

## 2024-09-09 RX ORDER — LIDOCAINE HYDROCHLORIDE 10 MG/ML
INJECTION, SOLUTION EPIDURAL; INFILTRATION; INTRACAUDAL; PERINEURAL AS NEEDED
Status: DISCONTINUED | OUTPATIENT
Start: 2024-09-09 | End: 2024-09-09 | Stop reason: SURG

## 2024-09-09 RX ORDER — ACETAMINOPHEN 500 MG
1000 TABLET ORAL ONCE
Status: COMPLETED | OUTPATIENT
Start: 2024-09-09 | End: 2024-09-09

## 2024-09-09 RX ADMIN — OXYCODONE HYDROCHLORIDE 10 MG: 10 TABLET ORAL at 18:52

## 2024-09-09 RX ADMIN — ACETAMINOPHEN 1000 MG: 500 TABLET ORAL at 18:52

## 2024-09-09 RX ADMIN — LIDOCAINE HYDROCHLORIDE 50 MG: 10 INJECTION, SOLUTION EPIDURAL; INFILTRATION; INTRACAUDAL; PERINEURAL at 12:08

## 2024-09-09 RX ADMIN — TRANEXAMIC ACID 1000 MG: 10 INJECTION, SOLUTION INTRAVENOUS at 13:49

## 2024-09-09 RX ADMIN — Medication 1000 MG: at 15:50

## 2024-09-09 RX ADMIN — EPHEDRINE SULFATE 10 MG: 50 INJECTION INTRAVENOUS at 12:39

## 2024-09-09 RX ADMIN — ACETAMINOPHEN 1000 MG: 500 TABLET ORAL at 10:58

## 2024-09-09 RX ADMIN — SODIUM CHLORIDE, POTASSIUM CHLORIDE, SODIUM LACTATE AND CALCIUM CHLORIDE: 600; 310; 30; 20 INJECTION, SOLUTION INTRAVENOUS at 12:05

## 2024-09-09 RX ADMIN — PREGABALIN 75 MG: 75 CAPSULE ORAL at 10:58

## 2024-09-09 RX ADMIN — SODIUM CHLORIDE 2 G: 900 INJECTION INTRAVENOUS at 17:34

## 2024-09-09 RX ADMIN — PROPOFOL 100 MCG/KG/MIN: 10 INJECTION, EMULSION INTRAVENOUS at 12:10

## 2024-09-09 RX ADMIN — CELECOXIB 200 MG: 200 CAPSULE ORAL at 11:46

## 2024-09-09 RX ADMIN — SODIUM CHLORIDE 9 ML/HR: 9 INJECTION, SOLUTION INTRAVENOUS at 10:45

## 2024-09-09 RX ADMIN — PROPOFOL 30 MG: 10 INJECTION, EMULSION INTRAVENOUS at 12:08

## 2024-09-09 RX ADMIN — ONDANSETRON 4 MG: 2 INJECTION INTRAMUSCULAR; INTRAVENOUS at 12:16

## 2024-09-09 RX ADMIN — FAMOTIDINE 20 MG: 20 TABLET, FILM COATED ORAL at 10:58

## 2024-09-09 RX ADMIN — SODIUM CHLORIDE 100 ML/HR: 9 INJECTION, SOLUTION INTRAVENOUS at 17:30

## 2024-09-09 RX ADMIN — SODIUM CHLORIDE 2 G: 900 INJECTION INTRAVENOUS at 12:12

## 2024-09-09 RX ADMIN — BUPIVACAINE HYDROCHLORIDE 20 ML: 2.5 INJECTION, SOLUTION EPIDURAL; INFILTRATION; INTRACAUDAL; PERINEURAL at 15:25

## 2024-09-09 RX ADMIN — BUPIVACAINE HYDROCHLORIDE 1.8 ML: 5 INJECTION, SOLUTION PERINEURAL at 12:09

## 2024-09-09 RX ADMIN — LIDOCAINE HYDROCHLORIDE 0.5 ML: 10 INJECTION, SOLUTION EPIDURAL; INFILTRATION; INTRACAUDAL; PERINEURAL at 10:35

## 2024-09-09 RX ADMIN — EPHEDRINE SULFATE 10 MG: 50 INJECTION INTRAVENOUS at 12:57

## 2024-09-09 RX ADMIN — TRANEXAMIC ACID 1000 MG: 10 INJECTION, SOLUTION INTRAVENOUS at 12:15

## 2024-09-09 RX ADMIN — DEXAMETHASONE SODIUM PHOSPHATE 4 MG: 4 INJECTION INTRA-ARTICULAR; INTRALESIONAL; INTRAMUSCULAR; INTRAVENOUS; SOFT TISSUE at 12:13

## 2024-09-09 RX ADMIN — SODIUM CHLORIDE, POTASSIUM CHLORIDE, SODIUM LACTATE AND CALCIUM CHLORIDE: 600; 310; 30; 20 INJECTION, SOLUTION INTRAVENOUS at 13:30

## 2024-09-09 NOTE — ANESTHESIA PROCEDURE NOTES
Spinal Block      Patient reassessed immediately prior to procedure    Patient location during procedure: OR  Indication:at surgeon's request  Performed By  CRNA/CAA: Raghu Devlin CRNA  Preanesthetic Checklist  Completed: patient identified, IV checked, site marked, risks and benefits discussed, surgical consent, monitors and equipment checked, pre-op evaluation and timeout performed  Spinal Block Prep:  Patient Position:sitting  Sterile Tech:cap, gloves, sterile barriers and mask  Prep:Chloraprep  Patient Monitoring:blood pressure monitoring, continuous pulse oximetry and EKG    Spinal Block Procedure  Approach:midline  Guidance:landmark technique and palpation technique  Location:L4-L5  Needle Type:Quincke  Needle Gauge:22 G  Placement of Spinal needle event:cerebrospinal fluid aspirated  Paresthesia: no  Fluid Appearance:clear  Medications: bupivacaine (MARCAINE) 0.5 % injection - Injection   1.8 mL - 9/9/2024 12:09:00 PM   Post Assessment  Patient Tolerance:patient tolerated the procedure well with no apparent complications  Complications no  Additional Notes  Procedure:  Pt assisted to sitting position, with legs in position of comfort over side of bed.  Pt. instructed in optimal spine presentation, the spine was prepped/ Draped and the skin at insertion site was anesthetized with 1% Lidocaine 2 ml.  The spinal needle was then advanced until CSF flow was obtained and LA was injected:

## 2024-09-09 NOTE — H&P
Patient Name: Ángel Parikh  MRN: [id number]  : 1945  DOS: 2024    Attending: Perez Hull MD    Primary Care Provider: Perez Pompa MD      Chief complaint: Left knee Pain.    Subjective   Patient is a pleasant 78 y.o. male presented for scheduled surgery by Dr. Hull.    Per his note (The patient has a long history of progressive knee pain, arthritis, and degeneration resulting in deformity in the left knee from predominantly medial wear and bone loss. Non-operative treatment and conservative therapeutic measures have been attempted, but have not improved or controlled the symptoms and pain that occurs during normal daily activities. Knee motion has also become limited and is restricting the patient. Total knee arthroplasty was recommended.).    Patient underwent left total knee arthroplasty under spinal anesthesia and periarticular block, tolerated surgery well.  Adductor canal nerve block catheter was placed by acute pain service.    Seen postop, doing well, no complains of nausea, vomiting, or shortness of breath.      Patient is on chronic anticoagulation with Xarelto that he had held for surgery.        Allergies:  Allergies   Allergen Reactions    Levofloxacin Itching and Rash    Meloxicam Swelling    Percocet [Oxycodone-Acetaminophen] Rash     Chest and back red       Meds:  Medications Prior to Admission   Medication Sig Dispense Refill Last Dose    aspirin 81 MG EC tablet Take 1 tablet by mouth Daily. Stopped for surgery   2024 at 1000    atorvastatin (LIPITOR) 20 MG tablet Take 1 tablet by mouth Every Night. 90 tablet 3 2024 at 2230    Chlorhexidine Gluconate 4 % solution Shower daily with hibiclens solution as directed 5 days prior to surgery. 236 mL 0 2024    doxazosin (CARDURA) 4 MG tablet Take 1 tablet by mouth Every Night. 90 tablet 2 2024 at 2230    Entresto 24-26 MG tablet Take 1 tablet by mouth 2 (Two) Times a Day. 180 tablet 2 2024 at 2230     fluticasone (FLONASE) 50 MCG/ACT nasal spray 2 sprays into the nostril(s) as directed by provider Daily.   Past Week    metoprolol tartrate (LOPRESSOR) 25 MG tablet Take 1 tablet by mouth 2 (Two) Times a Day. 180 tablet 2 9/8/2024 at 2230    multivitamin with minerals (MULTIVITAMIN ADULT PO) Take 1 tablet by mouth Daily.   9/9/2024 at 0730    pantoprazole (PROTONIX) 40 MG EC tablet Take 1 tablet by mouth Daily. 90 tablet 2 9/8/2024    Vibegron (Gemtesa) 75 MG tablet Take 1 tablet by mouth Daily.   9/9/2024 at 0730    rivaroxaban (XARELTO) 20 MG tablet Take 1 tablet by mouth Daily. 90 tablet 3 9/5/2024         History:   Past Medical History:   Diagnosis Date    Anemia 11/22/2005    Arthritis     multi joint jose left knee     Cancer     prostate per elevated PSA and biopsies     CHF (congestive heart failure)     Colonic polyp     Elevated PSA     Encounter for screening colonoscopy     Essential hypertension     Gastroesophageal reflux disease without esophagitis     H/O valvular heart disease     3 leaky valves- monitored by FMD and Dr Scott (sees every 6 months)    Hearing loss     hearing aids in both ears    Heart murmur     Hemorrhoid     bleeds on occasion     Hiatal hernia     High risk medication use     History of cellulitis     History of tobacco use     QUIT AT AGE 51    Hypercholesterolemia     Hyperglycemia     Hyperlipidemia     Knee swelling     Morbid obesity     Overactive bladder     Psoriasis     spots on legs     Restless leg syndrome     Sleep apnea with use of continuous positive airway pressure (CPAP)     10 auto setting    Visual impairment     Vitamin B12 deficiency     Vitamin D deficiency     Wears glasses     Wears partial dentures      Past Surgical History:   Procedure Laterality Date    CARDIAC CATHETERIZATION N/A 01/08/2021    Procedure: Left Heart Cath 51093;  Surgeon: Reggie Scott MD;  Location: [facility] CATH INVASIVE LOCATION;  Service: Cardiovascular;  Laterality: N/A;     "CARDIAC ELECTROPHYSIOLOGY PROCEDURE N/A 2024    Procedure: Ablation atrial fibrillation (PFA). DNS Xarelto. Hold Flecainide 3 days prior.;  Surgeon: Luis Atwood DO;  Location:  [facility] INVASIVE LOCATION;  Service: Cardiovascular;  Laterality: N/A;    COLONOSCOPY  11/10/2015    MOD SIGMOID DIVERTICULOSIS, NO POLYPS. PLAN: FIBER, REPEAT IN 10 YEARS.    COLONOSCOPY  2005    DIVERTICULOSIS, INT HEMERRHOIDS. NO POLYPS. EGD- REFLUX, NO BLEEDING. FOLLOW UP CAPSULE ENDOSCOPY AT  -NORMAL    CYSTOSCOPY AND NEEDLE BIOPSY PROSTATE      twice; last time     PROSTATECTOMY N/A 10/03/2017    Procedure: PROSTATECTOMY LAPAROSCOPIC WITH DAVINCI ROBOT;  Surgeon: Jameel Ferguson Jr., MD;  Location: [facility] OR;  Service:      Family History   Problem Relation Age of Onset    Stroke Mother     Hypertension Mother     Heart attack Father     Hypertension Father     Cirrhosis Brother      Social History     Tobacco Use    Smoking status: Former     Current packs/day: 0.00     Average packs/day: 1 pack/day for 15.0 years (15.0 ttl pk-yrs)     Types: Cigarettes, Pipe, Cigars     Start date: 1990     Quit date: 2005     Years since quittin.7     Passive exposure: Past    Smokeless tobacco: Never    Tobacco comments:     smoked 3 cigars a day and pipe off and on    Vaping Use    Vaping status: Never Used    Passive vaping exposure: Yes   Substance Use Topics    Alcohol use: Not Currently    Drug use: Never   , retired.    Review of Systems  Pertinent items are noted in HPI    Vital Signs  /91 (BP Location: Left arm, Patient Position: Lying)   Pulse 76   Temp 97.5 °F (36.4 °C) (Oral)   Resp 16   Ht 167.6 cm (66\")   Wt 102 kg (225 lb)   SpO2 94%   BMI 36.32 kg/m²     Physical Exam:    General Appearance:    Alert, cooperative, in no acute distress   Head:    Normocephalic, without obvious abnormality, atraumatic   Eyes:            Lids and lashes normal, conjunctivae and sclerae " "normal, no   icterus, no pallor, corneas clear    Ears:    Ears appear intact with no abnormalities noted   Throat:   No oral lesions, no thrush, oral mucosa moist   Neck:   No adenopathy, supple, trachea midline, no thyromegaly         Lungs:     Clear to auscultation,respirations regular, even and                   unlabored    Heart:    Regular rhythm and normal rate, normal S1 and S2, no       murmur, no gallop   Abdomen:     Normal bowel sounds, no masses, no organomegaly, soft        nontender, nondistended, no guarding, no rebound                 tenderness   Genitalia:    Deferred   Extremities: Left LE, CDI dressing on knee, PNB cath present.    Pulses:   Pulses palpable and equal bilaterally   Skin:   No bleeding, bruising or rash   Neurologic:   Cranial nerves 2 - 12 grossly intact, intact flexion and dorsiflexion bilateral feet      I reviewed the patient's new clinical results.             Invalid input(s): \"NEUTOPHILPCT\"  Results from last 7 days   Lab Units 09/09/24  1014   POTASSIUM mmol/L 3.6     Lab Results   Component Value Date    HGBA1C 5.20 08/26/2024      Latest Reference Range & Units 08/26/24 14:29   Sodium 136 - 145 mmol/L 141   Potassium 3.5 - 5.2 mmol/L 4.3   Chloride 98 - 107 mmol/L 105   CO2 22.0 - 29.0 mmol/L 27.0   Anion Gap 5.0 - 15.0 mmol/L 9.0   BUN 8 - 23 mg/dL 16   Creatinine 0.76 - 1.27 mg/dL 0.92   BUN/Creatinine Ratio 7.0 - 25.0  17.4   eGFR >60.0 mL/min/1.73 85.7   Glucose 65 - 99 mg/dL 104 (H)   Calcium 8.6 - 10.5 mg/dL 8.7   Alkaline Phosphatase 39 - 117 U/L 96   Total Protein 6.0 - 8.5 g/dL 6.7   Albumin 3.5 - 5.2 g/dL 4.0   Globulin gm/dL 2.7   A/G Ratio g/dL 1.5   AST (SGOT) 1 - 40 U/L 19   ALT (SGPT) 1 - 41 U/L 13   Total Bilirubin 0.0 - 1.2 mg/dL 0.3   (H): Data is abnormally high   Latest Reference Range & Units 08/26/24 14:29   WBC 3.40 - 10.80 10*3/mm3 5.73   RBC 4.14 - 5.80 10*6/mm3 4.24   Hemoglobin 13.0 - 17.7 g/dL 13.5   Hematocrit 37.5 - 51.0 % 40.6   Platelets " 140 - 450 10*3/mm3 173   RDW 12.3 - 15.4 % 13.2   MCV 79.0 - 97.0 fL 95.8   MCH 26.6 - 33.0 pg 31.8   MCHC 31.5 - 35.7 g/dL 33.3   MPV 6.0 - 12.0 fL 10.0   RDW-SD 37.0 - 54.0 fl 46.4       Assessment and Plan:       S/P TKR (total knee replacement), left    HLD (hyperlipidemia)    MAE on CPAP    Atrial fibrillation, persistent    Chronic anticoagulation    OAB (overactive bladder)    Hyperthyroidism    Chronic heart failure with preserved ejection fraction (HFpEF)    Arthritis of knee      Plan  1. PT/OT, protected weight bearing as tolerated left LE  2. Pain control-prns, ACB cath with ropivacaine infusion.  3. IS-encourage  4. DVT proph- Mechanicals and Xarelto, starting at 10 mg daily postop day 1 for 4 days before resuming home dose.  5. Bowel regimen  6. Resume home medications as appropriate  7. DC planning for home    Patient is very motivated to work with physical therapy and achieve  mobility and pain control among other goals for possible discharge home later in the day.    We reviewed these goals and discussed with patient tracking  progress for the next few hours and if all is achieved to receive next antibiotic prophylactic dose and be discharged home.     We discussed medications and precriptions at time of discharge including DVT prophylaxis, pain control, and bowel regimen.  All questions were answered .    Patient expressed understanding and agreement.    Dragon disclaimer:  Part of this encounter note is an electronic transcription/translation of spoken language to printed text. The electronic translation of spoken language may permit erroneous, or at times, nonsensical words or phrases to be inadvertently transcribed; Although I have reviewed the note for such errors, some may still exist.    Ginna Hall MD  09/09/24  16:53 EDT

## 2024-09-09 NOTE — OP NOTE
OPERATIVE REPORT     DATE OF PROCEDURE: 9/9/2024    SURGEON: Perez Hull M.D.     ASSISTANT(S): Circulator: Nataly Dan RN; Dylan Tucker RN  Scrub Person: Sonia Altamirano; Lalita Mederos  Nursing Assistant: Tayla Brewster PCT; Julia Trevino  Assistant: Dylan Manzano PA-C  Assistant: Dylan Manzano PA-C    Note-PA was utilized during the case to facilitate positioning the patient, exposure, retraction, placement of final components and definitive closure.    PREOPERATIVE DIAGNOSIS: Advanced degenerative joint disease of the left knee secondary to osteoarthritis    POSTOPERATIVE DIAGNOSIS: same     PROCEDURE: Left total Knee Arthroplasty CPT 28774, Use of computer-assisted surgical navigation system CPT 57492, 0055T CT utilization for preoperative planning of robotic total knee arthroplasty    SURGICAL DETAILS:     APPROACH: Medial parapatellar     ANESTHESIA: Spinal plus local periarticular block    PREOPERATIVE ANTIBIOTICS: Ancef 2 g IV    TRANEXAMIC ACID: IV    TOURNIQUET TIME: 83 min @300 mmHg     ESTIMATED BLOOD LOSS: 50 cc     SPECIMENS: None    IMPLANTS:   /Brand: [employer] triathlon  Tibial component size: 6 pressfit tritanium baseplate   Femoral component size: 5 cemented cruciate retaining   Tibial polyethylene insert: 10 mm cruciate stabilizing   Patellar component: 35 mm asymmetric tritanium  Cement: 1 bag Simplex P medium viscosity without antibiotics    DRAINS: None    LOCAL INJECTION: 1 cc Toradol 30mg/ml, 4 cc duramorph 2mg/ml, 20 cc 0.5% ropivicaine, 20 cc 0.5% lidocaine with 1:200,000 epinephrine, 15 cc preservative free normal saline     MODIFIER(S): None    COMPLICATIONS: None apparent    INDICATIONS FOR PROCEDURE: The patient has a long history of progressive knee pain, arthritis, and degeneration resulting in deformity in the left knee from predominantly medial wear and bone loss. Non-operative treatment and conservative therapeutic measures have been attempted,  but have not improved or controlled the symptoms and pain that occurs during normal daily activities. Knee motion has also become limited and is restricting the patient. Total knee arthroplasty was recommended. The risks, benefits, alternatives, and potential complications of the arthroplasty surgery were discussed with the patient in detail to include but not be limited to infection, bleeding, anesthesia risks, damage to neurovascular structures, osteolysis, aseptic loosening, instability, anterior knee pain, continued pain, iatrogenic fracture, dislocation, need for future surgery including the potential for amputation, blood clots, myocardial infarction, stroke, and death. Specific details of the surgical procedure, hospitalization, recovery, rehabilitation, and long-term precautions were also presented. Pre-operative teaching was provided. Implant/prosthesis selection was outlined, and the many options available were explained; the final choice will be made at the time of the procedure to match the anatomy and condition of the bone, ligaments, tendons, and muscles. The patient completed preoperative medical optimization and risk assessment, joint arthroplasty education, and MRSA decolonization using a universal decolonization protocol. Perioperative blood management and the potential for blood transfusion were discussed with risks and options clearly outlined.     INTRAOPERATIVE FINDINGS: Advanced tricompartmental osteoarthritis with genu varum alignment    PROCEDURE: The patient was identified in the preoperative holding area. The operative site was confirmed and marked. A sequential compression device was placed on the nonoperative leg. The risks, benefits, and alternatives to surgery were again confirmed with the patient and the patient wished to proceed. The patient was brought to the operating room and placed on the operating room table in the supine position. All bony prominences were padded. A huddle was  performed with the patient and all vital surgical team members to confirm the correct operative site, procedure, anesthesia type, and operative plan with the patient. After anesthesia was performed, a tourniquet was applied to the upper thigh of the operative leg. A full knee exam was performed once anesthesia was in full effect. Intravenous antibiotic prophylaxis was given and confirmed with the anesthesia team.     The operative leg was prepped and draped in the usual sterile fashion. A surgical time out was performed immediately preceding the incision with all personnel in the operating room to confirm patient identity, the correct operative site and extremity, correct radiographic studies, availability of appropriate surgical equipment and agreement on the planned procedure. The operative knee was elevated and exsanguinated using an esmarch and the tourniquet was inflated. The knee was exposed using a limited anterior-midline skin incision. Dissection was carried down through skin and subcutaneous tissue to the extensor mechanism with a scalpel. A medial parapatellar arthrotomy was made to enter the knee space sharply. A large amount of normal appearing joint fluid was encountered and suctioned. The synovium was thickened, hypertrophic, and inflamed. A partial synovectomy was performed for exposure, and the medial and lateral gutters were cleared of scar and synovial reflections. The superficial medial collateral ligament was carefully elevated off osteophytes .  The patellar synovial reflections were released and the patella exposed to reveal complete wear through the articular surface. The trochlea demonstrated similar severe wear. The patella was then subluxed laterally. The knee was then flexed up to 90 degrees.     Assessment of the knee joint revealed severe end-stage articular damage with no remaining medial weight bearing cartilage. The medial compartment was severely eburnated with bone loss on the  medial tibia and medial femoral condyle, resulting in the varus deformity.     With the exposure complete, femoral pins for navigation were drilled and placed intra-incisional in the region of the medial distal femoral metaphysis just proximal to the medial epicondyle.  Tibial pins for navigation were then placed extra-incisional 4 fingerbreadths below the tibial tubercle taking care to engage the far cortex of the tibia but not perforate the cortex.  The navigation arrays were then placed onto the pins, adjusted, and tightened definitively.  The femoral and tibial checkpoints were then placed.    The knee was then taken through range of motion to find the hip center.  The medial and lateral malleolus were then marked to find the ankle center. Next, using a rongeur and osteotome, marginal osteophytes were then removed from the tibia and the femur and the ACL resected.  Baseline measurements of the knee were then taken and the knee was balanced with adjustments made to varus and valgus on the femur and tibia as well as femoral rotation.  Adjustments were as follows:    Femur: 0.5 degrees varus, 3.5 degrees external rotation relative to the transepicondylar axis.  0.5 mm anterior translation  Tibia: 2.5 degrees varus, 3 degrees posterior slope, 2.5 mm proximal translation    Satisfied with the balance of the knee, attention was turned to bony resection.  The tibia was cut first and the tibial bone removed.  A tensioner was then placed on the resected surface to tension the knee in both flexion and extension.  The adjustments made precut were found to remain grossly unchanged with overall good alignment and balance of the knee in flexion and extension.  Next, femoral cuts were made starting with the posterior femoral cuts followed by the anterior femoral cut, followed by the anterior chamfer.  The sawblade was then changed and the distal femoral cut and posterior chamfers were completed.    A lamina  was placed  with the knee in 90 degrees of flexion and a large curved osteotome, rongeur, and curettes were utilized to clear posterior osteophytes. The medial/lateral meniscal remnants were then excised along with any loose bodies.     A femoral trial implant was placed; excellent fit was confirmed. The medial-lateral and anterior-posterior dimensions were checked; anatomic fit and coverage were achieved.The proximal tibia baseplate trial was placed with its mid-point at the junction of medial one-third and lateral two-thirds of the tibial tubercle and pinned to this fixed position. A trial reduction was then performed. Trial reduction demonstrated the knee achieved full extension with excellent stability and range of motion, and no tendency toward instability with varus-valgus stress at full extension, mid-flexion, or 90 degrees of flexion. The PCL was also found to be appropriately tensioned with normal posterior tibial excursion.  The tibia and femoral pins and arrays were then removed along with the femoral and tibial checkpoints.    Next, attention was turned to the patella. It was measured and the posterior 9-10 mm was resected leaving a healthy remnant with greater than 11mm thickness. The patella was sized with the asymmetric guide, and drill holes were made. A trial button was placed and tracking of the patella and the entire knee trial was tested. The patella tracking was excellent throughout range of motion with no instability. Punches and drills were then placed through the trials to accommodate the final implants. All trials were removed.     The wound was copiously lavaged with a pulse irrigation/suction system. The posterior recess of the knee and areas of known bleeding were treated with the electrocautery to reduce post-operative bleeding. A pain cocktail was injected into the ryan-articular tissues. The cut bone surfaces were then irrigated again, suctioned, and dried.  The cement for the femoral component  was then mixed on the back table using third-generation cement technique.  The final implants were impacted into place, tibia followed by tibial polyethylene followed by cementing the femur followed by patella.  The knee was then held in extension after excess cement was curetted from the implant bone interface.  Once the cement had cured, the tourniquet was released and no excessive bleeding was encountered. Synovial bleeding was further treated with the electrocautery until adequate hemostasis was obtained.     The wound was again irrigated with dilute betadine solution followed by saline. The extensor mechanism and capsule was then anatomically closed with interrupted #1 Vicryl suture and a running #2 Stratafix stitch. Knee stability and range of motion with the capsule closed was excellent, and range of motion was 0 to 135 degrees without excessive stress on the repair. Instrument and sponge count was completed and confirmed correct. Deep and superficial subcutaneous tissue was closed with interrupted 2-0 Vicryl suture. A running 3-0 Monocryl subcuticular stitch was used to re-approximate the skin edges followed by skin glue adhesive to seal the wound. A silver impregnated dressing was then placed over the knee incision and a Covaderm over the tibial pin incisions, followed by a sequential compression device to the operative limb. The patient was sufficiently recovered from anesthesia, transferred to a hospital bed and taken to the PACU in stable condition.     One gram (1000 mg) of intravenous tranexamic acid was administered prior to incision. A second one gram (1000 mg) intravenous dose was given prior to wound closure.    No apparent complications occurred during the procedure. Instrument, sponge and needle counts were correct x 2.     The patient underwent risk stratification preoperatively and Lovenox bridge to home Xarelto was chosen for DVT prophylaxis. Delay in starting chemical prophylaxis for 23 hours  from surgical incision was over concerns for hematoma formation and wound related issues.     POST OPERATIVE PLAN:   Weight bearing as tolerated with knee range of motion as tolerated   Pain control with PO/IV meds   Adductor canal catheter placement by Anesthesia Pain Management Team in PACU.   23 hours perioperative antibiotic prophylaxis   PT/OT for mobilization and medical equipment needs   Keep silver dressing in place for 7 days post op. Change dressing only if saturated.   SCDs to bilateral lower extremities   Social work for discharge planning needs   Follow up in 3 weeks for post operative wound check with 3 views of operative knee.      (1) Other Medications/None

## 2024-09-09 NOTE — PLAN OF CARE
Goal Outcome Evaluation:  Plan of Care Reviewed With: patient, spouse        Progress: no change  Outcome Evaluation: Pt amb 320' with FWW and CGAx2. Pt navigated steps without difficulty. No knee buckling or LOB noted. Good balance and safety awareness observed. Encouraged use of FWW with all mobility until cleared by PT. HEP and precautions reviewed with pt. Frequent ambulation encouraged. Car transfer reviewed with pt. Recommend d/c home with assist and OPPT when medically appropriate. Pt cleared to d/c today from PT standpoint.      Anticipated Discharge Disposition (PT): home with assist, home with outpatient therapy services

## 2024-09-09 NOTE — H&P
Pre-Op H&P  Ángel Parikh  [id number]  1945      Chief complaint: Left knee pain      Subjective:  Patient is a 78 y.o.male presents for scheduled surgery by Dr. Hull. He anticipates a TOTAL KNEE ARTHROPLASTY WITH PREETI ROBOT - left today. His knee has been painful for several years. He denies use of assistive device for ambulation or recent falls. The pain is worse with walking and climbing stairs; pain medication and/or NSAID improve the pain.       Review of Systems:  Constitutional-- No fever, chills or sweats. No fatigue.  CV-- No chest pain, palpitation or syncope. +HTN, HLD, CHF  Resp-- No cough, hemoptysis. +SOB, MAE on cpap  Skin--No rashes or lesions      Allergies:   Allergies   Allergen Reactions    Levofloxacin Itching and Rash    Meloxicam Swelling    Percocet [Oxycodone-Acetaminophen] Rash     Chest and back red         Home Meds:  Medications Prior to Admission   Medication Sig Dispense Refill Last Dose    aspirin 81 MG EC tablet Take 1 tablet by mouth Daily. Stopped for surgery       atorvastatin (LIPITOR) 20 MG tablet Take 1 tablet by mouth Every Night. 90 tablet 3     Chlorhexidine Gluconate 4 % solution Shower daily with hibiclens solution as directed 5 days prior to surgery. 236 mL 0     doxazosin (CARDURA) 4 MG tablet Take 1 tablet by mouth Every Night. 90 tablet 2     Entresto 24-26 MG tablet Take 1 tablet by mouth 2 (Two) Times a Day. 180 tablet 2     fluticasone (FLONASE) 50 MCG/ACT nasal spray 2 sprays into the nostril(s) as directed by provider Daily.       metoprolol tartrate (LOPRESSOR) 25 MG tablet Take 1 tablet by mouth 2 (Two) Times a Day. 180 tablet 2     multivitamin with minerals (MULTIVITAMIN ADULT PO) Take 1 tablet by mouth Daily.       pantoprazole (PROTONIX) 40 MG EC tablet Take 1 tablet by mouth Daily. 90 tablet 2     rivaroxaban (XARELTO) 20 MG tablet Take 1 tablet by mouth Daily. 90 tablet 3     Vibegron (Gemtesa) 75 MG tablet Take 1 tablet by mouth Daily.             PMH:   Past Medical History:   Diagnosis Date    Anemia 11/22/2005    Arthritis     multi joint jose left knee     Cancer     prostate per elevated PSA and biopsies     CHF (congestive heart failure)     Colonic polyp     Elevated PSA     Encounter for screening colonoscopy     Essential hypertension     Gastroesophageal reflux disease without esophagitis     H/O valvular heart disease     3 leaky valves- monitored by [facility] and Dr Scott (sees every 6 months)    Hearing loss     hearing aids in both ears    Heart murmur     Hemorrhoid     bleeds on occasion     Hiatal hernia     High risk medication use     History of cellulitis     History of tobacco use     QUIT AT AGE 51    Hypercholesterolemia     Hyperglycemia     Hyperlipidemia     Knee swelling     Morbid obesity     Overactive bladder     Psoriasis     spots on legs     Restless leg syndrome     Sleep apnea with use of continuous positive airway pressure (CPAP)     10 auto setting    Visual impairment     Vitamin B12 deficiency     Vitamin D deficiency     Wears glasses     Wears partial dentures      PSH:    Past Surgical History:   Procedure Laterality Date    CARDIAC CATHETERIZATION N/A 01/08/2021    Procedure: Left Heart Cath 64561;  Surgeon: Reggie Scott MD;  Location:  [facility];  Service: Cardiovascular;  Laterality: N/A;    CARDIAC ELECTROPHYSIOLOGY PROCEDURE N/A 1/31/2024    Procedure: Ablation atrial fibrillation (PFA). DNS Xarelto. Hold Flecainide 3 days prior.;  Surgeon: Luis Atwood DO;  Location:  [facility];  Service: Cardiovascular;  Laterality: N/A;    COLONOSCOPY  11/10/2015    MOD SIGMOID DIVERTICULOSIS, NO POLYPS. PLAN: FIBER, REPEAT IN 10 YEARS.    COLONOSCOPY  11/22/2005    DIVERTICULOSIS, INT HEMERRHOIDS. NO POLYPS. EGD- REFLUX, NO BLEEDING. FOLLOW UP CAPSULE ENDOSCOPY AT  2012-NORMAL    CYSTOSCOPY AND NEEDLE BIOPSY PROSTATE      twice; last time 2017    PROSTATECTOMY N/A  10/03/2017    Procedure: PROSTATECTOMY LAPAROSCOPIC WITH DAVINCI ROBOT;  Surgeon: Jameel Ferguson Jr., MD;  Location: [facility] OR;  Service:        Immunization History:  Influenza: UTD  Pneumococcal: UTD  Tetanus: UTD    Social History:   Tobacco:   Social History     Tobacco Use   Smoking Status Former    Current packs/day: 0.00    Average packs/day: 1 pack/day for 15.0 years (15.0 ttl pk-yrs)    Types: Cigarettes, Pipe, Cigars    Start date: 1990    Quit date: 2005    Years since quittin.7    Passive exposure: Past   Smokeless Tobacco Never   Tobacco Comments    smoked 3 cigars a day and pipe off and on       Alcohol:     Social History     Substance and Sexual Activity   Alcohol Use Not Currently         Physical Exam: VS: /84  HR 50  RR 16  T 97.4  Sat 98%RA      General Appearance:    Alert, cooperative, no distress, appears stated age   Head:    Normocephalic, without obvious abnormality, atraumatic   Lungs:     Clear to auscultation bilaterally, respirations unlabored    Heart:   Regular rate and rhythm, S1 and S2 normal    Abdomen:    Soft without tenderness   Extremities:   Extremities normal, atraumatic, no cyanosis or edema   Skin:   Skin color, texture, turgor normal, no rashes or lesions   Neurologic:   Grossly intact     Results Review:     LABS:  Lab Results   Component Value Date    WBC 5.73 2024    HGB 13.5 2024    HCT 40.6 2024    MCV 95.8 2024     2024    NEUTROABS 3.41 2024    GLUCOSE 104 (H) 2024    BUN 16 2024    CREATININE 0.92 2024    EGFRIFNONA 75 11/10/2021     2024    K 4.3 2024     2024    CO2 27.0 2024    CALCIUM 8.7 2024    ALBUMIN 4.0 2024    AST 19 2024    ALT 13 2024    BILITOT 0.3 2024       RADIOLOGY:  Imaging Results (Last 72 Hours)       ** No results found for the last 72 hours. **            I reviewed the patient's new clinical  results.    Cancer Staging (if applicable)  Cancer Patient: __ yes __no __unknown; If yes, clinical stage T:__ N:__M:__, stage group or __N/A      Impression: Left knee pain      Plan: TOTAL KNEE ARTHROPLASTY WITH PREETI ROBOT- left      Jhoana Olivares, IRMA   9/9/2024   10:29 EDT

## 2024-09-09 NOTE — ANESTHESIA PROCEDURE NOTES
Left AC Cath      Patient reassessed immediately prior to procedure    Patient location during procedure: post-op  Start time: 9/9/2024 3:16 PM  Stop time: 9/9/2024 3:25 PM  Reason for block: at surgeon's request and post-op pain management  Performed by  CRNA/CAA: Chau Rodriguez, CRNA  Assisted by: Sandra Acharya RN  Preanesthetic Checklist  Completed: patient identified, IV checked, site marked, risks and benefits discussed, surgical consent, monitors and equipment checked, pre-op evaluation and timeout performed  Prep:  Pt Position: supine  Sterile barriers:cap, gloves, mask, sterile barriers and washed/disinfected hands  Prep: ChloraPrep  Patient monitoring: blood pressure monitoring, continuous pulse oximetry and EKG  Procedure    Sedation: no  Performed under: spinal  Guidance:ultrasound guided    ULTRASOUND INTERPRETATION.  Using ultrasound guidance a 20 G gauge needle was placed in close proximity to the nerve, at which point, under ultrasound guidance anesthetic was injected in the area of the nerve and spread of the anesthesia was seen on ultrasound in close proximity thereto.  There were no abnormalities seen on ultrasound; a digital image was taken; and the patient tolerated the procedure with no complications. Images:still images obtained, printed/placed on chart    Laterality:left  Block Type:adductor canal block  Injection Technique:catheter  Needle Type:Tuohy and echogenic  Needle Gauge:18 G  Resistance on Injection: none  Catheter Size:20 G (20g)  Cath Depth at skin: 13 cm    Medications Used: bupivacaine PF (MARCAINE) 0.25 % injection - Injection   20 mL - 9/9/2024 3:25:00 PM      Post Assessment  Injection Assessment: negative aspiration for heme, incremental injection and no paresthesia on injection  Patient Tolerance:comfortable throughout block  Complications:no  Additional Notes  CATHETER   A high-frequency linear transducer, with sterile cover, was placed on the anterior mid-thigh (between  "the anterior superior iliac spine and patella). The transducer was then moved medially to identify the Sartorius muscle (Analy), Vastus Medialis muscle (VMM), Superficial Femoral Artery (SFA) and Vein. The transducer was then moved cephalad or caudad to position the SFA in the middle of the Analy. The insertion site was prepped and draped in sterile fashion. Skin and cutaneous tissue was infiltrated with 2-5 ml of 1% Lidocaine. Using ultrasound-guidance, an 18-gauge Contiplex Ultra 360 Touhy needle was advanced in plane from lateral to medial. Preservative-free normal saline was utilized for hydro-dissection of tissue, advancement of Touhy, and to confirm needle placement below the fascial plane of the Analy where the Nerve to the VMM is located. Local anesthetic (LA) 5 ml deposited here. The Touhy needle continues its path lateral to the SFA at the level of the Saphenous Nerve. The remainder of the LA was deposited at the 10-11 o'clock position of the SFA. This injection created a space between the Analy and the SFA. Aspiration every 5 ml to prevent intravascular injection. Injection was completed with negative aspiration of blood and negative intravascular injection. Injection pressures were normal with minimal resistance. A 20-gauge Contiplex Echo catheter was placed through the needle and advance out the tip of the Touhy 3-5 cm anterior to the SFA. The Touhy needle was then removed, and final catheter position verified at the 12 o'clock position to the SFA. The catheter was secured in the usual fashion with skin glue, benzoin, steri-strips, CHG tegaderm and label noting \"Nerve Block Catheter\". Jerk tape applied at yellow connector and catheter connection.   Performed by: Raghu Devlin, CRNA            "

## 2024-09-09 NOTE — ANESTHESIA PREPROCEDURE EVALUATION
Anesthesia Evaluation     Patient summary reviewed and Nursing notes reviewed   NPO Solid Status: > 8 hours  NPO Liquid Status: > 8 hours           Airway   Mallampati: III  TM distance: >3 FB  Neck ROM: limited  Possible difficult intubation  Dental - normal exam     Pulmonary     breath sounds clear to auscultation  Cardiovascular   Exercise tolerance: good (4-7 METS)    Rhythm: regular  Rate: normal        Neuro/Psych  GI/Hepatic/Renal/Endo      Musculoskeletal     Abdominal    Substance History      OB/GYN          Other                          Anesthesia Plan    ASA 3     spinal     (Adductor canal block in pacu for post op p[ain control)    Anesthetic plan, risks, benefits, and alternatives have been provided, discussed and informed consent has been obtained with: patient.        CODE STATUS:

## 2024-09-09 NOTE — PLAN OF CARE
Goal Outcome Evaluation:  Plan of Care Reviewed With: patient           Outcome Evaluation: Pt received from PACU. He has voided, tolerated his evening meal without nausea, Walked with PT (See PT notes), His pain is controlled with his nerve cath, He has received his IV antibiotic and he has voided without difficulty. Pt given discharge instructions and follow up appointment information. Verbalizes understanding. Will discharge home.

## 2024-09-09 NOTE — BRIEF OP NOTE
TOTAL KNEE ARTHROPLASTY WITH PREETI ROBOT  Progress Note    Ángel Parikh  9/9/2024    Pre-op Diagnosis:   Primary osteoarthritis of left knee [M17.12]       Post-Op Diagnosis Codes:     * Primary osteoarthritis of left knee [M17.12]    Procedure/CPT® Codes:  VA ARTHRP KNE CONDYLE&PLATU MEDIAL&LAT COMPARTMENTS [05281]  VA CPTR-ASST SURGICAL NAVIGATION IMAGE-LESS [27450]      Procedure(s):  TOTAL KNEE ARTHROPLASTY WITH PREETI ROBOT    Surgical Approach: Knee Medial Parapatellar            Surgeon(s):  Perez Hull MD    Anesthesia: Choice    Staff:   Circulator: Nataly Dan RN; Dylan Tucker RN  Scrub Person: Sonia Altamirano; Lalita Mederos  Nursing Assistant: Tayla Brewster PCT; Julia Trevino  Assistant: Dylan Maznano PA-C  Assistant: Dylan Manzano PA-C      Estimated Blood Loss:  50 mL    Urine Voided: * No values recorded between 9/9/2024 12:05 PM and 9/9/2024  1:58 PM *    Specimens:                None          Drains: * No LDAs found *    Findings: Advanced tricompartmental osteoarthritis with genu varum alignment        Complications: None apparent    Assistant: Dylan Manzano PA-C  was responsible for performing the following activities: Retraction, Suction, Irrigation, Suturing, Closing, and Placing Dressing and their skilled assistance was necessary for the success of this case.    Perez Hull MD     Date: 9/9/2024  Time: 14:13 EDT

## 2024-09-09 NOTE — THERAPY EVALUATION
Patient Name: Ángel Parikh  : 1945    MRN: [id number]                              Today's Date: 2024       Admit Date: 2024    Visit Dx:     ICD-10-CM ICD-9-CM   1. S/P TKR (total knee replacement), left  Z96.652 V43.65   2. Primary osteoarthritis of left knee  M17.12 715.16     Patient Active Problem List   Diagnosis    Status post placement of implantable loop recorder    Essential hypertension    HLD (hyperlipidemia)    MAE on CPAP    Iron deficiency anemia    Shortness of breath    History of malignant neoplasm of prostate    Gastroesophageal reflux disease without esophagitis    Atrial fibrillation, persistent    Chronic anticoagulation    General medical exam    Class 1 obesity due to excess calories with serious comorbidity and body mass index (BMI) of 34.0 to 34.9 in adult    Primary osteoarthritis of both knees    Hyperglycemia    Vitamin B12 deficiency    OAB (overactive bladder)    Hyperthyroidism    Chronic heart failure with preserved ejection fraction (HFpEF)    Arthritis of knee    Abnormal thyroid function test    S/P TKR (total knee replacement), left     Past Medical History:   Diagnosis Date    Anemia 2005    Arthritis     multi joint jose left knee     Cancer     prostate per elevated PSA and biopsies     CHF (congestive heart failure)     Colonic polyp     Elevated PSA     Encounter for screening colonoscopy     Essential hypertension     Gastroesophageal reflux disease without esophagitis     H/O valvular heart disease     3 leaky valves- monitored by FMD and Dr Scott (sees every 6 months)    Hearing loss     hearing aids in both ears    Heart murmur     Hemorrhoid     bleeds on occasion     Hiatal hernia     High risk medication use     History of cellulitis     History of tobacco use     QUIT AT AGE 51    Hypercholesterolemia     Hyperglycemia     Hyperlipidemia     Knee swelling     Morbid obesity     Overactive bladder     Psoriasis     spots on legs     Restless  leg syndrome     Sleep apnea with use of continuous positive airway pressure (CPAP)     10 auto setting    Visual impairment     Vitamin B12 deficiency     Vitamin D deficiency     Wears glasses     Wears partial dentures      Past Surgical History:   Procedure Laterality Date    CARDIAC CATHETERIZATION N/A 01/08/2021    Procedure: Left Heart Cath 29509;  Surgeon: Reggie Scott MD;  Location:  [facility];  Service: Cardiovascular;  Laterality: N/A;    CARDIAC ELECTROPHYSIOLOGY PROCEDURE N/A 1/31/2024    Procedure: Ablation atrial fibrillation (PFA). DNS Xarelto. Hold Flecainide 3 days prior.;  Surgeon: Luis Atwood DO;  Location:  [facility];  Service: Cardiovascular;  Laterality: N/A;    COLONOSCOPY  11/10/2015    MOD SIGMOID DIVERTICULOSIS, NO POLYPS. PLAN: FIBER, REPEAT IN 10 YEARS.    COLONOSCOPY  11/22/2005    DIVERTICULOSIS, INT HEMERRHOIDS. NO POLYPS. EGD- REFLUX, NO BLEEDING. FOLLOW UP CAPSULE ENDOSCOPY AT  2012-NORMAL    CYSTOSCOPY AND NEEDLE BIOPSY PROSTATE      twice; last time 2017    PROSTATECTOMY N/A 10/03/2017    Procedure: PROSTATECTOMY LAPAROSCOPIC WITH DAVINCI ROBOT;  Surgeon: Jameel Ferguson Jr., MD;  Location:  [facility];  Service:       General Information       Row Name 09/09/24 1742          Physical Therapy Time and Intention    Document Type evaluation  -     Mode of Treatment physical therapy  -       Row Name 09/09/24 1742          General Information    Patient Profile Reviewed yes  -HW     Prior Level of Function min assist:;all household mobility;community mobility;gait;transfer  denied use of AD or recent falls; retired  -     Existing Precautions/Restrictions fall;other (see comments)  adductor canal nerve cath  -     Barriers to Rehab none identified  -       Row Name 09/09/24 1742          Living Environment    People in Home spouse  -       Row Name 09/09/24 1742          Home Main Entrance    Number of Stairs, Main  Entrance five  -HW     Stair Railings, Main Entrance railings on both sides of stairs  -       Row Name 09/09/24 1742          Stairs Within Home, Primary    Number of Stairs, Within Home, Primary none  -       Row Name 09/09/24 1742          Cognition    Orientation Status (Cognition) oriented x 3  -       Row Name 09/09/24 1742          Safety Issues, Functional Mobility    Safety Issues Affecting Function (Mobility) awareness of need for assistance;insight into deficits/self-awareness  -     Impairments Affecting Function (Mobility) balance;endurance/activity tolerance;pain;range of motion (ROM);strength  -               User Key  (r) = Recorded By, (t) = Taken By, (c) = Cosigned By      Initials Name Provider Type     Minerva Martinez PT Physical Therapist                   Mobility       Row Name 09/09/24 1728          Bed Mobility    Bed Mobility supine-sit  -HW     Supine-Sit [place] (Bed Mobility) standby assist  -       Row Name 09/09/24 1728          Sit-Stand Transfer    Sit-Stand [place] (Transfers) contact guard;nonverbal cues (demo/gesture);verbal cues;2 person assist  -     Assistive Device (Sit-Stand Transfers) walker, front-wheeled  -HW       Row Name 09/09/24 1728          Gait/Stairs (Locomotion)    [place] Level (Gait) contact guard;nonverbal cues (demo/gesture);verbal cues;2 person assist  -     Assistive Device (Gait) walker, front-wheeled  -HW     Patient was able to Ambulate yes  -HW     Distance in Feet (Gait) 320  -HW     Deviations/Abnormal Patterns (Gait) bilateral deviations;gait speed decreased  -HW     Bilateral Gait Deviations forward flexed posture;heel strike decreased  -     [place] Level (Stairs) contact guard;nonverbal cues (demo/gesture);verbal cues;2 person assist  -     Handrail Location (Stairs) both sides  -     Number of Steps (Stairs) 5  -HW     Ascending Technique (Stairs) step-to-step  -     Descending Technique (Stairs)  step-to-step  -     Comment, (Gait/Stairs) Pt amb in ebe with step-through gait pattern. VC for using FWW at all times, increased stride length, increased knee extension, and upright posture. Good overall improvement noted. Pt navigated steps without difficulty. VC for sequencing. No knee buckling or LOB noted. Pain remained tolerable throughout mobility.  -[facility] Name 09/09/24 1728          Mobility    Extremity Weight-bearing Status left lower extremity  -     Left Lower Extremity (Weight-bearing Status) weight-bearing as tolerated (WBAT)  -               User Key  (r) = Recorded By, (t) = Taken By, (c) = Cosigned By      Initials Name Provider Type     Minerva Martinez, GALLO Physical Therapist                   Obj/Interventions       [facility] Name 09/09/24 1747          Range of Motion Comprehensive    General Range of Motion lower extremity range of motion deficits identified  -     Comment, General Range of Motion Surgical knee ROM: 14-96  -[facility] Name 09/09/24 1747          Strength Comprehensive (MMT)    General Manual Muscle Testing (MMT) Assessment lower extremity strength deficits identified  -     Comment, General Manual Muscle Testing (MMT) Assessment Pt able to perform B active ankle DF and SLR  -[facility] Name 09/09/24 1747          Motor Skills    Therapeutic Exercise knee;ankle  -[facility] Name 09/09/24 1747          Knee (Therapeutic Exercise)    Knee (Therapeutic Exercise) isometric exercises;strengthening exercise  -     Knee Isometrics (Therapeutic Exercise) left;quad sets;10 repetitions  -     Knee Strengthening (Therapeutic Exercise) left;heel slides;SLR (straight leg raise);SAQ (short arc quad);LAQ (long arc quad);10 repetitions  -[facility] Name 09/09/24 1747          Ankle (Therapeutic Exercise)    Ankle (Therapeutic Exercise) AROM (active range of motion)  -     Ankle AROM (Therapeutic Exercise) bilateral;dorsiflexion;plantarflexion;10 repetitions  -[facility]  Name 09/09/24 1747          Balance    Balance Assessment sitting static balance;sitting dynamic balance;sit to stand dynamic balance;standing static balance;standing dynamic balance  -     Static Sitting Balance standby assist  -     Dynamic Sitting Balance standby assist  -HW     Position, Sitting Balance sitting edge of bed  -     Static Standing Balance contact guard  -     Dynamic Standing Balance contact guard  -     Position/Device Used, Standing Balance supported;walker, rolling  -     Balance Interventions sitting;standing;sit to stand;occupation based/functional task  -       Row Name 09/09/24 1747          Sensory Assessment (Somatosensory)    Sensory Assessment (Somatosensory) LE sensation intact  -               User Key  (r) = Recorded By, (t) = Taken By, (c) = Cosigned By      Initials Name Provider Type     Minerva Martinez, PT Physical Therapist                   Goals/Plan       [facility] Name 09/09/24 1750          Bed Mobility Goal 1 (PT)    Activity/Assistive Device (Bed Mobility Goal 1, PT) sit to supine/supine to sit  -HW     [place] Level/Cues Needed (Bed Mobility Goal 1, PT) modified independence  -HW     Time Frame (Bed Mobility Goal 1, PT) short term goal (STG);2 days  -[facility] Name 09/09/24 1750          Transfer Goal 1 (PT)    Activity/Assistive Device (Transfer Goal 1, PT) sit-to-stand/stand-to-sit  -HW     [place] Level/Cues Needed (Transfer Goal 1, PT) modified independence  -HW     Time Frame (Transfer Goal 1, PT) long term goal (LTG);3 days  -       Row Name 09/09/24 1750          Gait Training Goal 1 (PT)    Activity/Assistive Device (Gait Training Goal 1, PT) gait (walking locomotion)  -HW     [place] Level (Gait Training Goal 1, PT) modified independence  -HW     Distance (Gait Training Goal 1, PT) 500  -HW     Time Frame (Gait Training Goal 1, PT) long term goal (LTG);3 days  -[facility] Name 09/09/24 1750          ROM Goal 1 (PT)    ROM Goal 1  (PT) Surgical Knee ROM: 0-100  -HW     Time Frame (ROM Goal 1, PT) long-term goal (LTG);3 days  -       Row Name 09/09/24 1750          Stairs Goal 1 (PT)    Activity/Assistive Device (Stairs Goal 1, PT) ascending stairs;descending stairs  -     [place] Level/Cues Needed (Stairs Goal 1, PT) modified independence  -     Number of Stairs (Stairs Goal 1, PT) 5  -HW     Time Frame (Stairs Goal 1, PT) long term goal (LTG);3 days  -       Row Name 09/09/24 1750          Therapy Assessment/Plan (PT)    Planned Therapy Interventions (PT) balance training;bed mobility training;gait training;home exercise program;ROM (range of motion);patient/family education;stair training;strengthening;stretching;transfer training  -               User Key  (r) = Recorded By, (t) = Taken By, (c) = Cosigned By      Initials Name Provider Type     Minerva Martinez, PT Physical Therapist                   Clinical Impression       Row Name 09/09/24 1748          Pain    Pretreatment Pain Rating 2/10  -     Posttreatment Pain Rating 2/10  -     Pain Location - Side/Orientation Left  -     Pain Location anterior  -     Pain Location - knee  -     Pain Intervention(s) Ambulation/increased activity;Repositioned;Elevated;Cold applied  -       Row Name 09/09/24 1748          Plan of Care Review    Plan of Care Reviewed With patient;spouse  -     Progress no change  -     Outcome Evaluation Pt amb 320' with FWW and CGAx2. Pt navigated steps without difficulty. No knee buckling or LOB noted. Good balance and safety awareness observed. Encouraged use of FWW with all mobility until cleared by PT. HEP and precautions reviewed with pt. Frequent ambulation encouraged. Car transfer reviewed with pt. Recommend d/c home with assist and OPPT when medically appropriate. Pt cleared to d/c today from PT standpoint.  -       Row Name 09/09/24 2550          Therapy Assessment/Plan (PT)    Rehab Potential (PT) good, to achieve stated  therapy goals  -     Criteria for Skilled Interventions Met (PT) yes;meets criteria;skilled treatment is necessary  -     Therapy Frequency (PT) 2 times/day  -       Row Name 09/09/24 1748          Positioning and Restraints    Pre-Treatment Position in bed  -     Post Treatment Position chair  -HW     In Chair notified nsg;reclined;sitting;call light within reach;encouraged to call for assist;exit alarm on;with family/caregiver;legs elevated;compression device  ice applied; pillow placed distal to knee to promote increased knee extension  -               User Key  (r) = Recorded By, (t) = Taken By, (c) = Cosigned By      Initials Name Provider Type     Minerva Martinez, GALLO Physical Therapist                   Outcome Measures       Row Name 09/09/24 1750          How much help from another person do you currently need...    Turning from your back to your side while in flat bed without using bedrails? 3  -HW     Moving from lying on back to sitting on the side of a flat bed without bedrails? 3  -HW     Moving to and from a bed to a chair (including a wheelchair)? 3  -HW     Standing up from a chair using your arms (e.g., wheelchair, bedside chair)? 3  -HW     Climbing 3-5 steps with a railing? 3  -HW     To walk in hospital room? 3  -HW     AM-PAC 6 Clicks Score (PT) 18  -     Highest Level of Mobility Goal 6 --> Walk 10 steps or more  -       Row Name 09/09/24 1750          PADD    Diagnosis 1  -     Gender 2  -     Age Group 1  -     Gait Distance 1  -     Assist Level 1  -     Home Support 3  -     PADD Score 8  -     Patient Preference home with outpatient rehab  -     Prediction by PADD Score directly home (with home health or out-patient rehab)  -       Row Name 09/09/24 1750          Functional Assessment    Outcome Measure Options AM-PAC 6 Clicks Basic Mobility (PT);PADD  -               User Key  (r) = Recorded By, (t) = Taken By, (c) = Cosigned By      Initials Name Provider  Type     Minerva Martinez, GALLO Physical Therapist                                 Physical Therapy Education       Title: PT OT SLP Therapies (Done)       Topic: Physical Therapy (Done)       Point: Mobility training (Done)       Learning Progress Summary             Patient Acceptance, E,D, VU,DU by  at 9/9/2024 1751                         Point: Home exercise program (Done)       Learning Progress Summary             Patient Acceptance, E,D, VU,DU by  at 9/9/2024 1751                         Point: Body mechanics (Done)       Learning Progress Summary             Patient Acceptance, E,D, VU,DU by  at 9/9/2024 1751                         Point: Precautions (Done)       Learning Progress Summary             Patient Acceptance, E,D, VU,DU by  at 9/9/2024 1751                                         User Key       Initials Effective Dates Name Provider Type Discipline     12/15/23 -  Minerva Martinez PT Physical Therapist PT                  PT Recommendation and Plan  Planned Therapy Interventions (PT): balance training, bed mobility training, gait training, home exercise program, ROM (range of motion), patient/family education, stair training, strengthening, stretching, transfer training  Plan of Care Reviewed With: patient, spouse  Progress: no change  Outcome Evaluation: Pt amb 320' with FWW and CGAx2. Pt navigated steps without difficulty. No knee buckling or LOB noted. Good balance and safety awareness observed. Encouraged use of FWW with all mobility until cleared by PT. HEP and precautions reviewed with pt. Frequent ambulation encouraged. Car transfer reviewed with pt. Recommend d/c home with assist and OPPT when medically appropriate. Pt cleared to d/c today from PT standpoint.     Time Calculation:   PT Evaluation Complexity  History, PT Evaluation Complexity: 1-2 personal factors and/or comorbidities  Examination of Body Systems (PT Eval Complexity): total of 3 or more elements  Clinical Presentation  (PT Evaluation Complexity): stable  Clinical Decision Making (PT Evaluation Complexity): low complexity  Overall Complexity (PT Evaluation Complexity): low complexity     PT Charges       Row Name 09/09/24 1752             Time Calculation    Start Time 1658  -HW      PT Received On 09/09/24  -      PT Goal Re-Cert Due Date 09/19/24  -HW         Timed Charges    89354 - Gait Training Minutes  10  -HW         Untimed Charges    PT Eval/Re-eval Minutes 48  -HW         Total Minutes    Timed Charges Total Minutes 10  -HW      Untimed Charges Total Minutes 48  -HW       Total Minutes 58  -HW                User Key  (r) = Recorded By, (t) = Taken By, (c) = Cosigned By      Initials Name Provider Type     Minerva Martinez, PT Physical Therapist                  Therapy Charges for Today       Code Description Service Date Service Provider Modifiers Qty    95736222085 HC GAIT TRAINING EA 15 MIN 9/9/2024 Minerva Martinez, PT GP 1    25343004335 HC PT EVAL LOW COMPLEXITY 4 9/9/2024 Minerva Martinez, PT GP 1    26667962772 HC PT THER SUPP EA 15 MIN 9/9/2024 Minerva Martinez, PT GP 3            PT G-Codes  Outcome Measure Options: AM-PAC 6 Clicks Basic Mobility (PT), PADD  AM-PAC 6 Clicks Score (PT): 18  PT Discharge Summary  Anticipated Discharge Disposition (PT): home with assist, home with outpatient therapy services    Minerva Martinez PT  9/9/2024

## 2024-09-09 NOTE — DISCHARGE INSTRUCTIONS
"DISCHARGE INSTRUCTIONS   Dr. Hull     Total Knee Replacement/ Partial (Uni) Knee Replacement     Wound Care   1) Keep wound / incision area clean and dry.   2) Dressing to remain in place until post-operative day 7. Upon dressing removal, assess for wound drainage. If no drainage is present, keep wound / incision area open to air as much as possible. If drainage is present, place sterile dressing to cover wound and assess daily. If drainage continues to occur after post-operative day 14, call the office for an urgent appointment. (You should be seen in the clinic within 1-2 days of calling). DO NOT REMOVE SUTURES (IF PRESENT) UNDER ANY CIRCUMSTANCES PRIOR TO FOLLOW UP APPOINTMENT.  3) No baths or swimming until otherwise instructed. The wound must remain dry for 10 days after surgery. After 10 days, you may begin to shower only if no drainage is present. No submerging the wound under standing water until cleared by your physician (no baths, hot tubs, swimming pools, etc). Sponge baths are the best way to perform personal hygiene while at the same time protecting the wound from moisture.   4) Prior to showering, the wound must remain dry for 72 consecutive hours (no drainage whatsoever) prior to showering. If the wound drains or spots, the clock \"resets\" - make sure the wound has been drainage-free for 72 consecutive hours.   5) Once you are allowed to get the wound wet, please use gentle soap to wash the wound area. DO NOT aggressively scrub the wound with a washcloth or bath sponge. Please visually inspect your wound(s) at least once daily. If the wound(s) are in a difficult to see location, please use a mirror or have someone else assist with visual inspection.   6) No scrubbing the wound. You may \"pad dry\" the wound, but do not rub, as this may open up the wound and pre-dispose to wound infection.   7) Do not apply lotions or creams to incision site, unless instructed otherwise.   8) Observe for redness, " "swelling, or drainage. Please call the clinic immediately if you have fevers, chills with warmth/redness surrounding wound site or if you notice pus drainage from the wound site     Activity   No heavy lifting objects greater than 10 pounds.   No driving while on narcotic pain medication.   No submerging wound under standing water (pool, bath tub, etc.) until otherwise instructed.   You may be protected weightbearing as tolerated on your operative (left lower) extremity   Use crutches or a walker for ambulation.   Wean as appropriate per physical therapist's discretion.   Do not sleep with a pillow behind your knee. You may sleep with a pillow behind your Achilles or foot. This will prevent your knee from getting stiff in the flexed (\"bent\") position and will encourage full extension (leg straightening).   Be vigilant in terms of working on full knee extension and flexion. Your goal should be 0 to 90 degrees by 2 weeks post-op - MINIMUM!   Knee range of motion as tolerated.    Blood Clot Prophylaxis   (Aspirin vs. Lovenox vs. Eliquis administration is determined by your surgeon and tailored to your specific risk profile. You will be discharged with one of these medications.) You will need to complete a total 4 week course of enteric coated aspirin 325 mg (or 81mg) twice daily or Eliquis 2.5mg twice daily, in order to minimize your risk of blood clots following surgery. You will be supplied with a prescription to obtain this. Alternatively, you will need to compete a total 2 week course of Lovenox after surgery (followed by a 2 week course of aspirin twice daily), in order to minimize the risk of blood clots following surgery. Lovenox requires a single shot in the abdomen, to be taken once daily. You will be supplied with the prescription to obtain this. Prior to your discharge from the hospital, the nursing staff will instruct you on self-administration of the Lovenox, if you will be returning directly home from the " "hospital.     Discharge Pain Medications   You will be given a prescription for pain medication. You should start taking this the same day after your surgery. Wean off as tolerated. Do not wait to take the pain medication until the pain is severe, as it will be difficult to \"catch up\" once this occurs. The pain medication usually reaches its full effect ~1 hour after ingesting. If you have been sent home on Colace, this medication should be taken until you are off all narcotic (i.e. Oxycodone, etc) pain medications, in order to prevent constipation. If you have been sent home with a combination of oxycodone and Tylenol, please take Tylenol as scheduled.  You must be careful not to exceed 4,000mg (4 grams) of Tylenol. The oxycodone is to be taken as needed for \"breakthrough\" pain.  Some common side effects of the narcotic pain medications include nausea and itching. Benadryl is a great over the counter medication that helps calm your stomach, decreases your anxiety levels, and minimizes the itching. You can easily purchase this at your local pharmacy as an over-the-counter medication. Please abide by the instructions as printed on the bottle. If your nausea persists, make sure to take small amounts of crackers or other lighter foods.     Follow-Up   Follow-up with Dr. Hull's office in 3 weeks from the surgery date for a post-operative evaluation. Have the following xrays done upon arrival to the follow-up appointment: 3 views of operative knee. Please call Dr. Hull's office at [phone] for orthopaedic appointments or questions.[facility] COLD THERAPY - PATIENT INSTRUCTION SHEET    Cold Compression Therapy for your comfort and rehabilitation  Your caregivers want you to be productive in your rehab and comfortable during your stay. In keeping with those goals, you will be receiving an SMI Cold Therapy Wrap to help ease post-operative pain and swelling that might keep you from getting back on track! Your SMI Cold " Therapy Wrap is effective and simple-to-use, and you will be encouraged to apply it throughout your hospital stay and at home through the duration of your recovery.    When you are ready to go home  Be sure to take your SMI Cold Therapy Wrap and both sets of Gel Bags with you for continued comfort and use throughout your rehabilitation. If you don't already have them, ask your nurse or aide to retrieve your SMI Gel Bags from the patient freezer.    Home use precautions  Always follow your medical professional's application instructions upon discharge. Your SMI Cold Therapy Wrap and Gel Bags are designed to last for months following your surgery. Never heat the Gel Bags unless specified by your healthcare provider. Supervision is advised when using this product on children or geriatric patients. To avoid danger of suffocation, please keep the outer plastic packaging away from children & pets.    Cold Therapy Instructions  Place Gel Bags in a freezer set ¾ of the way to max temperature for at least (4) hours. For best results, lay the Gel Bags flat and [id number] in the freezer. Once frozen, slide Gel Bags into the gel pouch and secure your wrap to the affected area with the straps.  Gel wraps that have been stored in a freezer for an extended period of time may require a (10) minute period of softening up in a room temperature environment before application.  The gel pouch acts as a protective barrier. NEVER place frozen bags directly onto skin, as this may cause frostbite injury.  The SMI Cold Therapy Wrap is designed to be able to be worm while ambulating. The compression straps can be secured well enough so that the Wrap won't fall off while moving.  Wrap Application Videos can be viewed at [email or web address].  An additional protective barrier such as clothing, a washcloth, hand-towel or pillowcase may be used during prolonged treatment applications.  The Gel-Pouch and Wrap are both Latex-Free and the Gel  Bag ingredients are non toxic.    [facility] Wrap care instructions  The [facility] Cold Therapy Wrap may be hand washed and hung to dry when needed.    [facility] re-order information  Additional [facility] body specific wraps and/or Gel Bags can be re-ordered from [email or web address] or call [phone] ([phone])  In[employer] - Patient Information    What is a pain pump?  The InfuBLOCK pump delivers post-operative, non-narcotic, numbing medication to the nerve near the surgical site for pain relief.     Where can I find information about my pain pump?           For more information about your pain pump, scan the QR code.  For additional patient resources, visit [email or web address].                                                                                               While your physician is your primary source for information about your treatment there may be times during your treatment that you need assistance with your infusion pump.     If you need assistance take the following steps:    The [employer] [facility] Hotline is Here for You 24/7.  Please call 1-701.454.9698 for the following concerns or complications:    Answers to questions about your infusion pump                 Tubing disconnect  Assistance with pump alarms                                                      Dislodged catheter  Excessive leakage noted from pump                                         Inadequate pain control    2.   [facility]: [phone] is available 24/7 for any further needs or concerns about medication or pain control. Nerve Catheter Removal Instructions  When your device is empty:    Remove your catheter by pulling the dressing off slowly (like you would remove a regular bandage). The catheter should pull right out of the skin.  Check that the BLUE tip is intact.                                                                                     If the catheter is stuck,  reposition your   extremity and pull slowly until removed.  *If catheter is HURTING and WON'T come out, stop and call [phone] for further assistance.    Remove medication bag from the black carrying case.  Cut the tubing on right and left side of pump, and discard the medication bag and tubing into garbage.  Place the pump and black carrying case into the plastic bag and then place this into the return box.  Seal box with blue stickers and return to [place] postal service. THIS IS PRE-PAID POSTAGE.

## 2024-09-09 NOTE — ANESTHESIA POSTPROCEDURE EVALUATION
Patient: Ángel Parikh    Procedure Summary       Date: 09/09/24 [facility] / [facility]    Anesthesia Start: 1205 Anesthesia Stop: 1440    Procedure: TOTAL KNEE ARTHROPLASTY WITH PREETI ROBOT (Left: Knee) Diagnosis:       Primary osteoarthritis of left knee      (Primary osteoarthritis of left knee [M17.12])    Surgeons: Perez Hull MD Provider: Jameel Lucero MD    Anesthesia Type: spinal ASA Status: 3            Anesthesia Type: spinal    Vitals  Vitals Value Taken Time   /77 09/09/24 1440   Temp     Pulse 68 09/09/24 1440   Resp 16 09/09/24 1440   SpO2 99 % 09/09/24 1440           Post Anesthesia Care and Evaluation    Patient location during evaluation: PACU  Patient participation: waiting for patient participation  Level of consciousness: sleepy but conscious    Airway patency: patent  Anesthetic complications: No anesthetic complications  PONV Status: none  Cardiovascular status: blood pressure returned to baseline  Respiratory status: nasal cannula and spontaneous ventilation  Hydration status: acceptable  No anesthesia care post op

## 2024-09-10 ENCOUNTER — TELEPHONE (OUTPATIENT)
Dept: FAMILY MEDICINE CLINIC | Facility: CLINIC | Age: 79
End: 2024-09-10
Payer: MEDICARE

## 2024-09-11 ENCOUNTER — TELEPHONE (OUTPATIENT)
Dept: CARDIOLOGY | Facility: CLINIC | Age: 79
End: 2024-09-11
Payer: MEDICARE

## 2024-09-11 ENCOUNTER — TELEPHONE (OUTPATIENT)
Dept: ORTHOPEDIC SURGERY | Facility: CLINIC | Age: 79
End: 2024-09-11
Payer: MEDICARE

## 2024-09-11 NOTE — TELEPHONE ENCOUNTER
Patient had total knee done on Monday. Patient states the the pain pump is on 1 or 2 hours and it is not enough. Patient is also using an ice pack and nothing is relieving the pain. Patient states that it is a 14/10 pain scale. It is hard to move his leg and he can hardly lift it up. Patient states that his heart rate was low twice last night. Patient states that the pain got extreme when he took to wrap off yesterday.

## 2024-09-11 NOTE — TELEPHONE ENCOUNTER
I called patient and advised him to call the pain pump people about turning the medicine up. I also advised him to take his oxycodone 2 every 6 hours and take tylenol in between and to only do this for 24 hours and to back down to 1 every 4 hours. I explained that he needs to get his pain under control so he can do his PT. Patient understood and will call back with any further questions she may have.  Geri Quezada RT (R), ROT

## 2024-09-14 ENCOUNTER — HOSPITAL ENCOUNTER (EMERGENCY)
Facility: HOSPITAL | Age: 79
Discharge: HOME | End: 2024-09-14
Attending: STUDENT IN AN ORGANIZED HEALTH CARE EDUCATION/TRAINING PROGRAM
Payer: MEDICARE

## 2024-09-14 ENCOUNTER — APPOINTMENT (OUTPATIENT)
Dept: GENERAL RADIOLOGY | Facility: HOSPITAL | Age: 79
End: 2024-09-14
Payer: MEDICARE

## 2024-09-14 VITALS
HEIGHT: 66 IN | BODY MASS INDEX: 40.18 KG/M2 | RESPIRATION RATE: 20 BRPM | SYSTOLIC BLOOD PRESSURE: 153 MMHG | TEMPERATURE: 98.3 F | WEIGHT: 250 LBS | OXYGEN SATURATION: 95 % | DIASTOLIC BLOOD PRESSURE: 71 MMHG | HEART RATE: 65 BPM

## 2024-09-14 DIAGNOSIS — Z86.79 HISTORY OF ATRIAL FIBRILLATION: ICD-10-CM

## 2024-09-14 DIAGNOSIS — Z79.01 ANTICOAGULATED: ICD-10-CM

## 2024-09-14 DIAGNOSIS — M25.562 ACUTE PAIN OF LEFT KNEE: Primary | ICD-10-CM

## 2024-09-14 DIAGNOSIS — Z96.652 STATUS POST LEFT KNEE REPLACEMENT: ICD-10-CM

## 2024-09-14 LAB
ALBUMIN SERPL-MCNC: 3.8 G/DL (ref 3.5–5.2)
ALBUMIN/GLOB SERPL: 1.5 G/DL
ALP SERPL-CCNC: 73 U/L (ref 39–117)
ALT SERPL W P-5'-P-CCNC: 12 U/L (ref 1–41)
ANION GAP SERPL CALCULATED.3IONS-SCNC: 11 MMOL/L (ref 5–15)
AST SERPL-CCNC: 21 U/L (ref 1–40)
BASOPHILS # BLD AUTO: 0.03 10*3/MM3 (ref 0–0.2)
BASOPHILS NFR BLD AUTO: 0.5 % (ref 0–1.5)
BILIRUB SERPL-MCNC: 1.2 MG/DL (ref 0–1.2)
BUN SERPL-MCNC: 11 MG/DL (ref 8–23)
BUN/CREAT SERPL: 15.1 (ref 7–25)
CALCIUM SPEC-SCNC: 8.7 MG/DL (ref 8.6–10.5)
CHLORIDE SERPL-SCNC: 104 MMOL/L (ref 98–107)
CO2 SERPL-SCNC: 26 MMOL/L (ref 22–29)
CREAT SERPL-MCNC: 0.73 MG/DL (ref 0.76–1.27)
CRP SERPL-MCNC: 2.62 MG/DL (ref 0–0.5)
D-LACTATE SERPL-SCNC: 1.6 MMOL/L (ref 0.5–2)
DEPRECATED RDW RBC AUTO: 45.2 FL (ref 37–54)
EGFRCR SERPLBLD CKD-EPI 2021: 93.7 ML/MIN/1.73
EOSINOPHIL # BLD AUTO: 0.14 10*3/MM3 (ref 0–0.4)
EOSINOPHIL NFR BLD AUTO: 2.4 % (ref 0.3–6.2)
ERYTHROCYTE [DISTWIDTH] IN BLOOD BY AUTOMATED COUNT: 13.2 % (ref 12.3–15.4)
ERYTHROCYTE [SEDIMENTATION RATE] IN BLOOD: 50 MM/HR (ref 0–20)
GLOBULIN UR ELPH-MCNC: 2.6 GM/DL
GLUCOSE SERPL-MCNC: 129 MG/DL (ref 65–99)
HCT VFR BLD AUTO: 34.4 % (ref 37.5–51)
HGB BLD-MCNC: 11.7 G/DL (ref 13–17.7)
HOLD SPECIMEN: NORMAL
IMM GRANULOCYTES # BLD AUTO: 0.02 10*3/MM3 (ref 0–0.05)
IMM GRANULOCYTES NFR BLD AUTO: 0.3 % (ref 0–0.5)
LYMPHOCYTES # BLD AUTO: 0.84 10*3/MM3 (ref 0.7–3.1)
LYMPHOCYTES NFR BLD AUTO: 14.3 % (ref 19.6–45.3)
MCH RBC QN AUTO: 31.9 PG (ref 26.6–33)
MCHC RBC AUTO-ENTMCNC: 34 G/DL (ref 31.5–35.7)
MCV RBC AUTO: 93.7 FL (ref 79–97)
MONOCYTES # BLD AUTO: 0.47 10*3/MM3 (ref 0.1–0.9)
MONOCYTES NFR BLD AUTO: 8 % (ref 5–12)
NEUTROPHILS NFR BLD AUTO: 4.36 10*3/MM3 (ref 1.7–7)
NEUTROPHILS NFR BLD AUTO: 74.5 % (ref 42.7–76)
NRBC BLD AUTO-RTO: 0 /100 WBC (ref 0–0.2)
PLATELET # BLD AUTO: 187 10*3/MM3 (ref 140–450)
PMV BLD AUTO: 9.9 FL (ref 6–12)
POTASSIUM SERPL-SCNC: 3.2 MMOL/L (ref 3.5–5.2)
PROCALCITONIN SERPL-MCNC: 0.05 NG/ML (ref 0–0.25)
PROT SERPL-MCNC: 6.4 G/DL (ref 6–8.5)
RBC # BLD AUTO: 3.67 10*6/MM3 (ref 4.14–5.8)
SODIUM SERPL-SCNC: 141 MMOL/L (ref 136–145)
WBC NRBC COR # BLD AUTO: 5.86 10*3/MM3 (ref 3.4–10.8)
WHOLE BLOOD HOLD COAG: NORMAL
WHOLE BLOOD HOLD SPECIMEN: NORMAL

## 2024-09-14 PROCEDURE — 86140 C-REACTIVE PROTEIN: CPT | Performed by: PHYSICIAN ASSISTANT

## 2024-09-14 PROCEDURE — 87040 BLOOD CULTURE FOR BACTERIA: CPT | Performed by: PHYSICIAN ASSISTANT

## 2024-09-14 PROCEDURE — 80053 COMPREHEN METABOLIC PANEL: CPT | Performed by: PHYSICIAN ASSISTANT

## 2024-09-14 PROCEDURE — 85652 RBC SED RATE AUTOMATED: CPT | Performed by: PHYSICIAN ASSISTANT

## 2024-09-14 PROCEDURE — 83605 ASSAY OF LACTIC ACID: CPT | Performed by: PHYSICIAN ASSISTANT

## 2024-09-14 PROCEDURE — 99283 EMERGENCY DEPT VISIT LOW MDM: CPT

## 2024-09-14 PROCEDURE — 63710000001 ONDANSETRON ODT 4 MG TABLET DISPERSIBLE: Performed by: STUDENT IN AN ORGANIZED HEALTH CARE EDUCATION/TRAINING PROGRAM

## 2024-09-14 PROCEDURE — 84145 PROCALCITONIN (PCT): CPT | Performed by: PHYSICIAN ASSISTANT

## 2024-09-14 PROCEDURE — 36415 COLL VENOUS BLD VENIPUNCTURE: CPT

## 2024-09-14 PROCEDURE — 85025 COMPLETE CBC W/AUTO DIFF WBC: CPT | Performed by: PHYSICIAN ASSISTANT

## 2024-09-14 PROCEDURE — 73560 X-RAY EXAM OF KNEE 1 OR 2: CPT

## 2024-09-14 RX ORDER — POTASSIUM CHLORIDE 750 MG/1
40 CAPSULE, EXTENDED RELEASE ORAL ONCE
Status: COMPLETED | OUTPATIENT
Start: 2024-09-14 | End: 2024-09-14

## 2024-09-14 RX ORDER — HYDROCODONE BITARTRATE AND ACETAMINOPHEN 7.5; 325 MG/1; MG/1
1 TABLET ORAL ONCE
Status: COMPLETED | OUTPATIENT
Start: 2024-09-14 | End: 2024-09-14

## 2024-09-14 RX ORDER — ONDANSETRON 4 MG/1
4 TABLET, ORALLY DISINTEGRATING ORAL ONCE
Status: COMPLETED | OUTPATIENT
Start: 2024-09-14 | End: 2024-09-14

## 2024-09-14 RX ORDER — SODIUM CHLORIDE 0.9 % (FLUSH) 0.9 %
10 SYRINGE (ML) INJECTION AS NEEDED
Status: DISCONTINUED | OUTPATIENT
Start: 2024-09-14 | End: 2024-09-14 | Stop reason: HOSPADM

## 2024-09-14 RX ADMIN — POTASSIUM CHLORIDE 40 MEQ: 750 CAPSULE, EXTENDED RELEASE ORAL at 20:36

## 2024-09-14 RX ADMIN — ONDANSETRON 4 MG: 4 TABLET, ORALLY DISINTEGRATING ORAL at 19:15

## 2024-09-14 RX ADMIN — HYDROCODONE BITARTRATE AND ACETAMINOPHEN 1 TABLET: 7.5; 325 TABLET ORAL at 19:15

## 2024-09-14 NOTE — ED PROVIDER NOTES
Subjective   History of Present Illness  Pt is a 79 yo male presenting to ED with complaints of knee pain. PMHx significant for Afib, HTN, HLD, prostate cancer, CHF, Valvular heart disease, Psoriasis, RLS, MAE and GERD. Pt complains of left knee pain since replacement surgery 9-9-24 with Dr. Hull. He reports swelling and stiffness. He denies streaking redness. He denies fever or chills. He denies new numbness or weakness to extremities. He admits he does not tolerate pain well and hasn't been moving around. He has not started physical therapy. He has been taking Tylenol and Percocet for pain. He is on Xarelto and denies hx of DVT or PE. He denies CP or SOB. He denies tobacco, drug or ETOH use.     History provided by:  Medical records, patient and relative      Review of Systems   Constitutional:  Negative for chills and fever.   Respiratory:  Negative for shortness of breath.    Cardiovascular:  Positive for leg swelling (left). Negative for chest pain.   Gastrointestinal:  Negative for abdominal pain, diarrhea and vomiting.   Musculoskeletal:  Positive for arthralgias and joint swelling. Negative for back pain.   Skin:  Negative for color change.   Neurological:  Negative for weakness and numbness.       Past Medical History:   Diagnosis Date    Anemia 11/22/2005    Arthritis     multi joint jose left knee     Cancer     prostate per elevated PSA and biopsies     CHF (congestive heart failure)     Colonic polyp     Elevated PSA     Encounter for screening colonoscopy     Essential hypertension     Gastroesophageal reflux disease without esophagitis     H/O valvular heart disease     3 leaky valves- monitored by FMD and Dr Scott (sees every 6 months)    Hearing loss     hearing aids in both ears    Heart murmur     Hemorrhoid     bleeds on occasion     Hiatal hernia     High risk medication use     History of cellulitis     History of tobacco use     QUIT AT AGE 51    Hypercholesterolemia     Hyperglycemia      Hyperlipidemia     Knee swelling     Morbid obesity     Overactive bladder     Psoriasis     spots on legs     Restless leg syndrome     Sleep apnea with use of continuous positive airway pressure (CPAP)     10 auto setting    Visual impairment     Vitamin B12 deficiency     Vitamin D deficiency     Wears glasses     Wears partial dentures        Allergies   Allergen Reactions    Levofloxacin Itching and Rash    Meloxicam Swelling    Percocet [Oxycodone-Acetaminophen] Rash     Chest and back red       Past Surgical History:   Procedure Laterality Date    CARDIAC CATHETERIZATION N/A 01/08/2021    Procedure: Left Heart Cath 85350;  Surgeon: Reggie Scott MD;  Location:  [facility];  Service: Cardiovascular;  Laterality: N/A;    CARDIAC ELECTROPHYSIOLOGY PROCEDURE N/A 1/31/2024    Procedure: Ablation atrial fibrillation (PFA). DNS Xarelto. Hold Flecainide 3 days prior.;  Surgeon: Luis Atwood DO;  Location:  [facility];  Service: Cardiovascular;  Laterality: N/A;    COLONOSCOPY  11/10/2015    MOD SIGMOID DIVERTICULOSIS, NO POLYPS. PLAN: FIBER, REPEAT IN 10 YEARS.    COLONOSCOPY  11/22/2005    DIVERTICULOSIS, INT HEMERRHOIDS. NO POLYPS. EGD- REFLUX, NO BLEEDING. FOLLOW UP CAPSULE ENDOSCOPY AT  2012-NORMAL    CYSTOSCOPY AND NEEDLE BIOPSY PROSTATE      twice; last time 2017    PROSTATECTOMY N/A 10/03/2017    Procedure: PROSTATECTOMY LAPAROSCOPIC WITH DAVINCI ROBOT;  Surgeon: Jameel Ferguson Jr., MD;  Location:  [facility];  Service:     TOTAL KNEE ARTHROPLASTY Left 9/9/2024    Procedure: TOTAL KNEE ARTHROPLASTY WITH PREETI ROBOT LEFT;  Surgeon: Perez Hull MD;  Location:  [facility];  Service: Robotics - Ortho;  Laterality: Left;       Family History   Problem Relation Age of Onset    Stroke Mother     Hypertension Mother     Heart attack Father     Hypertension Father     Cirrhosis Brother        Social History     Socioeconomic History    Marital status:     Tobacco Use    Smoking status: Former     Current packs/day: 0.00     Average packs/day: 1 pack/day for 15.0 years (15.0 ttl pk-yrs)     Types: Cigarettes, Pipe, Cigars     Start date: 1990     Quit date: 2005     Years since quittin.7     Passive exposure: Past    Smokeless tobacco: Never    Tobacco comments:     smoked 3 cigars a day and pipe off and on    Vaping Use    Vaping status: Never Used    Passive vaping exposure: Yes   Substance and Sexual Activity    Alcohol use: Not Currently    Drug use: Never    Sexual activity: Not Currently     Partners: Female           Objective   Physical Exam  Vitals and nursing note reviewed.   Constitutional:       General: He is not in acute distress.  HENT:      Head: Atraumatic.   Eyes:      Extraocular Movements: Extraocular movements intact.      Conjunctiva/sclera: Conjunctivae normal.   Cardiovascular:      Rate and Rhythm: Normal rate.      Pulses: Normal pulses.   Pulmonary:      Effort: Pulmonary effort is normal. No respiratory distress.   Musculoskeletal:         General: Normal range of motion.      Cervical back: Normal range of motion and neck supple.      Left knee: Swelling and effusion present. No erythema. Decreased range of motion. Tenderness (mild diffuse) present. Normal pulse.      Comments: Consistent with recent post knee replacement   Skin:     General: Skin is warm.      Capillary Refill: Capillary refill takes less than 2 seconds.   Neurological:      General: No focal deficit present.      Mental Status: He is alert.   Psychiatric:         Mood and Affect: Mood normal.         Behavior: Behavior normal.         Procedures           ED Course  ED Course as of 24   Sat Sep 14, 2024   185 WBC: 5.86 [RT]   1916 Lactate: 1.6 [RT]   1949 Potassium(!): 3.2  Ordered replacement.  [RT]      ED Course User Index  [RT] Renee Arnold PA      Recent Results (from the past 24 hour(s))   Comprehensive Metabolic Panel    Collection  Time: 09/14/24  6:36 PM    Specimen: Arm, Left; Blood   Result Value Ref Range    Glucose 129 (H) 65 - 99 mg/dL    BUN 11 8 - 23 mg/dL    Creatinine 0.73 (L) 0.76 - 1.27 mg/dL    Sodium 141 136 - 145 mmol/L    Potassium 3.2 (L) 3.5 - 5.2 mmol/L    Chloride 104 98 - 107 mmol/L    CO2 26.0 22.0 - 29.0 mmol/L    Calcium 8.7 8.6 - 10.5 mg/dL    Total Protein 6.4 6.0 - 8.5 g/dL    Albumin 3.8 3.5 - 5.2 g/dL    ALT (SGPT) 12 1 - 41 U/L    AST (SGOT) 21 1 - 40 U/L    Alkaline Phosphatase 73 39 - 117 U/L    Total Bilirubin 1.2 0.0 - 1.2 mg/dL    Globulin 2.6 gm/dL    A/G Ratio 1.5 g/dL    BUN/Creatinine Ratio 15.1 7.0 - 25.0    Anion Gap 11.0 5.0 - 15.0 mmol/L    eGFR 93.7 >60.0 mL/min/1.73   Lactic Acid, Plasma    Collection Time: 09/14/24  6:36 PM    Specimen: Arm, Left; Blood   Result Value Ref Range    Lactate 1.6 0.5 - 2.0 mmol/L   Procalcitonin    Collection Time: 09/14/24  6:36 PM    Specimen: Arm, Left; Blood   Result Value Ref Range    Procalcitonin 0.05 0.00 - 0.25 ng/mL   Sedimentation Rate    Collection Time: 09/14/24  6:36 PM    Specimen: Arm, Left; Blood   Result Value Ref Range    Sed Rate 50 (H) 0 - 20 mm/hr   C-reactive Protein    Collection Time: 09/14/24  6:36 PM    Specimen: Arm, Left; Blood   Result Value Ref Range    C-Reactive Protein 2.62 (H) 0.00 - 0.50 mg/dL   CBC Auto Differential    Collection Time: 09/14/24  6:36 PM    Specimen: Arm, Left; Blood   Result Value Ref Range    WBC 5.86 3.40 - 10.80 10*3/mm3    RBC 3.67 (L) 4.14 - 5.80 10*6/mm3    Hemoglobin 11.7 (L) 13.0 - 17.7 g/dL    Hematocrit 34.4 (L) 37.5 - 51.0 %    MCV 93.7 79.0 - 97.0 fL    MCH 31.9 26.6 - 33.0 pg    MCHC 34.0 31.5 - 35.7 g/dL    RDW 13.2 12.3 - 15.4 %    RDW-SD 45.2 37.0 - 54.0 fl    MPV 9.9 6.0 - 12.0 fL    Platelets 187 140 - 450 10*3/mm3    Neutrophil % 74.5 42.7 - 76.0 %    Lymphocyte % 14.3 (L) 19.6 - 45.3 %    Monocyte % 8.0 5.0 - 12.0 %    Eosinophil % 2.4 0.3 - 6.2 %    Basophil % 0.5 0.0 - 1.5 %    Immature Grans %  "0.3 0.0 - 0.5 %    Neutrophils, Absolute 4.36 1.70 - 7.00 10*3/mm3    Lymphocytes, Absolute 0.84 0.70 - 3.10 10*3/mm3    Monocytes, Absolute 0.47 0.10 - 0.90 10*3/mm3    Eosinophils, Absolute 0.14 0.00 - 0.40 10*3/mm3    Basophils, Absolute 0.03 0.00 - 0.20 10*3/mm3    Immature Grans, Absolute 0.02 0.00 - 0.05 10*3/mm3    nRBC 0.0 0.0 - 0.2 /100 WBC   Green Top (Gel)    Collection Time: 09/14/24  6:36 PM   Result Value Ref Range    Extra Tube Hold for add-ons.    Lavender Top    Collection Time: 09/14/24  6:36 PM   Result Value Ref Range    Extra Tube hold for add-on    Gold Top - SST    Collection Time: 09/14/24  6:36 PM   Result Value Ref Range    Extra Tube Hold for add-ons.    Gray Top    Collection Time: 09/14/24  6:36 PM   Result Value Ref Range    Extra Tube Hold for add-ons.    Light Blue Top    Collection Time: 09/14/24  6:36 PM   Result Value Ref Range    Extra Tube Hold for add-ons.      Note: In addition to lab results from this visit, the labs listed above may include labs taken at another facility or during a different encounter within the last 24 hours. Please correlate lab times with ED admission and discharge times for further clarification of the services performed during this visit.    XR Knee 1 or 2 View Left   Final Result   Impression:      1. Intact left knee arthroplasty with no radiographic complications.   2. Questionable knee joint effusion.   3. Questionable soft tissue swelling over the anterior left knee. There has been resorption of the soft tissue gas from the postoperative study.         Electronically Signed: Polo Obregon MD     9/14/2024 6:55 PM EDT     Workstation ID: [id number]        Vitals:    09/14/24 1803   BP: 142/82   BP Location: Right arm   Patient Position: Sitting   Pulse: 89   Resp: 20   Temp: 98.3 °F (36.8 °C)   TempSrc: Oral   SpO2: 97%   Weight: 113 kg (250 lb)   Height: 167.6 cm (66\")     Medications   sodium chloride 0.9 % flush 10 mL (has no administration in time " range)   potassium chloride (MICRO-K/KLOR-CON) CR capsule (has no administration in time range)   HYDROcodone-acetaminophen (NORCO) 7.5-325 MG per tablet 1 tablet (1 tablet Oral Given 9/14/24 1915)   ondansetron ODT (ZOFRAN-ODT) disintegrating tablet 4 mg (4 mg Translingual Given 9/14/24 1915)     ECG/EMG Results (last 24 hours)       ** No results found for the last 24 hours. **          No orders to display       DISCHARGE    Patient discharged in stable condition.    Reviewed implications of results, diagnosis, meds, responsibility to follow up, warning signs and symptoms of possible worsening, potential complications and reasons to return to ER.    Patient/Family voiced understanding of above instructions.    Discussed plan for discharge, as there is no emergent indication for admission.  Pt/family is agreeable and understands need for follow up and possible repeat testing.  Pt/family is aware that discharge does not mean that nothing is wrong but that it indicates no emergency is currently present that requires admission and they must continue care with follow-up as given below or with a physician of their choice.     FOLLOW-UP  Perez Pompa MD  [address]  [place] [id number]  [phone]          Perez Hull MD  [address] [place] [address]  [phone]    Schedule an appointment as soon as possible for a visit       [facility] EMERGENCY DEPARTMENT  [address] [facility] [address] [phone]    If symptoms worsen         Medication List        New Prescriptions      naloxone 4 MG/0.1ML nasal spray  Commonly known as: NARCAN  Call 911. Don't prime. [place] in 1 nostril for overdose. Repeat in 2-3 minutes in other nostril if no or minimal breathing/responsiveness.            Changed      * oxyCODONE 5 MG immediate release tablet  Commonly known as: Roxicodone  Take 1 tablet by mouth Every 4 (Four) Hours As Needed for  Moderate Pain.  What changed: Another medication with the same name was added. Make sure you understand how and when to take each.     * oxyCODONE HCl 7.5 MG tablet  Take 1 tablet by mouth Every 6 (Six) Hours for 12 doses.  What changed: You were already taking a medication with the same name, and this prescription was added. Make sure you understand how and when to take each.           * This list has 2 medication(s) that are the same as other medications prescribed for you. Read the directions carefully, and ask your doctor or other care provider to review them with you.                   Where to Get Your Medications        These medications were sent to [id number] - [place], [place] - [address] - [phone]  - [phone] FX  [address], [facility] [address]      Phone: [phone]   naloxone 4 MG/0.1ML nasal spray  oxyCODONE HCl 7.5 MG tablet                                              Medical Decision Making  Pt is a 77 yo male presenting to ED with complaints of left knee pain. Patient is 5 days out of total left knee replacement. He has not had any fever or chills. Labs in ED notable for WBC 5.86, Lactic 1.6, Procal 0.05, Cr 0.73, Glucose 129, CRP 2.62, SR 50 and K 3.2 Xray of left knee with post operative findings and no acute emergent findings. No significant warmth or erythema to knee. Given replacement of potassium in ED. Discussed results and tx plan with patient and family. Will dc home and he will f/u closely with Dr. Hull. Provided slightly stronger pain meds, Oxycodone 7.5. Went over new / worse sx to return to ED.     Discussed patient with Dr. Schultz who is agreeable with ED course and tx plan.     DDx  Septic joint, post op complications, cellulitis, effusion, sepsis    Problems Addressed:  Acute pain of left knee: acute illness or injury  Anticoagulated: chronic illness or injury  History of atrial  fibrillation: chronic illness or injury  Status post left knee replacement: acute illness or injury    Amount and/or Complexity of Data Reviewed  Independent Historian:      Details: Family   External Data Reviewed: notes.     Details: Reviewed previous non ED visits including prior labs, imaging, available notes, medications, allergies and surgical hx.     Labs: ordered. Decision-making details documented in ED Course.  Radiology: ordered. Decision-making details documented in ED Course.    Risk  Prescription drug management.        Final diagnoses:   Acute pain of left knee   Status post left knee replacement   Anticoagulated   History of atrial fibrillation       ED Disposition  ED Disposition       ED Disposition   Discharge    Condition   Stable    Comment   --               Perez Pompa MD  [address]  [facility] [address]  [phone]          Perez Hull MD  [address] [facility] [address]  [place] [id number]  [phone]    Schedule an appointment as soon as possible for a visit       [facility] EMERGENCY DEPARTMENT  [address] [facility] [address] [phone]    If symptoms worsen         Medication List        New Prescriptions      naloxone 4 MG/0.1ML nasal spray  Commonly known as: NARCAN  Call 911. Don't prime. [place] in 1 nostril for overdose. Repeat in 2-3 minutes in other nostril if no or minimal breathing/responsiveness.            Changed      * oxyCODONE 5 MG immediate release tablet  Commonly known as: Roxicodone  Take 1 tablet by mouth Every 4 (Four) Hours As Needed for Moderate Pain.  What changed: Another medication with the same name was added. Make sure you understand how and when to take each.     * oxyCODONE HCl 7.5 MG tablet  Take 1 tablet by mouth Every 6 (Six) Hours for 12 doses.  What changed: You were already taking a medication with the same name, and this prescription was added. Make sure you understand how and when  to take each.           * This list has 2 medication(s) that are the same as other medications prescribed for you. Read the directions carefully, and ask your doctor or other care provider to review them with you.                   Where to Get Your Medications        These medications were sent to [id number] - [place], [place] - [address] - [phone]  - [phone]   [address] [address]      Phone: [phone]   naloxone 4 MG/0.1ML nasal spray  oxyCODONE HCl 7.5 MG tablet            Renee Arnold PA  09/14/24 2003

## 2024-09-15 RX ORDER — OXYCODONE HYDROCHLORIDE 5 MG/1
5 TABLET ORAL EVERY 6 HOURS PRN
Qty: 12 TABLET | Refills: 0 | Status: SHIPPED | OUTPATIENT
Start: 2024-09-15 | End: 2024-09-17 | Stop reason: SDUPTHER

## 2024-09-16 ENCOUNTER — TELEPHONE (OUTPATIENT)
Dept: ORTHOPEDIC SURGERY | Facility: CLINIC | Age: 79
End: 2024-09-16
Payer: MEDICARE

## 2024-09-17 ENCOUNTER — OFFICE VISIT (OUTPATIENT)
Dept: ORTHOPEDIC SURGERY | Facility: CLINIC | Age: 79
End: 2024-09-17
Payer: MEDICARE

## 2024-09-17 VITALS — TEMPERATURE: 97.3 F

## 2024-09-17 DIAGNOSIS — G89.18 ACUTE POSTOPERATIVE PAIN OF LEFT KNEE: ICD-10-CM

## 2024-09-17 DIAGNOSIS — Z96.652 STATUS POST TOTAL LEFT KNEE REPLACEMENT: Primary | ICD-10-CM

## 2024-09-17 DIAGNOSIS — M25.562 ACUTE POSTOPERATIVE PAIN OF LEFT KNEE: ICD-10-CM

## 2024-09-17 PROCEDURE — 99024 POSTOP FOLLOW-UP VISIT: CPT | Performed by: PHYSICIAN ASSISTANT

## 2024-09-17 PROCEDURE — 1159F MED LIST DOCD IN RCRD: CPT | Performed by: PHYSICIAN ASSISTANT

## 2024-09-17 PROCEDURE — 1160F RVW MEDS BY RX/DR IN RCRD: CPT | Performed by: PHYSICIAN ASSISTANT

## 2024-09-17 RX ORDER — OXYCODONE HCL 10 MG/1
10 TABLET, FILM COATED, EXTENDED RELEASE ORAL EVERY 12 HOURS
Qty: 20 TABLET | Refills: 0 | Status: SHIPPED | OUTPATIENT
Start: 2024-09-17

## 2024-09-17 RX ORDER — OXYCODONE HYDROCHLORIDE 5 MG/1
5 TABLET ORAL EVERY 4 HOURS PRN
Qty: 40 TABLET | Refills: 0 | Status: SHIPPED | OUTPATIENT
Start: 2024-09-17 | End: 2024-09-20

## 2024-09-19 LAB
BACTERIA SPEC AEROBE CULT: NORMAL
BACTERIA SPEC AEROBE CULT: NORMAL

## 2024-09-26 ENCOUNTER — TELEPHONE (OUTPATIENT)
Dept: ORTHOPEDIC SURGERY | Facility: CLINIC | Age: 79
End: 2024-09-26
Payer: MEDICARE

## 2024-10-01 ENCOUNTER — OFFICE VISIT (OUTPATIENT)
Dept: ORTHOPEDIC SURGERY | Facility: CLINIC | Age: 79
End: 2024-10-01
Payer: MEDICARE

## 2024-10-01 DIAGNOSIS — Z96.652 S/P TKR (TOTAL KNEE REPLACEMENT), LEFT: ICD-10-CM

## 2024-10-01 DIAGNOSIS — Z96.652 STATUS POST TOTAL LEFT KNEE REPLACEMENT: Primary | ICD-10-CM

## 2024-10-01 PROCEDURE — 1160F RVW MEDS BY RX/DR IN RCRD: CPT | Performed by: ORTHOPAEDIC SURGERY

## 2024-10-01 PROCEDURE — 99024 POSTOP FOLLOW-UP VISIT: CPT | Performed by: ORTHOPAEDIC SURGERY

## 2024-10-01 PROCEDURE — 1159F MED LIST DOCD IN RCRD: CPT | Performed by: ORTHOPAEDIC SURGERY

## 2024-10-01 RX ORDER — OXYCODONE HYDROCHLORIDE 5 MG/1
5 TABLET ORAL EVERY 4 HOURS PRN
Qty: 30 TABLET | Refills: 0 | Status: SHIPPED | OUTPATIENT
Start: 2024-10-01

## 2024-10-01 NOTE — PROGRESS NOTES
Orthopaedic Clinic Note:  Knee Post Op    Chief Complaint   Patient presents with    Post-op     2 week follow up--3.5 week S/P Total Knee Arthroplasty With Suhail Robot Left(DOS 9/9/24)        HPI    Mr. Parikh is 3  week(s) s/p left total knee arthroplasty.  Rates pain 6/10. He is ambulating with a walker and is taking Oxycodone for pain control. He denies fevers, chills, or constitutional symptoms.  He is continuing outpatient PT. Patient is improving overall.  Denies complications.  Overall he is making progress..    I have reviewed the following portions of the patient's history:History of Present Illness    Past Medical History:   Diagnosis Date    Anemia 11/22/2005    Arthritis     multi joint jose left knee     Cancer     prostate per elevated PSA and biopsies     CHF (congestive heart failure)     Colonic polyp     Elevated PSA     Encounter for screening colonoscopy     Essential hypertension     Gastroesophageal reflux disease without esophagitis     H/O valvular heart disease     3 leaky valves- monitored by [facility] and Dr Scott (sees every 6 months)    Hearing loss     hearing aids in both ears    Heart murmur     Hemorrhoid     bleeds on occasion     Hiatal hernia     High risk medication use     History of cellulitis     History of tobacco use     QUIT AT AGE 51    Hypercholesterolemia     Hyperglycemia     Hyperlipidemia     Knee swelling     Morbid obesity     Overactive bladder     Psoriasis     spots on legs     Restless leg syndrome     Sleep apnea with use of continuous positive airway pressure (CPAP)     10 auto setting    Visual impairment     Vitamin B12 deficiency     Vitamin D deficiency     Wears glasses     Wears partial dentures       Past Surgical History:   Procedure Laterality Date    CARDIAC CATHETERIZATION N/A 01/08/2021    Procedure: Left Heart Cath 51578;  Surgeon: Reggie Scott MD;  Location: [facility] CATH INVASIVE LOCATION;  Service: Cardiovascular;  Laterality: N/A;    CARDIAC  ELECTROPHYSIOLOGY PROCEDURE N/A 2024    Procedure: Ablation atrial fibrillation (PFA). DNS Xarelto. Hold Flecainide 3 days prior.;  Surgeon: Luis Atwood DO;  Location:  [facility] INVAS[facility] LOCATION;  Service: Cardiovascular;  Laterality: N/A;    COLONOSCOPY  11/10/2015    MOD SIGMOID DIVERTICULOSIS, NO POLYPS. PLAN: FIBER, REPEAT IN 10 YEARS.    COLONOSCOPY  2005    DIVERTICULOSIS, INT HEMERRHOIDS. NO POLYPS. EGD- REFLUX, NO BLEEDING. FOLLOW UP CAPSULE ENDOSCOPY AT  -NORMAL    CYSTOSCOPY AND NEEDLE BIOPSY PROSTATE      twice; last time 2017    PROSTATECTOMY N/A 10/03/2017    Procedure: PROSTATECTOMY LAPAROSCOPIC WITH DAVINCI ROBOT;  Surgeon: Jameel Ferguson Jr., MD;  Location:  [facility];  Service:     TOTAL KNEE ARTHROPLASTY Left 2024    Procedure: TOTAL KNEE ARTHROPLASTY WITH PREETI ROBOT LEFT;  Surgeon: Perez Hull MD;  Location:  [facility];  Service: Robotics - Ortho;  Laterality: Left;     Family History   Problem Relation Age of Onset    Stroke Mother     Hypertension Mother     Heart attack Father     Hypertension Father     Cirrhosis Brother       Social History     Socioeconomic History    Marital status:    Tobacco Use    Smoking status: Former     Current packs/day: 0.00     Average packs/day: 1 pack/day for 15.0 years (15.0 ttl pk-yrs)     Types: Cigarettes, Pipe, Cigars     Start date: 1990     Quit date: 2005     Years since quittin.7     Passive exposure: Past    Smokeless tobacco: Never    Tobacco comments:     smoked 3 cigars a day and pipe off and on    Vaping Use    Vaping status: Never Used    Passive vaping exposure: Yes   Substance and Sexual Activity    Alcohol use: Not Currently    Drug use: Never    Sexual activity: Not Currently     Partners: Female      Current Outpatient Medications on File Prior to Visit   Medication Sig Dispense Refill    aspirin 81 MG EC tablet Take 1 tablet by mouth Daily. Stopped for surgery      atorvastatin  (LIPITOR) 20 MG tablet Take 1 tablet by mouth Every Night. 90 tablet 3    doxazosin (CARDURA) 4 MG tablet Take 1 tablet by mouth Every Night. 90 tablet 2    Entresto 24-26 MG tablet Take 1 tablet by mouth 2 (Two) Times a Day. 180 tablet 2    fluticasone (FLONASE) 50 MCG/ACT nasal spray Administer 2 sprays into the nostril(s) as directed by provider Daily.      metoprolol tartrate (LOPRESSOR) 25 MG tablet Take 1 tablet by mouth 2 (Two) Times a Day. 180 tablet 2    multivitamin with minerals (MULTIVITAMIN ADULT PO) Take 1 tablet by mouth Daily.      naloxone (NARCAN) 4 MG/0.1ML nasal spray Call 911. Don't prime. [place] in 1 nostril for overdose. Repeat in 2-3 minutes in other nostril if no or minimal breathing/responsiveness. 2 each 0    oxyCODONE (OxyCONTIN) 10 MG 12 hr tablet Take 1 tablet by mouth Every 12 (Twelve) Hours. 20 tablet 0    pantoprazole (PROTONIX) 40 MG EC tablet Take 1 tablet by mouth Daily. 90 tablet 2    rivaroxaban (Xarelto) 10 MG tablet Take 1 tablet by mouth Daily. 4 tablet 0    rivaroxaban (XARELTO) 20 MG tablet Take 1 tablet by mouth Daily.      ropivacaine (NAROPIN) 0.2 % infusion (INFUSYSTEM) 2 mg/hr by Peripheral Nerve route Continuous.      Vibegron (Gemtesa) 75 MG tablet Take 1 tablet by mouth Daily.      [DISCONTINUED] oxyCODONE (Roxicodone) 5 MG immediate release tablet Take 1 tablet by mouth Every 4 (Four) Hours As Needed for Moderate Pain. 30 tablet 0     No current facility-administered medications on file prior to visit.      Allergies   Allergen Reactions    Levofloxacin Itching and Rash    Meloxicam Swelling    Percocet [Oxycodone-Acetaminophen] Rash     Chest and back red        Review of Systems   Constitutional: Negative.    HENT: Negative.     Eyes: Negative.    Respiratory: Negative.     Cardiovascular: Negative.    Gastrointestinal: Negative.    Endocrine: Negative.    Genitourinary: Negative.    Musculoskeletal:  Positive for arthralgias.   Skin: Negative.     Allergic/Immunologic: Negative.    Neurological: Negative.    Hematological: Negative.    Psychiatric/Behavioral: Negative.          Physical Exam  There were no vitals taken for this visit.    There is no height or weight on file to calculate BMI.    GENERAL APPEARANCE: awake, alert, oriented, in no acute distress and well developed, well nourished  LUNGS:  breathing nonlabored  EXTREMITIES: no clubbing, cyanosis  PERIPHERAL PULSES: palpable dorsalis pedis and posterior tibial pulses bilaterally.    GAIT:  Antalgic          Left Knee Exam:  ----------  ALIGNMENT: neutral  ----------  RANGE OF MOTION:  Decreased (5 - 85 degrees) with no extensor lag  LIGAMENTOUS STABILITY:   stable to varus and valgus stress at terminal extension and 30 degrees without any evidence of laxity  ----------  STRENGTH:  KNEE FLEXION 4/5  KNEE EXTENSION  4/5  ANKLE DORSIFLEXION  5/5  ANKLE PLANTARFLEXION  5/5  ----------  PAIN WITH PALPATION:global  KNEE EFFUSION: yes, trace effusion  PAIN WITH KNEE ROM: no  PATELLAR CREPITUS:  no  ----------  SENSATION TO LIGHT TOUCH:  DEEP PERONEAL/SUPERFICIAL PERONEAL/SURAL/SAPHENOUS/TIBIAL:    intact  ----------  EDEMA:  no  ERYTHEMA:    no  WOUNDS/INCISIONS:   yes, well healed surgical incision without evidence of erythema or drainage  _____________________________________________________________________  _____________________________________________________________________    RADIOGRAPHIC FINDINGS:   Indication: Status post left total knee arthroplasty    Comparison: Todays xrays were compared to previous xrays from 9/17/2024    Knee films: Demonstrate well positioned knee arthroplasty components in satisfactory alignment without evidence of wear, loosening, subsidence, fracture, or osteolysis and No significant changes compared to prior radiographs.       Assessment/Plan:   Diagnosis Plan   1. Status post total left knee replacement  XR Knee 3+ View With [place] Left      2. S/P TKR (total knee  replacement), left  oxyCODONE (Roxicodone) 5 MG immediate release tablet        Patient is doing well 3 weeks status post left total knee arthroplasty.  Encouraged him to continue working on range of motion with physical therapy on both flexion and extension to achieve a goal of 120 degrees of flexion and full extension.  Wean from the walker to a cane as directed by physical therapy.  I will see him back in 3 weeks for repeat assessment with repeat x-ray 3 views left knee on return.  Refill of oxycodone was provided.    Perez Hull MD  10/01/24  14:02 EDT

## 2024-10-09 ENCOUNTER — TELEPHONE (OUTPATIENT)
Dept: ORTHOPEDIC SURGERY | Facility: CLINIC | Age: 79
End: 2024-10-09
Payer: MEDICARE

## 2024-10-09 RX ORDER — METHOCARBAMOL 750 MG/1
750 TABLET, FILM COATED ORAL 4 TIMES DAILY PRN
Qty: 60 TABLET | Refills: 0 | Status: SHIPPED | OUTPATIENT
Start: 2024-10-09

## 2024-10-09 NOTE — TELEPHONE ENCOUNTER
Tejas from [facility] physical therapy called stating that the patient is not making any progress in physical therapy and that the patients quadriceps is stuck in a range of motion of 90 degrees and he also stated that it might be a soft tissue problem     Please advise, thank you

## 2024-10-09 NOTE — TELEPHONE ENCOUNTER
Caller: DONAVON GUERRIER    Phone Number: [phone] (home)       Reason for Call:   PATIENT CALLED IN REQUESTING FOR MUSCLE RELAXER'S PER PHYSICAL THERAPIST DUE TO HAMSTRING MUSCLE BEING REALLY TIGHT   THE [facility] ON FILE IS THE PHARMACY PATIENT WOULD LIKE TO USE.

## 2024-10-22 ENCOUNTER — OFFICE VISIT (OUTPATIENT)
Dept: ORTHOPEDIC SURGERY | Facility: CLINIC | Age: 79
End: 2024-10-22
Payer: MEDICARE

## 2024-10-22 DIAGNOSIS — Z96.652 S/P TKR (TOTAL KNEE REPLACEMENT), LEFT: ICD-10-CM

## 2024-10-22 DIAGNOSIS — Z96.652 STATUS POST TOTAL LEFT KNEE REPLACEMENT: Primary | ICD-10-CM

## 2024-10-22 RX ORDER — OXYCODONE HYDROCHLORIDE 5 MG/1
5 TABLET ORAL EVERY 4 HOURS PRN
Qty: 30 TABLET | Refills: 0 | Status: SHIPPED | OUTPATIENT
Start: 2024-10-22

## 2024-10-22 NOTE — PROGRESS NOTES
Orthopaedic Clinic Note:  Knee Post Op    Chief Complaint   Patient presents with    Post-op     3 WK F/U- S/P Total Knee Arthroplasty With Suhail Robot Left(DOS 9/9/24)        HPI    Mr. Parikh is 6  week(s) s/p left total knee arthroplasty. Rates pain 7/10. He is ambulating with no assistive device and is taking Tylenol for pain control. He denies fevers, chills, or constitutional symptoms.  He is continuing outpatient PT. Patient is improving overall.  He has been slow with therapy progression due to poor pain tolerance after surgery.  However he is starting to improve.    Past Medical History:   Diagnosis Date    Anemia 11/22/2005    Arthritis     multi joint jose left knee     Cancer     prostate per elevated PSA and biopsies     CHF (congestive heart failure)     Colonic polyp     Elevated PSA     Encounter for screening colonoscopy     Essential hypertension     Gastroesophageal reflux disease without esophagitis     H/O valvular heart disease     3 leaky valves- monitored by [facility] and Dr Scott (sees every 6 months)    Hearing loss     hearing aids in both ears    Heart murmur     Hemorrhoid     bleeds on occasion     Hiatal hernia     High risk medication use     History of cellulitis     History of tobacco use     QUIT AT AGE 51    Hypercholesterolemia     Hyperglycemia     Hyperlipidemia     Knee swelling     Morbid obesity     Overactive bladder     Psoriasis     spots on legs     Restless leg syndrome     Sleep apnea with use of continuous positive airway pressure (CPAP)     10 auto setting    Visual impairment     Vitamin B12 deficiency     Vitamin D deficiency     Wears glasses     Wears partial dentures       Past Surgical History:   Procedure Laterality Date    CARDIAC CATHETERIZATION N/A 01/08/2021    Procedure: Left Heart Cath 48220;  Surgeon: Reggie Scott MD;  Location: [facility] CATH INVASIVE LOCATION;  Service: Cardiovascular;  Laterality: N/A;    CARDIAC ELECTROPHYSIOLOGY PROCEDURE N/A  2024    Procedure: Ablation atrial fibrillation (PFA). DNS Xarelto. Hold Flecainide 3 days prior.;  Surgeon: Luis Atwood DO;  Location:  [facility];  Service: Cardiovascular;  Laterality: N/A;    COLONOSCOPY  11/10/2015    MOD SIGMOID DIVERTICULOSIS, NO POLYPS. PLAN: FIBER, REPEAT IN 10 YEARS.    COLONOSCOPY  2005    DIVERTICULOSIS, INT HEMERRHOIDS. NO POLYPS. EGD- REFLUX, NO BLEEDING. FOLLOW UP CAPSULE ENDOSCOPY AT  -NORMAL    CYSTOSCOPY AND NEEDLE BIOPSY PROSTATE      twice; last time 2017    PROSTATECTOMY N/A 10/03/2017    Procedure: PROSTATECTOMY LAPAROSCOPIC WITH DAVINCI ROBOT;  Surgeon: Jameel Ferguson Jr., MD;  Location:  [facility];  Service:     TOTAL KNEE ARTHROPLASTY Left 2024    Procedure: TOTAL KNEE ARTHROPLASTY WITH PREETI ROBOT LEFT;  Surgeon: Perez Hull MD;  Location:  [facility];  Service: Robotics - Ortho;  Laterality: Left;     Family History   Problem Relation Age of Onset    Stroke Mother     Hypertension Mother     Heart attack Father     Hypertension Father     Cirrhosis Brother       Social History     Socioeconomic History    Marital status:    Tobacco Use    Smoking status: Former     Current packs/day: 0.00     Average packs/day: 1 pack/day for 15.0 years (15.0 ttl pk-yrs)     Types: Cigarettes, Pipe, Cigars     Start date: 1990     Quit date: 2005     Years since quittin.8     Passive exposure: Past    Smokeless tobacco: Never    Tobacco comments:     smoked 3 cigars a day and pipe off and on    Vaping Use    Vaping status: Never Used    Passive vaping exposure: Yes   Substance and Sexual Activity    Alcohol use: Not Currently    Drug use: Never    Sexual activity: Not Currently     Partners: Female      Current Outpatient Medications on File Prior to Visit   Medication Sig Dispense Refill    aspirin 81 MG EC tablet Take 1 tablet by mouth Daily. Stopped for surgery      atorvastatin (LIPITOR) 20 MG tablet Take 1 tablet  by mouth Every Night. 90 tablet 3    doxazosin (CARDURA) 4 MG tablet Take 1 tablet by mouth Every Night. 90 tablet 2    Entresto 24-26 MG tablet Take 1 tablet by mouth 2 (Two) Times a Day. 180 tablet 2    fluticasone (FLONASE) 50 MCG/ACT nasal spray Administer 2 sprays into the nostril(s) as directed by provider Daily.      methocarbamol (ROBAXIN) 750 MG tablet Take 1 tablet by mouth 4 (Four) Times a Day As Needed for Muscle Spasms. 60 tablet 0    metoprolol tartrate (LOPRESSOR) 25 MG tablet Take 1 tablet by mouth 2 (Two) Times a Day. 180 tablet 2    multivitamin with minerals (MULTIVITAMIN ADULT PO) Take 1 tablet by mouth Daily.      naloxone (NARCAN) 4 MG/0.1ML nasal spray Call 911. Don't prime. [place] in 1 nostril for overdose. Repeat in 2-3 minutes in other nostril if no or minimal breathing/responsiveness. 2 each 0    oxyCODONE (OxyCONTIN) 10 MG 12 hr tablet Take 1 tablet by mouth Every 12 (Twelve) Hours. 20 tablet 0    oxyCODONE (Roxicodone) 5 MG immediate release tablet Take 1 tablet by mouth Every 4 (Four) Hours As Needed for Moderate Pain. 30 tablet 0    pantoprazole (PROTONIX) 40 MG EC tablet Take 1 tablet by mouth Daily. 90 tablet 2    rivaroxaban (Xarelto) 10 MG tablet Take 1 tablet by mouth Daily. 4 tablet 0    rivaroxaban (XARELTO) 20 MG tablet Take 1 tablet by mouth Daily.      ropivacaine (NAROPIN) 0.2 % infusion (INFUSYSTEM) 2 mg/hr by Peripheral Nerve route Continuous.      Vibegron (Gemtesa) 75 MG tablet Take 1 tablet by mouth Daily.       No current facility-administered medications on file prior to visit.      Allergies   Allergen Reactions    Levofloxacin Itching and Rash    Meloxicam Swelling    Percocet [Oxycodone-Acetaminophen] Rash     Chest and back red        Review of Systems   Constitutional: Negative.    HENT: Negative.     Eyes: Negative.    Respiratory: Negative.     Cardiovascular: Negative.    Gastrointestinal: Negative.    Endocrine: Negative.    Genitourinary: Negative.     Musculoskeletal:  Positive for arthralgias.   Skin: Negative.    Allergic/Immunologic: Negative.    Neurological: Negative.    Hematological: Negative.    Psychiatric/Behavioral: Negative.        Physical Exam  There were no vitals taken for this visit.    There is no height or weight on file to calculate BMI.    GENERAL APPEARANCE: awake, alert, oriented, in no acute distress and well developed, well nourished  LUNGS:  breathing nonlabored  EXTREMITIES: no clubbing, cyanosis  PERIPHERAL PULSES: palpable dorsalis pedis and posterior tibial pulses bilaterally.    GAIT:  Normal          Left Knee Exam:  ----------  ALIGNMENT: neutral  ----------  RANGE OF MOTION:  Decreased (3 - 100 degrees) with no extensor lag  LIGAMENTOUS STABILITY:   stable to varus and valgus stress at terminal extension and 30 degrees without any evidence of laxity  ----------  STRENGTH:  KNEE FLEXION 4/5  KNEE EXTENSION  4/5  ANKLE DORSIFLEXION  5/5  ANKLE PLANTARFLEXION  5/5  ----------  PAIN WITH PALPATION:global  KNEE EFFUSION: yes, trace effusion  PAIN WITH KNEE ROM: no  PATELLAR CREPITUS:  no  ----------  SENSATION TO LIGHT TOUCH:  DEEP PERONEAL/SUPERFICIAL PERONEAL/SURAL/SAPHENOUS/TIBIAL:    intact  ----------  EDEMA:  no  ERYTHEMA:    no  WOUNDS/INCISIONS:   yes, well healed surgical incision without evidence of erythema or drainage  _____________________________________________________________________  _____________________________________________________________________    RADIOGRAPHIC FINDINGS:   Indication: Status post left total knee arthroplasty    Comparison: Todays xrays were compared to previous xrays from 10/1/2024    Knee films: Demonstrate well positioned knee arthroplasty components in satisfactory alignment without evidence of wear, loosening, subsidence, fracture, or osteolysis and No significant changes compared to prior radiographs.       Assessment/Plan:   Diagnosis Plan   1. Status post total left knee replacement   XR Knee 3+ View With [place] Left        I reassured the patient that he is improving.  However he is behind on his range of motion.  His goal is at least 115 degrees of flexion.  He is currently only at 100 degrees of flexion.  I will give in 3 weeks to gain the necessary motion or else we will need to proceed with manipulation under anesthesia.  Patient agrees to work aggressively for range of motion exercises with therapy over the next 3 weeks.  Follow-up in 3 weeks for range of motion assessment.  No x-ray needed on return    Marion Goldman  10/22/24  14:10 EDT

## 2024-11-14 ENCOUNTER — OFFICE VISIT (OUTPATIENT)
Dept: ORTHOPEDIC SURGERY | Facility: CLINIC | Age: 79
End: 2024-11-14
Payer: MEDICARE

## 2024-11-14 DIAGNOSIS — Z96.652 STATUS POST TOTAL LEFT KNEE REPLACEMENT: Primary | ICD-10-CM

## 2024-11-14 NOTE — PROGRESS NOTES
Orthopaedic Clinic Note:  Knee Post Op    Chief Complaint   Patient presents with    Post-op     3 week follow up -9 weeks S/P Total Knee Arthroplasty With Suhail Robot Left(DOS 9/9/24)        HPI    Mr. Parihk is 9  week(s) s/p left total knee arthroplasty.  He rates pain a 5/10 on the pain scale.  He has been working aggressively with therapy to work on range of motion.  He has made progress in his overall feeling better.  His main complaint is stiffness with terminal flexion of the knee.    Past Medical History:   Diagnosis Date    Anemia 11/22/2005    Arthritis     multi joint jose left knee     Cancer     prostate per elevated PSA and biopsies     CHF (congestive heart failure)     Colonic polyp     Elevated PSA     Encounter for screening colonoscopy     Essential hypertension     Gastroesophageal reflux disease without esophagitis     H/O valvular heart disease     3 leaky valves- monitored by FMD and Dr Scott (sees every 6 months)    Hearing loss     hearing aids in both ears    Heart murmur     Hemorrhoid     bleeds on occasion     Hiatal hernia     High risk medication use     History of cellulitis     History of tobacco use     QUIT AT AGE 51    Hypercholesterolemia     Hyperglycemia     Hyperlipidemia     Knee swelling     Morbid obesity     Overactive bladder     Psoriasis     spots on legs     Restless leg syndrome     Sleep apnea with use of continuous positive airway pressure (CPAP)     10 auto setting    Visual impairment     Vitamin B12 deficiency     Vitamin D deficiency     Wears glasses     Wears partial dentures       Past Surgical History:   Procedure Laterality Date    CARDIAC CATHETERIZATION N/A 01/08/2021    Procedure: Left Heart Cath 35703;  Surgeon: Reggie Scott MD;  Location: [facility] CATH INVASIVE LOCATION;  Service: Cardiovascular;  Laterality: N/A;    CARDIAC ELECTROPHYSIOLOGY PROCEDURE N/A 1/31/2024    Procedure: Ablation atrial fibrillation (PFA). DNS Xarelto. Hold Flecainide  3 days prior.;  Surgeon: Luis Atwood DO;  Location:  [facility] INVASIVE LOCATION;  Service: Cardiovascular;  Laterality: N/A;    COLONOSCOPY  11/10/2015    MOD SIGMOID DIVERTICULOSIS, NO POLYPS. PLAN: FIBER, REPEAT IN 10 YEARS.    COLONOSCOPY  2005    DIVERTICULOSIS, INT HEMERRHOIDS. NO POLYPS. EGD- REFLUX, NO BLEEDING. FOLLOW UP CAPSULE ENDOSCOPY AT  -NORMAL    CYSTOSCOPY AND NEEDLE BIOPSY PROSTATE      twice; last time 2017    PROSTATECTOMY N/A 10/03/2017    Procedure: PROSTATECTOMY LAPAROSCOPIC WITH DAVINCI ROBOT;  Surgeon: Jameel Ferguson Jr., MD;  Location:  [facility];  Service:     TOTAL KNEE ARTHROPLASTY Left 2024    Procedure: TOTAL KNEE ARTHROPLASTY WITH PREETI ROBOT LEFT;  Surgeon: Perez Hull MD;  Location:  [facility];  Service: Robotics - Ortho;  Laterality: Left;     Family History   Problem Relation Age of Onset    Stroke Mother     Hypertension Mother     Heart attack Father     Hypertension Father     Cirrhosis Brother       Social History     Socioeconomic History    Marital status:    Tobacco Use    Smoking status: Former     Current packs/day: 0.00     Average packs/day: 1 pack/day for 15.0 years (15.0 ttl pk-yrs)     Types: Cigarettes, Pipe, Cigars     Start date: 1990     Quit date: 2005     Years since quittin.8     Passive exposure: Past    Smokeless tobacco: Never    Tobacco comments:     smoked 3 cigars a day and pipe off and on    Vaping Use    Vaping status: Never Used    Passive vaping exposure: Yes   Substance and Sexual Activity    Alcohol use: Not Currently    Drug use: Never    Sexual activity: Not Currently     Partners: Female      Current Outpatient Medications on File Prior to Visit   Medication Sig Dispense Refill    aspirin 81 MG EC tablet Take 1 tablet by mouth Daily. Stopped for surgery      atorvastatin (LIPITOR) 20 MG tablet Take 1 tablet by mouth Every Night. 90 tablet 3    doxazosin (CARDURA) 4 MG tablet Take 1 tablet by  mouth Every Night. 90 tablet 2    Entresto 24-26 MG tablet Take 1 tablet by mouth 2 (Two) Times a Day. 180 tablet 2    fluticasone (FLONASE) 50 MCG/ACT nasal spray Administer 2 sprays into the nostril(s) as directed by provider Daily.      methocarbamol (ROBAXIN) 750 MG tablet Take 1 tablet by mouth 4 (Four) Times a Day As Needed for Muscle Spasms. 60 tablet 0    metoprolol tartrate (LOPRESSOR) 25 MG tablet Take 1 tablet by mouth 2 (Two) Times a Day. 180 tablet 2    multivitamin with minerals (MULTIVITAMIN ADULT PO) Take 1 tablet by mouth Daily.      naloxone (NARCAN) 4 MG/0.1ML nasal spray Call 911. Don't prime. Dyer in 1 nostril for overdose. Repeat in 2-3 minutes in other nostril if no or minimal breathing/responsiveness. 2 each 0    oxyCODONE (OxyCONTIN) 10 MG 12 hr tablet Take 1 tablet by mouth Every 12 (Twelve) Hours. 20 tablet 0    oxyCODONE (Roxicodone) 5 MG immediate release tablet Take 1 tablet by mouth Every 4 (Four) Hours As Needed for Moderate Pain. 30 tablet 0    pantoprazole (PROTONIX) 40 MG EC tablet Take 1 tablet by mouth Daily. 90 tablet 2    rivaroxaban (Xarelto) 10 MG tablet Take 1 tablet by mouth Daily. 4 tablet 0    rivaroxaban (XARELTO) 20 MG tablet Take 1 tablet by mouth Daily.      Vibegron (Gemtesa) 75 MG tablet Take 1 tablet by mouth Daily.       No current facility-administered medications on file prior to visit.      Allergies   Allergen Reactions    Levofloxacin Itching and Rash    Meloxicam Swelling    Percocet [Oxycodone-Acetaminophen] Rash     Chest and back red        Review of Systems   Constitutional: Negative.    HENT: Negative.     Eyes: Negative.    Respiratory: Negative.     Cardiovascular: Negative.    Gastrointestinal: Negative.    Endocrine: Negative.    Genitourinary: Negative.    Musculoskeletal:  Positive for arthralgias.   Skin: Negative.    Allergic/Immunologic: Negative.    Neurological: Negative.    Hematological: Negative.    Psychiatric/Behavioral: Negative.           Physical Exam  There were no vitals taken for this visit.    There is no height or weight on file to calculate BMI.    GENERAL APPEARANCE: awake, alert, oriented, in no acute distress and well developed, well nourished  LUNGS:  breathing nonlabored  EXTREMITIES: no clubbing, cyanosis  PERIPHERAL PULSES: palpable dorsalis pedis and posterior tibial pulses bilaterally.    GAIT:  Normal          Left Knee Exam:  ----------  ALIGNMENT: neutral  ----------  RANGE OF MOTION:  Decreased (0 - 105 degrees) with no extensor lag  LIGAMENTOUS STABILITY:   stable to varus and valgus stress at terminal extension and 30 degrees without any evidence of laxity  ----------  STRENGTH:  KNEE FLEXION 5/5  KNEE EXTENSION  5/5  ANKLE DORSIFLEXION  5/5  ANKLE PLANTARFLEXION  5/5  ----------  PAIN WITH PALPATION:denies tenderness to palpation about the knee  KNEE EFFUSION: yes, trace effusion  PAIN WITH KNEE ROM: no  PATELLAR CREPITUS:  no  ----------  SENSATION TO LIGHT TOUCH:  DEEP PERONEAL/SUPERFICIAL PERONEAL/SURAL/SAPHENOUS/TIBIAL:    intact  ----------  EDEMA:  no  ERYTHEMA:    no  WOUNDS/INCISIONS:   yes, well healed surgical incision without evidence of erythema or drainage  _____________________________________________________________________  _____________________________________________________________________    RADIOGRAPHIC FINDINGS:   No new imaging today    Assessment/Plan:   Diagnosis Plan   1. Status post total left knee replacement          Patient still lacks approximately 10 to 15 degrees of terminal knee flexion.  I discussed the risks and benefits of manipulation under anesthesia.  After we discussed the risks and benefits, the patient declines manipulation and wishes to continue with conservative treatment and therapy to regain range of motion.  I explained that it is crucial that we perform the manipulation as early as possible to optimize outcome.  The patient is adamant that he does not desire a manipulation at  this time or anytime in the future and will simply do physical therapy.  Therefore I will see him back in 6 weeks for repeat assessment x-ray 3 views left knee on return.    Perez Hull MD  11/14/24  13:28 EST

## 2024-12-02 ENCOUNTER — LAB (OUTPATIENT)
Dept: FAMILY MEDICINE CLINIC | Facility: CLINIC | Age: 79
End: 2024-12-02
Payer: MEDICARE

## 2024-12-02 DIAGNOSIS — Z11.59 NEED FOR HEPATITIS C SCREENING TEST: ICD-10-CM

## 2024-12-02 DIAGNOSIS — E78.5 HYPERLIPIDEMIA, UNSPECIFIED HYPERLIPIDEMIA TYPE: ICD-10-CM

## 2024-12-02 DIAGNOSIS — E53.8 VITAMIN B12 DEFICIENCY: ICD-10-CM

## 2024-12-02 DIAGNOSIS — I10 ESSENTIAL HYPERTENSION: ICD-10-CM

## 2024-12-02 DIAGNOSIS — R73.9 HYPERGLYCEMIA: ICD-10-CM

## 2024-12-02 DIAGNOSIS — D50.8 OTHER IRON DEFICIENCY ANEMIA: ICD-10-CM

## 2024-12-02 DIAGNOSIS — I50.32 CHRONIC HEART FAILURE WITH PRESERVED EJECTION FRACTION (HFPEF): ICD-10-CM

## 2024-12-03 LAB
ALBUMIN SERPL-MCNC: 4.4 G/DL (ref 3.8–4.8)
ALP SERPL-CCNC: 93 IU/L (ref 44–121)
ALT SERPL-CCNC: 10 IU/L (ref 0–44)
AST SERPL-CCNC: 17 IU/L (ref 0–40)
BASOPHILS # BLD AUTO: 0 X10E3/UL (ref 0–0.2)
BASOPHILS NFR BLD AUTO: 1 %
BILIRUB SERPL-MCNC: 0.7 MG/DL (ref 0–1.2)
BUN SERPL-MCNC: 16 MG/DL (ref 8–27)
BUN/CREAT SERPL: 18 (ref 10–24)
CALCIUM SERPL-MCNC: 9.4 MG/DL (ref 8.6–10.2)
CHLORIDE SERPL-SCNC: 106 MMOL/L (ref 96–106)
CHOLEST SERPL-MCNC: 146 MG/DL (ref 100–199)
CO2 SERPL-SCNC: 25 MMOL/L (ref 20–29)
CREAT SERPL-MCNC: 0.88 MG/DL (ref 0.76–1.27)
EGFRCR SERPLBLD CKD-EPI 2021: 88 ML/MIN/1.73
EOSINOPHIL # BLD AUTO: 0.5 X10E3/UL (ref 0–0.4)
EOSINOPHIL NFR BLD AUTO: 9 %
ERYTHROCYTE [DISTWIDTH] IN BLOOD BY AUTOMATED COUNT: 12.8 % (ref 11.6–15.4)
FERRITIN SERPL-MCNC: 74 NG/ML (ref 30–400)
GLOBULIN SER CALC-MCNC: 2.6 G/DL (ref 1.5–4.5)
GLUCOSE SERPL-MCNC: 116 MG/DL (ref 70–99)
HBA1C MFR BLD: 5.6 % (ref 4.8–5.6)
HCT VFR BLD AUTO: 41.6 % (ref 37.5–51)
HCV AB SERPL QL IA: NORMAL
HCV IGG SERPL QL IA: NON REACTIVE
HDLC SERPL-MCNC: 55 MG/DL
HGB BLD-MCNC: 13.6 G/DL (ref 13–17.7)
IMM GRANULOCYTES # BLD AUTO: 0 X10E3/UL (ref 0–0.1)
IMM GRANULOCYTES NFR BLD AUTO: 0 %
IRON SATN MFR SERPL: 24 % (ref 15–55)
IRON SERPL-MCNC: 76 UG/DL (ref 38–169)
LDLC SERPL CALC-MCNC: 76 MG/DL (ref 0–99)
LYMPHOCYTES # BLD AUTO: 1.6 X10E3/UL (ref 0.7–3.1)
LYMPHOCYTES NFR BLD AUTO: 28 %
MAGNESIUM SERPL-MCNC: 2.2 MG/DL (ref 1.6–2.3)
MCH RBC QN AUTO: 32.1 PG (ref 26.6–33)
MCHC RBC AUTO-ENTMCNC: 32.7 G/DL (ref 31.5–35.7)
MCV RBC AUTO: 98 FL (ref 79–97)
MONOCYTES # BLD AUTO: 0.4 X10E3/UL (ref 0.1–0.9)
MONOCYTES NFR BLD AUTO: 8 %
NEUTROPHILS # BLD AUTO: 3 X10E3/UL (ref 1.4–7)
NEUTROPHILS NFR BLD AUTO: 54 %
NT-PROBNP SERPL-MCNC: 328 PG/ML (ref 0–486)
PLATELET # BLD AUTO: 186 X10E3/UL (ref 150–450)
POTASSIUM SERPL-SCNC: 3.5 MMOL/L (ref 3.5–5.2)
PROT SERPL-MCNC: 7 G/DL (ref 6–8.5)
RBC # BLD AUTO: 4.24 X10E6/UL (ref 4.14–5.8)
SODIUM SERPL-SCNC: 145 MMOL/L (ref 134–144)
T4 FREE SERPL-MCNC: 1.1 NG/DL (ref 0.82–1.77)
TIBC SERPL-MCNC: 314 UG/DL (ref 250–450)
TRIGL SERPL-MCNC: 76 MG/DL (ref 0–149)
TSH SERPL DL<=0.005 MIU/L-ACNC: 7.54 UIU/ML (ref 0.45–4.5)
UIBC SERPL-MCNC: 238 UG/DL (ref 111–343)
VIT B12 SERPL-MCNC: 324 PG/ML (ref 232–1245)
VLDLC SERPL CALC-MCNC: 15 MG/DL (ref 5–40)
WBC # BLD AUTO: 5.5 X10E3/UL (ref 3.4–10.8)

## 2024-12-08 NOTE — PROGRESS NOTES
The message below may be given by the hub:    Please contact patient with [facility] lab result message.    There is a lab result message attached to their lab results... but I received notification that the results were not viewed within 5 days on [facility].  I need to make sure that they get their lab result message.    Thank you.

## 2024-12-16 ENCOUNTER — OFFICE VISIT (OUTPATIENT)
Dept: FAMILY MEDICINE CLINIC | Facility: CLINIC | Age: 79
End: 2024-12-16
Payer: MEDICARE

## 2024-12-16 VITALS
DIASTOLIC BLOOD PRESSURE: 84 MMHG | HEIGHT: 66 IN | TEMPERATURE: 99.1 F | BODY MASS INDEX: 34.06 KG/M2 | HEART RATE: 83 BPM | SYSTOLIC BLOOD PRESSURE: 148 MMHG | OXYGEN SATURATION: 98 % | WEIGHT: 211.9 LBS

## 2024-12-16 DIAGNOSIS — N32.81 OAB (OVERACTIVE BLADDER): ICD-10-CM

## 2024-12-16 DIAGNOSIS — Z96.652 S/P TKR (TOTAL KNEE REPLACEMENT), LEFT: ICD-10-CM

## 2024-12-16 DIAGNOSIS — U07.1 COVID-19 VIRUS INFECTION: ICD-10-CM

## 2024-12-16 DIAGNOSIS — K21.9 GASTROESOPHAGEAL REFLUX DISEASE WITHOUT ESOPHAGITIS: ICD-10-CM

## 2024-12-16 DIAGNOSIS — I10 ESSENTIAL HYPERTENSION: ICD-10-CM

## 2024-12-16 DIAGNOSIS — Z79.01 CHRONIC ANTICOAGULATION: ICD-10-CM

## 2024-12-16 DIAGNOSIS — E78.2 MIXED HYPERLIPIDEMIA: ICD-10-CM

## 2024-12-16 DIAGNOSIS — E66.811 CLASS 1 OBESITY DUE TO EXCESS CALORIES WITH SERIOUS COMORBIDITY AND BODY MASS INDEX (BMI) OF 34.0 TO 34.9 IN ADULT: ICD-10-CM

## 2024-12-16 DIAGNOSIS — G47.33 OSA ON CPAP: ICD-10-CM

## 2024-12-16 DIAGNOSIS — I50.32 CHRONIC HEART FAILURE WITH PRESERVED EJECTION FRACTION (HFPEF): ICD-10-CM

## 2024-12-16 DIAGNOSIS — I48.19 ATRIAL FIBRILLATION, PERSISTENT: ICD-10-CM

## 2024-12-16 DIAGNOSIS — M17.0 PRIMARY OSTEOARTHRITIS OF BOTH KNEES: ICD-10-CM

## 2024-12-16 DIAGNOSIS — R73.9 HYPERGLYCEMIA: ICD-10-CM

## 2024-12-16 DIAGNOSIS — E66.09 CLASS 1 OBESITY DUE TO EXCESS CALORIES WITH SERIOUS COMORBIDITY AND BODY MASS INDEX (BMI) OF 34.0 TO 34.9 IN ADULT: ICD-10-CM

## 2024-12-16 DIAGNOSIS — Z85.46 HISTORY OF MALIGNANT NEOPLASM OF PROSTATE: ICD-10-CM

## 2024-12-16 DIAGNOSIS — J06.9 VIRAL URI WITH COUGH: Primary | ICD-10-CM

## 2024-12-16 PROBLEM — R06.02 SHORTNESS OF BREATH: Status: RESOLVED | Noted: 2022-06-13 | Resolved: 2024-12-16

## 2024-12-16 PROBLEM — M17.10 ARTHRITIS OF KNEE: Status: RESOLVED | Noted: 2024-08-06 | Resolved: 2024-12-16

## 2024-12-16 PROBLEM — R94.6 ABNORMAL THYROID FUNCTION TEST: Status: RESOLVED | Noted: 2024-08-21 | Resolved: 2024-12-16

## 2024-12-16 LAB
EXPIRATION DATE: ABNORMAL
FLUAV AG UPPER RESP QL IA.RAPID: NOT DETECTED
FLUBV AG UPPER RESP QL IA.RAPID: NOT DETECTED
INTERNAL CONTROL: ABNORMAL
Lab: ABNORMAL
SARS-COV-2 AG UPPER RESP QL IA.RAPID: DETECTED

## 2024-12-16 PROCEDURE — 1159F MED LIST DOCD IN RCRD: CPT | Performed by: FAMILY MEDICINE

## 2024-12-16 PROCEDURE — 3079F DIAST BP 80-89 MM HG: CPT | Performed by: FAMILY MEDICINE

## 2024-12-16 PROCEDURE — 1160F RVW MEDS BY RX/DR IN RCRD: CPT | Performed by: FAMILY MEDICINE

## 2024-12-16 PROCEDURE — 87428 SARSCOV & INF VIR A&B AG IA: CPT | Performed by: FAMILY MEDICINE

## 2024-12-16 PROCEDURE — 1126F AMNT PAIN NOTED NONE PRSNT: CPT | Performed by: FAMILY MEDICINE

## 2024-12-16 PROCEDURE — 99214 OFFICE O/P EST MOD 30 MIN: CPT | Performed by: FAMILY MEDICINE

## 2024-12-16 PROCEDURE — G2211 COMPLEX E/M VISIT ADD ON: HCPCS | Performed by: FAMILY MEDICINE

## 2024-12-16 PROCEDURE — 3077F SYST BP >= 140 MM HG: CPT | Performed by: FAMILY MEDICINE

## 2024-12-16 RX ORDER — DEXTROMETHORPHAN HYDROBROMIDE AND PROMETHAZINE HYDROCHLORIDE 15; 6.25 MG/5ML; MG/5ML
5 SYRUP ORAL 4 TIMES DAILY PRN
Qty: 240 ML | Refills: 5 | Status: SHIPPED | OUTPATIENT
Start: 2024-12-16

## 2024-12-16 RX ORDER — ATORVASTATIN CALCIUM 20 MG/1
20 TABLET, FILM COATED ORAL NIGHTLY
Qty: 90 TABLET | Refills: 2 | Status: SHIPPED | OUTPATIENT
Start: 2024-12-16

## 2024-12-16 RX ORDER — METOPROLOL TARTRATE 25 MG/1
25 TABLET, FILM COATED ORAL 2 TIMES DAILY
Qty: 180 TABLET | Refills: 2 | Status: SHIPPED | OUTPATIENT
Start: 2024-12-16

## 2024-12-16 RX ORDER — PANTOPRAZOLE SODIUM 40 MG/1
40 TABLET, DELAYED RELEASE ORAL DAILY
Qty: 90 TABLET | Refills: 2 | Status: SHIPPED | OUTPATIENT
Start: 2024-12-16

## 2024-12-16 RX ORDER — DOXAZOSIN 4 MG/1
4 TABLET ORAL NIGHTLY
Qty: 90 TABLET | Refills: 2 | Status: SHIPPED | OUTPATIENT
Start: 2024-12-16

## 2024-12-16 RX ORDER — SACUBITRIL AND VALSARTAN 24; 26 MG/1; MG/1
1 TABLET, FILM COATED ORAL 2 TIMES DAILY
Qty: 180 TABLET | Refills: 2 | Status: SHIPPED | OUTPATIENT
Start: 2024-12-16

## 2024-12-16 NOTE — ASSESSMENT & PLAN NOTE
Patient's (Body mass index is 34.2 kg/m².) indicates that they are obese (BMI >30) with health conditions that include obstructive sleep apnea, hypertension, coronary heart disease, dyslipidemias, GERD, and osteoarthritis . Weight is unchanged. BMI  is above average; BMI management plan is completed. We discussed portion control and increasing exercise.

## 2024-12-16 NOTE — ASSESSMENT & PLAN NOTE
Discussed treatment options and he is interested in treatment given symptom onset less than 24 hours ago.    Discussed risks with Paxlovid and his Xarelto and he would like treatment with Lagevrio    Discussed use, side effects, and expectations.    Phenergan DM also given for cough and congestion relief.

## 2024-12-16 NOTE — ASSESSMENT & PLAN NOTE
Cont entresto, asa, atorvastatin, metoprolol    Mild BP elevation today but also with covid infection    No med changes.  Recheck at followup in April

## 2024-12-16 NOTE — PROGRESS NOTES
"Chief Complaint  Congestion/URI (wife tested pos for covid), HTN, Hyperlipidemia, CHF, MAE, History of prostate cancer, GERD, Afib, OA, Hyperglycemia, OAB     Subjective    History of Present Illness:  Ángel Parikh is a 79 y.o. male who presents today for follow-up visit to re-establish care together    Started this weekend with cough/congestion and URI symptoms - worsened yesterday.  Wife tested pos Thurs but his testing has been neg at home but symptoms are worsening.  Would like to be tested again today.     History of HTN, Hyperlipidemia, CHF, Hyperglycemia, Afib, GERD, OA, MAE, OAB, and past history of prostate cancer.    He did have L TKA with Dr Hull and has ongoing followup after surgery with Ortho and is overall doing better but still working on ROM    He did have lab work done in advance of his appointment today for review 12/2/24:    CBC returned with improvement in his postop anemia with hemoglobin up to 13.6, platelets returned normal at 186.  Comprehensive metabolic panel returned with normal kidney function with creatinine 0.88 and GFR 88, glucose elevated at 116 in the prediabetes range along with A1c that returned normal at 5.6.  Liver enzymes returned normal.  Magnesium returned normal at 2.2.  BNP given history of heart failure returned improved at 328.  Hepatitis C screening negative.  Vitamin B12 returned low normal at 324.  Iron saturation returned normal with good ferritin at 74.  TSH was slightly above normal at 7.54 with normal free T4 at 1.10.  Lipid panel returned with good cholesterol control with mild LDL increased from last labs but still remains good at 76.            Objective   Vital Signs:   /84 (BP Location: Left arm, Patient Position: Sitting, Cuff Size: Large Adult)   Pulse 83   Temp 99.1 °F (37.3 °C) (Oral)   Ht 167.6 cm (66\")   Wt 96.1 kg (211 lb 14.4 oz)   SpO2 98%   BMI 34.20 kg/m²     Review of Systems   Constitutional:  Positive for chills and fatigue. " Negative for appetite change and fever.   HENT:  Positive for congestion. Negative for hearing loss.    Eyes:  Negative for blurred vision.   Respiratory:  Positive for cough. Negative for chest tightness and wheezing.    Cardiovascular:  Negative for chest pain.   Gastrointestinal:  Negative for abdominal pain.   Musculoskeletal:  Negative for gait problem.   Skin:  Negative for rash.   Psychiatric/Behavioral:  Negative for depressed mood.        Past History:  Medical History: has a past medical history of Anemia (11/22/2005), Arthritis, Cancer, CHF (congestive heart failure), Colonic polyp, Elevated PSA, Encounter for screening colonoscopy, Essential hypertension, Gastroesophageal reflux disease without esophagitis, H/O valvular heart disease, Hearing loss, Heart murmur, Hemorrhoid, Hiatal hernia, High risk medication use, History of cellulitis, History of tobacco use, Hypercholesterolemia, Hyperglycemia, Hyperlipidemia, Knee swelling, Morbid obesity, Overactive bladder, Psoriasis, Restless leg syndrome, Sleep apnea with use of continuous positive airway pressure (CPAP), Visual impairment, Vitamin B12 deficiency, Vitamin D deficiency, Wears glasses, and Wears partial dentures.   Surgical History: has a past surgical history that includes Colonoscopy (11/10/2015); cystoscopy and needle biopsy prostate; Prostatectomy (N/A, 10/03/2017); Cardiac catheterization (N/A, 01/08/2021); Colonoscopy (11/22/2005); Cardiac electrophysiology procedure (N/A, 1/31/2024); and Total knee arthroplasty (Left, 9/9/2024).   Family History: family history includes Cirrhosis in his brother; Heart attack in his father; Hypertension in his father and mother; Stroke in his mother.   Social History: reports that he quit smoking about 19 years ago. His smoking use included cigarettes, pipe, and cigars. He started smoking about 34 years ago. He has a 15 pack-year smoking history. He has been exposed to tobacco smoke. He has never used  smokeless tobacco. He reports that he does not currently use alcohol. He reports that he does not use drugs.      Current Outpatient Medications:     aspirin 81 MG EC tablet, Take 1 tablet by mouth Daily. Stopped for surgery, Disp: , Rfl:     atorvastatin (LIPITOR) 20 MG tablet, Take 1 tablet by mouth Every Night., Disp: 90 tablet, Rfl: 2    doxazosin (CARDURA) 4 MG tablet, Take 1 tablet by mouth Every Night., Disp: 90 tablet, Rfl: 2    Entresto 24-26 MG tablet, Take 1 tablet by mouth 2 (Two) Times a Day., Disp: 180 tablet, Rfl: 2    fluticasone (FLONASE) 50 MCG/ACT nasal spray, Administer 2 sprays into the nostril(s) as directed by provider Daily., Disp: , Rfl:     metoprolol tartrate (LOPRESSOR) 25 MG tablet, Take 1 tablet by mouth 2 (Two) Times a Day., Disp: 180 tablet, Rfl: 2    multivitamin with minerals (MULTIVITAMIN ADULT PO), Take 1 tablet by mouth Daily., Disp: , Rfl:     pantoprazole (PROTONIX) 40 MG EC tablet, Take 1 tablet by mouth Daily., Disp: 90 tablet, Rfl: 2    rivaroxaban (XARELTO) 20 MG tablet, Take 1 tablet by mouth Daily., Disp: , Rfl:     Vibegron (Gemtesa) 75 MG tablet, Take 1 tablet by mouth Daily., Disp: , Rfl:     Molnupiravir (LAGEVRIO) 200 MG capsule, Take 4 capsules by mouth Every 12 (Twelve) Hours for 5 days. Indications: COVID-19 Confirmed Infection, Disp: 40 capsule, Rfl: 0    promethazine-dextromethorphan (PROMETHAZINE-DM) 6.25-15 MG/5ML syrup, Take 5 mL by mouth 4 (Four) Times a Day As Needed for Cough., Disp: 240 mL, Rfl: 5    Allergies: Levofloxacin, Meloxicam, and Percocet [oxycodone-acetaminophen]    Physical Exam  Constitutional:       Appearance: Normal appearance.   HENT:      Head: Normocephalic.      Right Ear: External ear normal.      Left Ear: External ear normal.      Nose: Congestion and rhinorrhea present.   Eyes:      Pupils: Pupils are equal, round, and reactive to light.   Cardiovascular:      Rate and Rhythm: Normal rate and regular rhythm.      Heart sounds:  Normal heart sounds.   Pulmonary:      Effort: Pulmonary effort is normal.      Breath sounds: Normal breath sounds. No wheezing.   Musculoskeletal:      Cervical back: Normal range of motion.      Comments: L knee pain improving after sgy 9/2024   Skin:     General: Skin is warm and dry.   Neurological:      General: No focal deficit present.      Mental Status: He is alert and oriented to person, place, and time.   Psychiatric:         Mood and Affect: Mood normal.         Behavior: Behavior normal.         Thought Content: Thought content normal.          Result Review                   Assessment and Plan  Diagnoses and all orders for this visit:    1. Viral URI with cough (Primary)  Assessment & Plan:  See treatment below for COVID-19 infection    Orders:  -     POCT SARS-CoV-2 Antigen SLAVA + Flu    2. COVID-19 virus infection  Assessment & Plan:  Discussed treatment options and he is interested in treatment given symptom onset less than 24 hours ago.    Discussed risks with Paxlovid and his Xarelto and he would like treatment with Lagevrio    Discussed use, side effects, and expectations.    Phenergan DM also given for cough and congestion relief.    Orders:  -     Molnupiravir (LAGEVRIO) 200 MG capsule; Take 4 capsules by mouth Every 12 (Twelve) Hours for 5 days. Indications: COVID-19 Confirmed Infection  Dispense: 40 capsule; Refill: 0  -     promethazine-dextromethorphan (PROMETHAZINE-DM) 6.25-15 MG/5ML syrup; Take 5 mL by mouth 4 (Four) Times a Day As Needed for Cough.  Dispense: 240 mL; Refill: 5    3. Essential hypertension  Assessment & Plan:  Hypertension is stable and controlled  Continue current treatment regimen.  Weight loss.  Regular aerobic exercise.  Blood pressure will be reassessed in 6 months.    Orders:  -     doxazosin (CARDURA) 4 MG tablet; Take 1 tablet by mouth Every Night.  Dispense: 90 tablet; Refill: 2  -     Entresto 24-26 MG tablet; Take 1 tablet by mouth 2 (Two) Times a Day.   Dispense: 180 tablet; Refill: 2  -     metoprolol tartrate (LOPRESSOR) 25 MG tablet; Take 1 tablet by mouth 2 (Two) Times a Day.  Dispense: 180 tablet; Refill: 2    4. Mixed hyperlipidemia  Assessment & Plan:   Lipid abnormalities are improving with treatment    Plan:  Continue same medication/s without change.      Discussed medication dosage, use, side effects, and goals of treatment in detail.    Counseled patient on lifestyle modifications to help control hyperlipidemia.     Patient Treatment Goals:   LDL goal is under 100    Followup at the next regular appointment.    Orders:  -     atorvastatin (LIPITOR) 20 MG tablet; Take 1 tablet by mouth Every Night.  Dispense: 90 tablet; Refill: 2    5. Hyperglycemia  Assessment & Plan:  Encouraged diet/exercise efforts       6. Atrial fibrillation, persistent  Assessment & Plan:  Continue metoprolol along with Xarelto.  Doing well after ablation.  Ongoing cardiology follow-up    Orders:  -     Entresto 24-26 MG tablet; Take 1 tablet by mouth 2 (Two) Times a Day.  Dispense: 180 tablet; Refill: 2  -     metoprolol tartrate (LOPRESSOR) 25 MG tablet; Take 1 tablet by mouth 2 (Two) Times a Day.  Dispense: 180 tablet; Refill: 2    7. Chronic anticoagulation  Assessment & Plan:  See above       8. Chronic heart failure with preserved ejection fraction (HFpEF)  Assessment & Plan:  Cont entresto, asa, atorvastatin, metoprolol    Mild BP elevation today but also with covid infection    No med changes.  Recheck at followup in April 9. History of malignant neoplasm of prostate  Assessment & Plan:  Continue to follow-up with urology he does continue with surveillance PSA monitoring with his urologist.      10. OAB (overactive bladder)  Assessment & Plan:  Continue Gemtesa with ongoing follow-up with urology      11. Primary osteoarthritis of both knees  Assessment & Plan:  Doing well after L TKA Sept 2024      12. S/P TKR (total knee replacement), left    13. Gastroesophageal  reflux disease without esophagitis  Assessment & Plan:  Continue pantoprazole.  No dysphagia.  Refilled    Orders:  -     pantoprazole (PROTONIX) 40 MG EC tablet; Take 1 tablet by mouth Daily.  Dispense: 90 tablet; Refill: 2    14. MAE on CPAP  Assessment & Plan:  Continue CPAP      15. Class 1 obesity due to excess calories with serious comorbidity and body mass index (BMI) of 34.0 to 34.9 in adult  Assessment & Plan:  Patient's (Body mass index is 34.2 kg/m².) indicates that they are obese (BMI >30) with health conditions that include obstructive sleep apnea, hypertension, coronary heart disease, dyslipidemias, GERD, and osteoarthritis . Weight is unchanged. BMI  is above average; BMI management plan is completed. We discussed portion control and increasing exercise.                      Follow Up  Return in about 4 months (around 4/16/2025) for Med recheck.    Perez Pompa MD

## 2024-12-16 NOTE — ASSESSMENT & PLAN NOTE
Continue metoprolol along with Xarelto.  Doing well after ablation.  Ongoing cardiology follow-up   Dr. Vu, please see Dr. Riojas's, Nephrology's note below.     Pt is having Sleeve next wk Wed 2/15. Her Dialysis days are usually MWF.     Would you like me to text you Tuesday evening to rechk her potassium the morning of surgery just in case she has to have dialysis.     Please advise  Alpa Márquez

## 2025-02-03 ENCOUNTER — DOCUMENTATION (OUTPATIENT)
Dept: CARDIOLOGY | Facility: CLINIC | Age: 80
End: 2025-02-03
Payer: MEDICARE

## 2025-02-03 ENCOUNTER — OFFICE VISIT (OUTPATIENT)
Dept: CARDIOLOGY | Facility: CLINIC | Age: 80
End: 2025-02-03
Payer: MEDICARE

## 2025-02-03 VITALS
SYSTOLIC BLOOD PRESSURE: 132 MMHG | OXYGEN SATURATION: 96 % | HEIGHT: 66 IN | BODY MASS INDEX: 33.75 KG/M2 | WEIGHT: 210 LBS | DIASTOLIC BLOOD PRESSURE: 80 MMHG | HEART RATE: 54 BPM

## 2025-02-03 DIAGNOSIS — I50.32 CHRONIC HEART FAILURE WITH PRESERVED EJECTION FRACTION (HFPEF): ICD-10-CM

## 2025-02-03 DIAGNOSIS — Z79.01 CHRONIC ANTICOAGULATION: ICD-10-CM

## 2025-02-03 DIAGNOSIS — G47.33 OSA ON CPAP: ICD-10-CM

## 2025-02-03 DIAGNOSIS — I48.19 ATRIAL FIBRILLATION, PERSISTENT: Primary | ICD-10-CM

## 2025-02-03 PROCEDURE — G2211 COMPLEX E/M VISIT ADD ON: HCPCS | Performed by: INTERNAL MEDICINE

## 2025-02-03 PROCEDURE — 3079F DIAST BP 80-89 MM HG: CPT | Performed by: INTERNAL MEDICINE

## 2025-02-03 PROCEDURE — 99214 OFFICE O/P EST MOD 30 MIN: CPT | Performed by: INTERNAL MEDICINE

## 2025-02-03 PROCEDURE — 3075F SYST BP GE 130 - 139MM HG: CPT | Performed by: INTERNAL MEDICINE

## 2025-02-03 NOTE — RESEARCH
Informed consent worksheet for the protocol:  LUX-Dx Insertable Cardiac Monitor Sub-Study    Consent version  Date 09/27/2024  with the approval dated  Nov 06, 2024    Subject ID  [id number]      The patient was seen in the office today by Dr. Atwood  and identified as a candidate for the  LUX-Dx Insertable Cardiac Monitor Sub-Study .     The following people were present at the consent conference:   Patient: Ángel Parikh   : Chencho Zhang   Other:  patient's wife      The following was discussed with the patient prior to signing the informed consent.    The study purposes   Procedures   Duration of study participation  New findings   Risks   Discomforts  Any known side effects when applicable    The alternative treatments   Benefits   Patient and insurance costs   Payments if applicable     Patient's accountability and responsibilities    The voluntary nature of research participants     Right to withdraw consent    The withdrawal process    Confidentiality   Authorization to use and disclose information for research purposes - Including who can view information and the types of information shared.    The patient was informed of what to do in case of an illness or injury resulting from the study and who to contact for more trial information, or information on rights and welfare as a research volunteer. The patient was given the  contact Information and clinical trial contact information.     The patient was given opportunity for questions and opportunity to discuss this with family and friends. The  and or sub investigators were also available for any questions. The patient verbalizes understanding and is willing to sign the informed consent.     After all questions were satisfied the patient signed the informed consent and a copy of the signed main informed consent form & any sub-study informed consent forms were given to the patient.    No  study-specific procedures were completed prior to patient signing study informed consent form(s)    The  and or sub investigator is aware of the subject's participation status.

## 2025-02-03 NOTE — PROGRESS NOTES
Cardiac Electrophysiology Outpatient Follow Up Note            [facility] at [facility]    Follow Up Office Visit      Ángel Parikh  [id number]  02/03/2025  [unfilled]  [unfilled]    Primary Care Physician: Perez Pompa MD    Referred By: No ref. provider found    Subjective     Chief Complaint:   Diagnoses and all orders for this visit:    1. Atrial fibrillation, persistent (Primary)    2. Chronic anticoagulation    3. Chronic heart failure with preserved ejection fraction (HFpEF)    4. MAE on CPAP      Chief Complaint   Patient presents with    Atrial fibrillation, persistent       History of Present Illness:   Ángel Parikh is a 79 y.o. male who presents to my electrophysiology clinic for follow up of above.      Past Medical History:   Past Medical History:   Diagnosis Date    Anemia 11/22/2005    Arthritis     multi joint jose left knee     Cancer     prostate per elevated PSA and biopsies     CHF (congestive heart failure)     Colonic polyp     Elevated PSA     Encounter for screening colonoscopy     Essential hypertension     Gastroesophageal reflux disease without esophagitis     H/O valvular heart disease     3 leaky valves- monitored by FMD and Dr Scott (sees every 6 months)    Hearing loss     hearing aids in both ears    Heart murmur     Hemorrhoid     bleeds on occasion     Hiatal hernia     High risk medication use     History of cellulitis     History of tobacco use     QUIT AT AGE 51    Hypercholesterolemia     Hyperglycemia     Hyperlipidemia     Knee swelling     Morbid obesity     Overactive bladder     Psoriasis     spots on legs     Restless leg syndrome     Sleep apnea with use of continuous positive airway pressure (CPAP)     10 auto setting    Visual impairment     Vitamin B12 deficiency     Vitamin D deficiency     Wears glasses     Wears partial dentures        Past Surgical History:   Past Surgical History:   Procedure Laterality  Date    CARDIAC CATHETERIZATION N/A 2021    Procedure: Left Heart Cath 35311;  Surgeon: Reggie Scott MD;  Location:  [facility];  Service: Cardiovascular;  Laterality: N/A;    CARDIAC ELECTROPHYSIOLOGY PROCEDURE N/A 2024    Procedure: Ablation atrial fibrillation (PFA). DNS Xarelto. Hold Flecainide 3 days prior.;  Surgeon: Luis Atwood DO;  Location:  [facility];  Service: Cardiovascular;  Laterality: N/A;    COLONOSCOPY  11/10/2015    MOD SIGMOID DIVERTICULOSIS, NO POLYPS. PLAN: FIBER, REPEAT IN 10 YEARS.    COLONOSCOPY  2005    DIVERTICULOSIS, INT HEMERRHOIDS. NO POLYPS. EGD- REFLUX, NO BLEEDING. FOLLOW UP CAPSULE ENDOSCOPY AT  -NORMAL    CYSTOSCOPY AND NEEDLE BIOPSY PROSTATE      twice; last time 2017    PROSTATECTOMY N/A 10/03/2017    Procedure: PROSTATECTOMY LAPAROSCOPIC WITH DAVINCI ROBOT;  Surgeon: Jameel Ferguson Jr., MD;  Location:  [facility];  Service:     TOTAL KNEE ARTHROPLASTY Left 2024    Procedure: TOTAL KNEE ARTHROPLASTY WITH PREETI ROBOT LEFT;  Surgeon: Perez Hull MD;  Location:  [facility];  Service: Robotics - Ortho;  Laterality: Left;       Family History:   Family History   Problem Relation Age of Onset    Stroke Mother     Hypertension Mother     Heart attack Father     Hypertension Father     Cirrhosis Brother        Social History:   Social History     Socioeconomic History    Marital status:    Tobacco Use    Smoking status: Former     Current packs/day: 0.00     Average packs/day: 1 pack/day for 15.0 years (15.0 ttl pk-yrs)     Types: Cigarettes, Pipe, Cigars     Start date: 1990     Quit date: 2005     Years since quittin.1     Passive exposure: Past    Smokeless tobacco: Never    Tobacco comments:     smoked 3 cigars a day and pipe off and on    Vaping Use    Vaping status: Never Used    Passive vaping exposure: Yes   Substance and Sexual Activity    Alcohol use: Not Currently    Drug  "use: Never    Sexual activity: Not Currently     Partners: Female       Medications:     Current Outpatient Medications:     aspirin 81 MG EC tablet, Take 1 tablet by mouth Daily. Stopped for surgery, Disp: , Rfl:     atorvastatin (LIPITOR) 20 MG tablet, Take 1 tablet by mouth Every Night., Disp: 90 tablet, Rfl: 2    doxazosin (CARDURA) 4 MG tablet, Take 1 tablet by mouth Every Night., Disp: 90 tablet, Rfl: 2    Entresto 24-26 MG tablet, Take 1 tablet by mouth 2 (Two) Times a Day., Disp: 180 tablet, Rfl: 2    fluticasone (FLONASE) 50 MCG/ACT nasal spray, Administer 2 sprays into the nostril(s) as directed by provider Daily., Disp: , Rfl:     metoprolol tartrate (LOPRESSOR) 25 MG tablet, Take 1 tablet by mouth 2 (Two) Times a Day., Disp: 180 tablet, Rfl: 2    multivitamin with minerals (MULTIVITAMIN ADULT PO), Take 1 tablet by mouth Daily., Disp: , Rfl:     pantoprazole (PROTONIX) 40 MG EC tablet, Take 1 tablet by mouth Daily., Disp: 90 tablet, Rfl: 2    promethazine-dextromethorphan (PROMETHAZINE-DM) 6.25-15 MG/5ML syrup, Take 5 mL by mouth 4 (Four) Times a Day As Needed for Cough., Disp: 240 mL, Rfl: 5    rivaroxaban (XARELTO) 20 MG tablet, Take 1 tablet by mouth Daily., Disp: , Rfl:     Vibegron (Gemtesa) 75 MG tablet, Take 1 tablet by mouth Daily., Disp: , Rfl:     Allergies:   Allergies   Allergen Reactions    Levofloxacin Itching and Rash    Meloxicam Swelling    Percocet [Oxycodone-Acetaminophen] Rash     Chest and back red       Objective   Vital Signs:   Vitals:    02/03/25 1344   BP: 132/80   BP Location: Left arm   Patient Position: Sitting   Cuff Size: Adult   Pulse: 54   SpO2: 96%   Weight: 95.3 kg (210 lb)   Height: 167.6 cm (66\")       PHYSICAL EXAM  General appearance: Awake, alert, cooperative  Head: Normocephalic, without obvious abnormality, atraumatic  Eyes: Conjunctivae/corneas clear, EOMs intact  Neck: no adenopathy, no carotid bruit, no JVD, and thyroid: not enlarged  Lungs: clear to " "auscultation bilaterally and no rhonchi or crackles\", ' symmetric  Heart: regular rate and rhythm, S1, S2 normal, no murmur, click, rub or gallop  Abdomen: Soft, non-tender, bowel sounds normal,  no organomegaly  Extremities: extremities normal, atraumatic, no cyanosis or edema  Skin: Skin color, turgor normal, no rashes or lesions  Neurologic: Grossly normal     Lab Results   Component Value Date    GLUCOSE 116 (H) 12/02/2024    CALCIUM 9.4 12/02/2024     (H) 12/02/2024    K 3.5 12/02/2024    CO2 25 12/02/2024     12/02/2024    BUN 16 12/02/2024    CREATININE 0.88 12/02/2024    EGFRIFNONA 75 11/10/2021    BCR 18 12/02/2024    ANIONGAP 11.0 09/14/2024     Lab Results   Component Value Date    WBC 5.5 12/02/2024    HGB 13.6 12/02/2024    HCT 41.6 12/02/2024    MCV 98 (H) 12/02/2024     12/02/2024     Lab Results   Component Value Date    INR 1.84 (H) 08/26/2024    INR 0.98 09/29/2017    PROTIME 21.3 (H) 08/26/2024    PROTIME 10.7 09/29/2017     Lab Results   Component Value Date    TSH 7.540 (H) 12/02/2024    L3DYXSO 153 07/10/2024       Cardiac Testing:     I personally viewed and interpreted the patient's EKG/Telemetry/lab data    Procedures    Tobacco Cessation: N/A  Obstructive Sleep Apnea Screening: Completed    Advance Care Planning   ACP discussion was declined by the patient. Patient does not have an advance directive, declines further assistance.       Assessment & Plan    Diagnoses and all orders for this visit:    1. Atrial fibrillation, persistent (Primary)    2. Chronic anticoagulation    3. Chronic heart failure with preserved ejection fraction (HFpEF)    4. MAE on CPAP         Diagnosis Plan   1. Atrial fibrillation, persistent  Rhythm control strategy.  Catheter ablation January 2024.ADVANTAGE.  ILR in place.  No recurrence of A-fib since ablation.  Doing quite well.  Feels a whole lot better.  Lifelong anticoagulation for the primary prevention of stroke.      2. Chronic " anticoagulation  Rivaroxaban.  Now available as generic.  He will investigate the cost out-of-pocket for this.      3. Chronic heart failure with preserved ejection fraction (HFpEF)  Euvolemic.  Feels a whole lot better since restoration of sinus rhythm as outlined above.      4. MAE on CPAP  CPAP compliant.        Body mass index is 33.89 kg/m².    I spent 37 minutes in consultation with this patient which included more than 65% of this time in direct face-to-face counseling, physical examination and discussion of my assessment and findings and this shared decision making with the patient.  The remainder of the time not spent face-to-face was performing one, some or all of the following actions: preparing to see the patient (e.g. reviewing tests, prior clinicians' notes, etc), ordering medications, tests or procedures, coordination of care, discussion of the plan with other healthcare providers, documenting clinical information in epic as well as independently interpreting results and communication of these results to the patient family and/or caregiver(s).  Please note that this explicitly excludes time spent on other separate billable services such as performing procedures or test interpretation, when applicable.      Follow Up:       Thank you for allowing me to participate in the care of your patient. Please to not hesitate to contact me with additional questions or concerns.      Luis Atwood DO, FACC, RS  Cardiac Electrophysiologist  [facility] / [facility]

## 2025-02-05 ENCOUNTER — OFFICE VISIT (OUTPATIENT)
Dept: CARDIOLOGY | Facility: CLINIC | Age: 80
End: 2025-02-05
Payer: MEDICARE

## 2025-02-05 VITALS
RESPIRATION RATE: 18 BRPM | SYSTOLIC BLOOD PRESSURE: 144 MMHG | HEIGHT: 66 IN | WEIGHT: 211.8 LBS | OXYGEN SATURATION: 97 % | HEART RATE: 62 BPM | DIASTOLIC BLOOD PRESSURE: 82 MMHG | BODY MASS INDEX: 34.04 KG/M2

## 2025-02-05 DIAGNOSIS — I48.0 PAROXYSMAL ATRIAL FIBRILLATION: Primary | ICD-10-CM

## 2025-02-05 DIAGNOSIS — E78.2 MIXED HYPERLIPIDEMIA: ICD-10-CM

## 2025-02-05 DIAGNOSIS — Z95.818 STATUS POST PLACEMENT OF IMPLANTABLE LOOP RECORDER: ICD-10-CM

## 2025-02-05 DIAGNOSIS — I50.32 CHRONIC HEART FAILURE WITH PRESERVED EJECTION FRACTION (HFPEF): ICD-10-CM

## 2025-02-05 DIAGNOSIS — I10 ESSENTIAL HYPERTENSION: ICD-10-CM

## 2025-02-05 RX ORDER — EPLERENONE 25 MG/1
25 TABLET, FILM COATED ORAL DAILY
Qty: 30 TABLET | Refills: 5 | Status: SHIPPED | OUTPATIENT
Start: 2025-02-05

## 2025-02-05 NOTE — ASSESSMENT & PLAN NOTE
Seen prior ablation.  No clinical recurrence; significant recurrence on loop recorder check.  Some irregular heartbeats today on exam.  Blood pressure elevated and heart rate to goal.Plan:  Continue metoprolol 25 mg p.o. twice daily  Continue Xarelto 20 mg p.o. daily for anticoagulation  Add eplerenone 25 mg daily for hypokalemia  Continue Loop recorder checks for rhythm/rate control

## 2025-02-05 NOTE — PROGRESS NOTES
[facility]  [facility] CARDIOLOGY  [address] [place] [address]  Dept: [phone]  Dept Fax: [phone]    Date: 02/05/2025  Patient: Ángel Parikh  YOB: 1945    Follow Up Office Visit Note    Interval Follow-up  Mr. Ángel Parikh is a 79 y.o. male who is here for follow-up on Atrial Fibrillation.    Subjective   Patient doing well with no complaints.  Patient denies angina, orthopnea, PND, palpitations, lightheadedness, syncope or medications side-effects.    The following portions of the patient's history were reviewed and updated as appropriate: allergies, current medications, past family history, past medical history, past social history, past surgical history, and problem list.    Medications:   Allergies   Allergen Reactions    Levofloxacin Itching and Rash    Meloxicam Swelling    Percocet [Oxycodone-Acetaminophen] Rash     Chest and back red      Current Outpatient Medications   Medication Instructions    aspirin 81 mg, Oral, Daily, Stopped for surgery    atorvastatin (LIPITOR) 20 mg, Oral, Nightly    doxazosin (CARDURA) 4 mg, Oral, Nightly    eplerenone (INSPRA) 25 mg, Oral, Daily    fluticasone (FLONASE) 50 MCG/ACT nasal spray 2 sprays, Daily    metoprolol tartrate (LOPRESSOR) 25 mg, Oral, 2 Times Daily    multivitamin with minerals (MULTIVITAMIN ADULT PO) 1 tablet, Daily    pantoprazole (PROTONIX) 40 mg, Oral, Daily    promethazine-dextromethorphan (PROMETHAZINE-DM) 6.25-15 MG/5ML syrup 5 mL, Oral, 4 Times Daily PRN    rivaroxaban (XARELTO) 20 mg, Oral, Daily    sacubitril-valsartan (ENTRESTO) 24-26 MG tablet 1 tablet, Oral, 2 Times Daily    Vibegron (Gemtesa) 75 MG tablet 1 each, Daily       Tobacco Use: Medium Risk (2/5/2025)    Patient History     Smoking Tobacco Use: Former     Smokeless Tobacco Use: Never     Passive Exposure: Past        Objective  Vitals:    02/05/25 1425   BP: 144/82   Pulse: 62   Resp: 18   SpO2: 97%   Weight: 96.1 kg  "(211 lb 12.8 oz)   Height: 167.6 cm (66\")   PainSc: 0-No pain      Vitals:    02/05/25 1425   BP: 144/82   Pulse: 62   Resp: 18   SpO2: 97%   Weight: 96.1 kg (211 lb 12.8 oz)   Height: 167.6 cm (66\")          Physical Exam  Constitutional:       Appearance: Healthy appearance. Not in distress.   Eyes:      Pupils: Pupils are equal, round, and reactive to light.   Neck:      Vascular: No JVR. JVD normal.   Pulmonary:      Effort: Pulmonary effort is normal.      Breath sounds: Normal breath sounds. No wheezing. No rhonchi. No rales.   Chest:      Chest wall: Not tender to palpatation.   Cardiovascular:      PMI at left midclavicular line. Normal rate. Regular rhythm. Normal S1 with normal intensity. Normal S2 with normal intensity.       Murmurs: There is no murmur.      No gallop.  No click. No rub.      Comments: Trace bilateral lower extremity edema, with poor circulation  Pulses:     Intact distal pulses.   Edema:     Peripheral edema present.     Ankle: bilateral trace edema of the ankle.     Feet: bilateral trace edema of the feet.  Abdominal:      General: There is no abdominal bruit.   Skin:     General: Skin is warm.   Neurological:      Mental Status: Alert and oriented to person, place and time.              Diagnostic Data  Lab Results   Component Value Date     (H) 12/02/2024    K 3.5 12/02/2024     12/02/2024    CO2 25 12/02/2024    MG 2.2 12/02/2024    BUN 16 12/02/2024    CREATININE 0.88 12/02/2024    CALCIUM 9.4 12/02/2024    BILITOT 0.7 12/02/2024    ALKPHOS 93 12/02/2024    ALT 10 12/02/2024    AST 17 12/02/2024    GLUCOSE 116 (H) 12/02/2024    ALBUMIN 4.4 12/02/2024     Lab Results   Component Value Date    WBC 5.5 12/02/2024    HGB 13.6 12/02/2024    HCT 41.6 12/02/2024     12/02/2024     Lab Results   Component Value Date    INR 1.84 (H) 08/26/2024    INR 0.98 09/29/2017    PTT 37.5 08/26/2024    PTT 27.0 09/29/2017     Lab Results   Component Value Date    TROPONINT <0.010 " 10/26/2022    TROPONINT <0.010 06/17/2022     Lab Results   Component Value Date    PROBNP 328 12/02/2024       Lab Results   Component Value Date    CHLPL 146 12/02/2024    TRIG 76 12/02/2024    HDL 55 12/02/2024    LDL 76 12/02/2024     Lab Results   Component Value Date    TSH 7.540 (H) 12/02/2024    FREET4 1.10 12/02/2024       CV Diagnostics:  Procedures    CXR: Results for orders placed in visit on 09/29/17    XR Chest 2 View    Narrative  EXAMINATION: XR CHEST 2 VW-09/29/2017:    INDICATION: Preop.    COMPARISON: NONE.    FINDINGS: Cardiac size borderline enlarged without focal airspace  opacity or overt edema demonstrating minimal bibasilar atelectasis  greatest on the left. No pneumothorax or pleural effusion. No acute  osseous abnormality.    Impression  No acute cardiopulmonary findings.    D:  09/29/2017  E:  09/29/2017    This report was finalized on 9/29/2017 3:27 PM by Dr. Adriano Albert.     ECHO/MUGA: Results for orders placed during the hospital encounter of 01/26/24    Adult Transthoracic Echo Complete W/ Cont if Necessary Per Protocol    Interpretation Summary    Left ventricular systolic function is normal. Left ventricular ejection fraction appears to be 66 - 70%.    Left atrial volume is mildly increased.    Moderate mitral valve regurgitation is present.    Estimated right ventricular systolic pressure from tricuspid regurgitation is mildly elevated (35-45 mmHg).    Mild aortic valve regurgitation.     STRESS TESTS: No results found for this or any previous visit.     CARDIAC CATH: Results for orders placed during the hospital encounter of 01/08/21    Cardiac Catheterization/Vascular Study    Narrative  Cardiac Catheterization    Referring Provider: Perez Pompa MD    Indication(s) for this Procedure: Unstable angina pectoris abnormal stress test showing ischemia    Procedure(s) Performed: Left heart catheterization, coronary angiography, left ventriculography    Description of the  Procedure:  Informed consent was obtained.  A sheath was placed a left radial artery and selective angiography of the right left coronary arteries as well as left heart catheterization left ventriculography was performed.  Procedure is terminated and the sheath was removed with the TR band and there were no early complications.      Angiographic Findings:  Coronary dominance, right  · LM:   Normal  · LAD: Luminal irregularities  · LCX: Luminal irregularities  · RCA: Normal    LV: LVEF 60%      Hemodynamic Findings:  Ao pressure: 90/60 mmHg  LVEDP: 6 mmHg      EBL: None  Specimen(s): None obtained    Complications: There were no early complications.    Final Impression(s):    Near normal coronary arteries  Normal left ventricular systolic function  Noncardiac symptoms  Falsely abnormal stress test    Recommendations:    Evaluation of noncardiac cause of symptoms     DEVICES: No valid procedures specified.   HOLTER: No results found for this or any previous visit.     CT/MRI:  Results for orders placed during the hospital encounter of 01/26/24    CT Angiogram Chest    Narrative  CT ANGIOGRAM CHEST    Date of Exam: 1/26/2024 4:18 PM EST    Indication: PVA (PFA).    TECHNIQUE: CT angiogram chest with and without intravenous contrast. 2D reconstructions performed.    The radiation dose reduction device was turned on for each scan per the ALARA (As Low as Reasonably Achievable) protocol.    COMPARISON: None.    FINDINGS: Four-chamber cardiac enlargement without pericardial effusion. Patent, nonaneurysmal thoracic aorta.  Central pulmonary arteries are grossly patent. No anomalous pulmonary venous return. Left atrial appendage well-opacified without thrombus  burden. Central airways are patent. Esophagus courses posterior to the left atrium, with moderate size hiatal hernia present. Extended lung windows are grossly clear.    Impression  1. Cardiac enlargement without pericardial effusion.    2. No anomalous pulmonary  venous return.    3. Left atrial appendage well-opacified without significant thrombus burden.    4. Esophagus traverses the chest posterior to the left atrium just left of midline, with moderate size hiatal hernia present.      Electronically Signed: Terence Cordoba MD  1/29/2024 5:50 PM EST  Workstation ID: [id number]    VASCULAR: No valid procedures specified.     Assessment and Plan  Diagnoses and all orders for this visit:    1. Paroxysmal atrial fibrillation (Primary)  Assessment & Plan:  Seen prior ablation.  No clinical recurrence; significant recurrence on loop recorder check.  Some irregular heartbeats today on exam.  Blood pressure elevated and heart rate to goal.Plan:  Continue metoprolol 25 mg p.o. twice daily  Continue Xarelto 20 mg p.o. daily for anticoagulation  Add eplerenone 25 mg daily for hypokalemia  Continue Loop recorder checks for rhythm/rate control      2. Essential hypertension  Assessment & Plan:  Hypertension is uncontrolled  Medication changes per orders.  Dietary sodium restriction.  Weight loss.  Regular aerobic exercise.  Ambulatory blood pressure monitoring.  Allopurinol 25 mg p.o. daily and recheck blood pressure/labs in 3 weeks.  Blood pressure will be reassessedin 6 months.      3. Mixed hyperlipidemia  Assessment & Plan:   Lipid abnormalities are improving with treatment     Plan:  Continue same medication/s without change.  Atorvastatin 20 mg p.o. daily     Discussed medication dosage, use, side effects, and goals of treatment in detail.    Counseled patient on lifestyle modifications to help control hyperlipidemia.     Lab Results   Component Value Date    LDL 76 12/02/2024    LDL 52 08/07/2023    HDL 55 12/02/2024    HDL 44 08/07/2023    CHLPL 146 12/02/2024    CHLPL 118 08/07/2023    TRIG 76 12/02/2024    TRIG 122 08/07/2023       Goal of therapy: LDL <70 mg/dL; to target        Followup at the next regular appointment      4. Status post placement of implantable loop  recorder  Assessment & Plan:  [place].  Home loop recorder check revealed 18 episodes of paroxysmal atrial fibrillation in the last month.  Patient with potassium of 3.2 and 3.5 in the last 6 months.  Continue home checks.  Add eplerenone for hypokalemia and CHF.      5. Chronic heart failure with preserved ejection fraction (HFpEF)  Assessment & Plan:  Normal ejection fraction with moderate MR, mild AI, mild pulmonary hypertension.  NYHA IIa, class C.  No volume overload on exam.  On Entresto 24 to 26 mg twice daily, asymptomatic.  Blood pressure not to goal.  Last proBNP indeterminate range.  Potassium low.  Plan:  Add eplerenone 25 mg p.o. daily for hypokalemia CHF and blood pressure control  Consider adding SGLT-2 inhibitors next visit      Other orders  -     eplerenone (INSPRA) 25 MG tablet; Take 1 tablet by mouth Daily.  Dispense: 30 tablet; Refill: 5  -     sacubitril-valsartan (ENTRESTO) 24-26 MG tablet; Take 1 tablet by mouth 2 (Two) Times a Day.  Dispense: 180 tablet; Refill: 1         Return in about 6 months (around 8/5/2025) for Follow-up with Dr Almaguer.    There are no Patient Instructions on file for this visit.    Juan Almaguer MD

## 2025-02-05 NOTE — ASSESSMENT & PLAN NOTE
Hypertension is uncontrolled  Medication changes per orders.  Dietary sodium restriction.  Weight loss.  Regular aerobic exercise.  Ambulatory blood pressure monitoring.  Allopurinol 25 mg p.o. daily and recheck blood pressure/labs in 3 weeks.  Blood pressure will be reassessedin 6 months.

## 2025-02-05 NOTE — ASSESSMENT & PLAN NOTE
[place].  Home loop recorder check revealed 18 episodes of paroxysmal atrial fibrillation in the last month.  Patient with potassium of 3.2 and 3.5 in the last 6 months.  Continue home checks.  Add eplerenone for hypokalemia and CHF.

## 2025-02-05 NOTE — ASSESSMENT & PLAN NOTE
Lipid abnormalities are improving with treatment     Plan:  Continue same medication/s without change.  Atorvastatin 20 mg p.o. daily     Discussed medication dosage, use, side effects, and goals of treatment in detail.    Counseled patient on lifestyle modifications to help control hyperlipidemia.     Lab Results   Component Value Date    LDL 76 12/02/2024    LDL 52 08/07/2023    HDL 55 12/02/2024    HDL 44 08/07/2023    CHLPL 146 12/02/2024    CHLPL 118 08/07/2023    TRIG 76 12/02/2024    TRIG 122 08/07/2023       Goal of therapy: LDL <70 mg/dL; to target        Followup at the next regular appointment

## 2025-02-05 NOTE — ASSESSMENT & PLAN NOTE
Normal ejection fraction with moderate MR, mild AI, mild pulmonary hypertension.  NYHA IIa, class C.  No volume overload on exam.  On Entresto 24 to 26 mg twice daily, asymptomatic.  Blood pressure not to goal.  Last proBNP indeterminate range.  Potassium low.  Plan:  Add eplerenone 25 mg p.o. daily for hypokalemia CHF and blood pressure control  Consider adding SGLT-2 inhibitors next visit

## 2025-02-07 ENCOUNTER — TELEPHONE (OUTPATIENT)
Dept: CARDIOLOGY | Facility: CLINIC | Age: 80
End: 2025-02-07
Payer: MEDICARE

## 2025-02-07 NOTE — TELEPHONE ENCOUNTER
Per [employer] loop recorder transmission received today, 2/7/2025:     Two pause episodes noted yesterday, 2/7/2025 @ 12:01 PM. Episodes within the same time period.   Longest episode: 5.1 seconds  Second episode: 3.5 seconds    I spoke w/pt who reports he was at a hospital with a friend. Denies any symptomatology. Report in [employer] for review.

## 2025-02-17 ENCOUNTER — TELEPHONE (OUTPATIENT)
Dept: CARDIOLOGY | Facility: CLINIC | Age: 80
End: 2025-02-17
Payer: MEDICARE

## 2025-02-17 DIAGNOSIS — K21.9 GASTROESOPHAGEAL REFLUX DISEASE WITHOUT ESOPHAGITIS: ICD-10-CM

## 2025-02-17 RX ORDER — EPLERENONE 25 MG/1
25 TABLET, FILM COATED ORAL DAILY
Qty: 90 TABLET | Refills: 1 | Status: SHIPPED | OUTPATIENT
Start: 2025-02-17

## 2025-02-17 RX ORDER — EPLERENONE 25 MG/1
25 TABLET, FILM COATED ORAL DAILY
Qty: 90 TABLET | Refills: 1 | Status: SHIPPED | OUTPATIENT
Start: 2025-02-17 | End: 2025-02-17 | Stop reason: SDUPTHER

## 2025-02-17 RX ORDER — PANTOPRAZOLE SODIUM 40 MG/1
40 TABLET, DELAYED RELEASE ORAL DAILY
Qty: 90 TABLET | Refills: 3 | OUTPATIENT
Start: 2025-02-17

## 2025-02-17 NOTE — TELEPHONE ENCOUNTER
Caller: Ángel Parikh    Relationship: Self    Best call back number: [phone]    What is the best time to reach you: ANY    Who are you requesting to speak with (clinical staff, provider,  specific staff member): CLINICAL       What was the call regarding: PATIENT STATES THEY WERE ORDERED TO DO A BLOOD TEST AFTER TAKING A MEDICATION FOR POTASIUM DOES NOT KNOW THE NAME. [facility] HAS YET TO GET IT TO THEM AND THEY'D LIKE TO KNOW IF THEY STILL NEED TO GET THE TEST.     Is it okay if the provider responds through MyChart: PHONE CALL

## 2025-02-17 NOTE — TELEPHONE ENCOUNTER
Resent the medication eplerenone and told pt after he starts taking it . To  come have blood work 3-4 weeks . Had to send to [facility]

## 2025-02-26 ENCOUNTER — OFFICE VISIT (OUTPATIENT)
Dept: ENDOCRINOLOGY | Facility: CLINIC | Age: 80
End: 2025-02-26
Payer: MEDICARE

## 2025-02-26 VITALS
HEIGHT: 66 IN | OXYGEN SATURATION: 96 % | DIASTOLIC BLOOD PRESSURE: 82 MMHG | BODY MASS INDEX: 34.04 KG/M2 | HEART RATE: 71 BPM | SYSTOLIC BLOOD PRESSURE: 138 MMHG | WEIGHT: 211.8 LBS

## 2025-02-26 DIAGNOSIS — E03.9 HYPOTHYROIDISM, UNSPECIFIED TYPE: Primary | ICD-10-CM

## 2025-02-26 PROCEDURE — 99214 OFFICE O/P EST MOD 30 MIN: CPT | Performed by: PHYSICIAN ASSISTANT

## 2025-02-26 PROCEDURE — 3075F SYST BP GE 130 - 139MM HG: CPT | Performed by: PHYSICIAN ASSISTANT

## 2025-02-26 PROCEDURE — 36415 COLL VENOUS BLD VENIPUNCTURE: CPT | Performed by: PHYSICIAN ASSISTANT

## 2025-02-26 PROCEDURE — 3079F DIAST BP 80-89 MM HG: CPT | Performed by: PHYSICIAN ASSISTANT

## 2025-02-26 NOTE — PROGRESS NOTES
Chief Complaint   Patient presents with    Abnormal thyroid function test      HPI  Ángel Parikh is a 79 y.o. male presents today for evaluation of hypothyroidism. Referring They were last seen for this 8/21/2024.      Without concerns today.   Admits chronic fatigue that is unchanged. Difficulty swallowing improved.    Denies hoarseness. Denies changes in weight, weakness, heat/cold intolerance, chest pain, palpitations, tremor, abdominal pain, diarrhea, constipation, insomnia, anxiety/depression, vision changes, changes in hair/skin/nails.      Past medical history: Hypertension, hyperlipidemia, A-fib, MAE, obesity, GERD, hx prostate cancer s/p prostatectomy      Low TSH:  Initially told abnormal labs earlier 2024     Labs from  12/2/2024 TSH: 7.54, FT4: 1.10  7/10/2024 TSH: 5.06, FT4: 1.24, total T3: 153, TSI:<0.10.   1/10/2024 TSH: 0.006, T4: 10.6  6/1/2022 TSH: 4.680     - Denies hx amiodarone/digoxin use.   - Denies history of thyroid ultrasound, uptake scan, thyroidectomy.  - Unknown family history of thyroid disease or thyroid cancer.   - Denies history of radiation to head/neck  - Denies taking biotin supplement. Taking multivitamin.   - Denies high iodine diet.        The following portions of the patient's history were reviewed and updated as appropriate: allergies, current medications, past family history, past medical history, past social history, past surgical history and problem list.    Past Medical History:   Diagnosis Date    Anemia 11/22/2005    Arthritis     multi joint jose left knee     Cancer     prostate per elevated PSA and biopsies     CHF (congestive heart failure)     Colonic polyp     Elevated PSA     Encounter for screening colonoscopy     Essential hypertension     Gastroesophageal reflux disease without esophagitis     H/O valvular heart disease     3 leaky valves- monitored by FMD and Dr Scott (sees every 6 months)    Hearing loss     hearing aids in both ears    Heart murmur      Hemorrhoid     bleeds on occasion     Hiatal hernia     High risk medication use     History of cellulitis     History of tobacco use     QUIT AT AGE 51    Hypercholesterolemia     Hyperglycemia     Hyperlipidemia     Knee swelling     Morbid obesity     Overactive bladder     Psoriasis     spots on legs     Restless leg syndrome     Sleep apnea with use of continuous positive airway pressure (CPAP)     10 auto setting    Visual impairment     Vitamin B12 deficiency     Vitamin D deficiency     Wears glasses     Wears partial dentures      Past Surgical History:   Procedure Laterality Date    CARDIAC CATHETERIZATION N/A 01/08/2021    Procedure: Left Heart Cath 55080;  Surgeon: Reggie Scott MD;  Location:  [facility];  Service: Cardiovascular;  Laterality: N/A;    CARDIAC ELECTROPHYSIOLOGY PROCEDURE N/A 1/31/2024    Procedure: Ablation atrial fibrillation (PFA). DNS Xarelto. Hold Flecainide 3 days prior.;  Surgeon: Luis Atwood DO;  Location:  [facility];  Service: Cardiovascular;  Laterality: N/A;    COLONOSCOPY  11/10/2015    MOD SIGMOID DIVERTICULOSIS, NO POLYPS. PLAN: FIBER, REPEAT IN 10 YEARS.    COLONOSCOPY  11/22/2005    DIVERTICULOSIS, INT HEMERRHOIDS. NO POLYPS. EGD- REFLUX, NO BLEEDING. FOLLOW UP CAPSULE ENDOSCOPY AT  2012-NORMAL    CYSTOSCOPY AND NEEDLE BIOPSY PROSTATE      twice; last time 2017    PROSTATECTOMY N/A 10/03/2017    Procedure: PROSTATECTOMY LAPAROSCOPIC WITH DAVINCI ROBOT;  Surgeon: Jameel Ferguson Jr., MD;  Location:  [facility];  Service:     TOTAL KNEE ARTHROPLASTY Left 9/9/2024    Procedure: TOTAL KNEE ARTHROPLASTY WITH PREETI ROBOT LEFT;  Surgeon: Perez Hull MD;  Location:  [facility];  Service: Robotics - Ortho;  Laterality: Left;      Family History   Problem Relation Age of Onset    Stroke Mother     Hypertension Mother     Heart attack Father     Hypertension Father     Cirrhosis Brother       Social History     Socioeconomic  History    Marital status:    Tobacco Use    Smoking status: Former     Current packs/day: 0.00     Average packs/day: 1 pack/day for 15.0 years (15.0 ttl pk-yrs)     Types: Cigarettes, Pipe, Cigars     Start date: 1990     Quit date: 2005     Years since quittin.1     Passive exposure: Past    Smokeless tobacco: Never    Tobacco comments:     smoked 3 cigars a day and pipe off and on    Vaping Use    Vaping status: Never Used    Passive vaping exposure: Yes   Substance and Sexual Activity    Alcohol use: Not Currently    Drug use: Never    Sexual activity: Not Currently     Partners: Female      Allergies   Allergen Reactions    Levofloxacin Itching and Rash    Meloxicam Swelling    Percocet [Oxycodone-Acetaminophen] Rash     Chest and back red      Current Outpatient Medications on File Prior to Visit   Medication Sig Dispense Refill    aspirin 81 MG EC tablet Take 1 tablet by mouth Daily. Stopped for surgery      atorvastatin (LIPITOR) 20 MG tablet Take 1 tablet by mouth Every Night. 90 tablet 2    doxazosin (CARDURA) 4 MG tablet Take 1 tablet by mouth Every Night. 90 tablet 2    eplerenone (INSPRA) 25 MG tablet Take 1 tablet by mouth Daily. 90 tablet 1    fluticasone (FLONASE) 50 MCG/ACT nasal spray Administer 2 sprays into the nostril(s) as directed by provider Daily.      metoprolol tartrate (LOPRESSOR) 25 MG tablet Take 1 tablet by mouth 2 (Two) Times a Day. 180 tablet 2    multivitamin with minerals (MULTIVITAMIN ADULT PO) Take 1 tablet by mouth Daily.      pantoprazole (PROTONIX) 40 MG EC tablet Take 1 tablet by mouth Daily. 90 tablet 2    promethazine-dextromethorphan (PROMETHAZINE-DM) 6.25-15 MG/5ML syrup Take 5 mL by mouth 4 (Four) Times a Day As Needed for Cough. 240 mL 5    rivaroxaban (XARELTO) 20 MG tablet Take 1 tablet by mouth Daily.      sacubitril-valsartan (ENTRESTO) 24-26 MG tablet Take 1 tablet by mouth 2 (Two) Times a Day. 180 tablet 1    Vibegron (Gemtesa) 75 MG tablet  "Take 1 tablet by mouth Daily.       No current facility-administered medications on file prior to visit.        Review of Systems   Constitutional:  Positive for fatigue. Negative for activity change, appetite change, unexpected weight gain and unexpected weight loss.   HENT:  Negative for trouble swallowing and voice change.    Respiratory:  Negative for shortness of breath.    Cardiovascular:  Negative for chest pain and palpitations.   Gastrointestinal:  Negative for abdominal pain, constipation and diarrhea.   Endocrine: Negative for cold intolerance and heat intolerance.   Musculoskeletal:  Negative for myalgias.   Skin:  Negative for dry skin.   Neurological:  Negative for dizziness, tremors and numbness.   Psychiatric/Behavioral:  Negative for decreased concentration, depressed mood and stress. The patient is not nervous/anxious.         /82 (BP Location: Right arm, Patient Position: Sitting, Cuff Size: Adult)   Pulse 71   Ht 167.6 cm (66\")   Wt 96.1 kg (211 lb 12.8 oz)   SpO2 96%   BMI 34.19 kg/m²      Physical Exam  Constitutional:       Appearance: Normal appearance.   Neck:      Comments: Small < 1 cm nodularity lbilaterally  Cardiovascular:      Rate and Rhythm: Normal rate.   Pulmonary:      Effort: Pulmonary effort is normal.   Musculoskeletal:         General: Normal range of motion.   Skin:     General: Skin is warm and dry.   Neurological:      General: No focal deficit present.      Mental Status: He is alert and oriented to person, place, and time.   Psychiatric:         Mood and Affect: Mood normal.         Behavior: Behavior normal.         LABS AND IMAGING    CMP  Lab Results   Component Value Date    GLUCOSE 116 (H) 12/02/2024    BUN 16 12/02/2024    CREATININE 0.88 12/02/2024    EGFR 88 12/02/2024    BCR 18 12/02/2024    K 3.5 12/02/2024    CO2 25 12/02/2024    CALCIUM 9.4 12/02/2024    ALBUMIN 4.4 12/02/2024    AST 17 12/02/2024    ALT 10 12/02/2024        CBC w/DIFF   Lab Results " "  Component Value Date    WBC 5.5 12/02/2024    RBC 4.24 12/02/2024    HGB 13.6 12/02/2024    HCT 41.6 12/02/2024    MCV 98 (H) 12/02/2024    MCH 32.1 12/02/2024    MCHC 32.7 12/02/2024    RDW 12.8 12/02/2024    RDWSD 45.2 09/14/2024    MPV 9.9 09/14/2024     12/02/2024    NEUTRORELPCT 54 12/02/2024    LYMPHORELPCT 28 12/02/2024    MONORELPCT 8 12/02/2024    EOSRELPCT 9 12/02/2024    BASORELPCT 1 12/02/2024    AUTOIGPER 0.3 09/14/2024    NEUTROABS 3.0 12/02/2024    LYMPHSABS 1.6 12/02/2024    MONOSABS 0.4 12/02/2024    EOSABS 0.5 (H) 12/02/2024    BASOSABS 0.0 12/02/2024    AUTOIGNUM 0.02 09/14/2024    NRBC 0.0 09/14/2024       TSH  Lab Results   Component Value Date    TSH 7.540 (H) 12/02/2024    TSH 5.060 (H) 07/10/2024    TSH 4.680 (H) 06/01/2022       T4  Lab Results   Component Value Date    FREET4 1.10 12/02/2024    FREET4 1.24 07/10/2024     No results found for: \"T3NHGRN\"    T3  No results found for: \"T3FREE\"  Lab Results   Component Value Date    T6IYYPZ 153 07/10/2024       TRAb  No results found for: \"TSURCPAB\"    TPO  No results found for: \"THYROIDAB\"    No valid procedures specified.    Assessment and Plan    Diagnoses and all orders for this visit:    1. Hypothyroidism, unspecified type (Primary)  Assessment & Plan:  Low TSH with normal T4 on labs from 1/2024 with gradual transition to hypothyroidism over time.  Clinically euthyroid; hx a fib.     Mild nodularity bilaterally.  Discussed possible Hashimoto's thyroiditis, subacute thyroiditis.   Repeat TFT, TPO/Thyroglobulin ab.   Discussed thyroid ultrasound for further evaluation given nodularities; defers at this time    Discussed complications of uncontrolled thyroid disease including goiter, arrhythmia, CHF, weight loss/gain, confusion, changes in bowels.   Discussed treatment options as needed based on hormone levels and symptoms;  Can continue to monitor for now; advised starting levothyroxine if TSH >10.     Orders:  -     TSH  -     T4, " Free  -     Thyroid Antibodies         Return in about 4 months (around 6/26/2025) for follow-up thyroid disease. The patient was instructed to contact the clinic with any interval questions or concerns.    Electronically signed by: Jennifer Hernandez PA-C   Endocrinology    Please note that portions of this note were completed with a voice recognition program.

## 2025-02-26 NOTE — ASSESSMENT & PLAN NOTE
Low TSH with normal T4 on labs from 1/2024 with gradual transition to hypothyroidism over time.  Clinically euthyroid; hx a fib.     Mild nodularity bilaterally.  Discussed possible Hashimoto's thyroiditis, subacute thyroiditis.   Repeat TFT, TPO/Thyroglobulin ab.   Discussed thyroid ultrasound for further evaluation given nodularities; defers at this time    Discussed complications of uncontrolled thyroid disease including goiter, arrhythmia, CHF, weight loss/gain, confusion, changes in bowels.   Discussed treatment options as needed based on hormone levels and symptoms;  Can continue to monitor for now; advised starting levothyroxine if TSH >10.

## 2025-02-26 NOTE — ASSESSMENT & PLAN NOTE
Low TSH with normal T4 on labs from 1/2024 with gradual transition to hypothyroidism.   Clinically euthyroid; hx a fib.     Mild nodularity bilaterally.  Discussed possible thyroiditis, autoimmune thyroid disease    Repeat TFT, TPO/Thyroglobulin ab.   Discussed thyroid ultrasound for further evaluation given nodularities; defers at this time    Discussed complications of uncontrolled thyroid disease including goiter, arrhythmia, CHF, weight loss/gain, confusion, changes in bowels.   Discussed treatment options as needed based on hormone levels and symptoms.  Can continue to monitor for now; advised starting levothyroxine if TSH >10.

## 2025-02-27 LAB
THYROGLOB AB SERPL-ACNC: <1 IU/ML (ref 0–0.9)
THYROPEROXIDASE AB SERPL-ACNC: 11 IU/ML (ref 0–34)

## 2025-03-06 ENCOUNTER — TELEPHONE (OUTPATIENT)
Dept: ENDOCRINOLOGY | Facility: CLINIC | Age: 80
End: 2025-03-06
Payer: MEDICARE

## 2025-03-06 DIAGNOSIS — E03.9 HYPOTHYROIDISM, UNSPECIFIED TYPE: Primary | ICD-10-CM

## 2025-03-06 NOTE — TELEPHONE ENCOUNTER
Spoke with patient in regard to labs; thyroid antibodies normal not suggestive of autoimmune cause for abnormal labs. Discussed error with lab and thyroid hormones were not repeated. Does not want to have these repeated at the [facility] in [place] - will discuss with the on-site lab to see if he will be able to come in on a Wednesday to have these done.

## 2025-03-08 PROCEDURE — 93298 REM INTERROG DEV EVAL SCRMS: CPT | Performed by: INTERNAL MEDICINE

## 2025-03-18 DIAGNOSIS — E03.9 HYPOTHYROIDISM, UNSPECIFIED TYPE: Primary | ICD-10-CM

## 2025-03-19 NOTE — TELEPHONE ENCOUNTER
Spoke with patient regards to thyroid labs; unable to perform walk-in at clinic on Wednesdays.  Discussed alternatives through a [facility] versus [facility] versus [place].  He does not travel much and issues with billing with [facility] in the past.  Agreeable to have done at [facility].  Will mail orders and advised to contact office if he has not received by next week.  Advised to follow-up with office after completion as we often do not received results back without requesting from the hospital.  Patient agreeable to plan.

## 2025-03-21 ENCOUNTER — TELEPHONE (OUTPATIENT)
Dept: CARDIOLOGY | Facility: CLINIC | Age: 80
End: 2025-03-21
Payer: MEDICARE

## 2025-03-21 NOTE — TELEPHONE ENCOUNTER
Per [employer] loop recorder transmission received today, 3/21/2025:     Pause episode on 3/20/2025.  Duration: 3.5 seconds.  Report in [place].     I spoke w/pt who states he was awake & asymptomatic.

## 2025-04-16 ENCOUNTER — OFFICE VISIT (OUTPATIENT)
Dept: FAMILY MEDICINE CLINIC | Facility: CLINIC | Age: 80
End: 2025-04-16
Payer: MEDICARE

## 2025-04-16 VITALS
WEIGHT: 216.3 LBS | DIASTOLIC BLOOD PRESSURE: 72 MMHG | HEART RATE: 63 BPM | SYSTOLIC BLOOD PRESSURE: 130 MMHG | BODY MASS INDEX: 34.76 KG/M2 | HEIGHT: 66 IN | OXYGEN SATURATION: 100 %

## 2025-04-16 DIAGNOSIS — I48.19 ATRIAL FIBRILLATION, PERSISTENT: ICD-10-CM

## 2025-04-16 DIAGNOSIS — E87.6 HYPOKALEMIA: ICD-10-CM

## 2025-04-16 DIAGNOSIS — E78.2 MIXED HYPERLIPIDEMIA: ICD-10-CM

## 2025-04-16 DIAGNOSIS — E03.9 HYPOTHYROIDISM, UNSPECIFIED TYPE: ICD-10-CM

## 2025-04-16 DIAGNOSIS — R73.9 HYPERGLYCEMIA: ICD-10-CM

## 2025-04-16 DIAGNOSIS — I10 ESSENTIAL HYPERTENSION: Primary | ICD-10-CM

## 2025-04-16 DIAGNOSIS — K21.9 GASTROESOPHAGEAL REFLUX DISEASE WITHOUT ESOPHAGITIS: ICD-10-CM

## 2025-04-16 RX ORDER — ATORVASTATIN CALCIUM 20 MG/1
20 TABLET, FILM COATED ORAL NIGHTLY
Qty: 90 TABLET | Refills: 2 | Status: SHIPPED | OUTPATIENT
Start: 2025-04-16

## 2025-04-16 RX ORDER — PANTOPRAZOLE SODIUM 40 MG/1
40 TABLET, DELAYED RELEASE ORAL DAILY
Qty: 90 TABLET | Refills: 2 | Status: SHIPPED | OUTPATIENT
Start: 2025-04-16

## 2025-04-16 RX ORDER — DOXAZOSIN 4 MG/1
4 TABLET ORAL NIGHTLY
Qty: 90 TABLET | Refills: 2 | Status: SHIPPED | OUTPATIENT
Start: 2025-04-16

## 2025-04-16 RX ORDER — METOPROLOL TARTRATE 25 MG/1
25 TABLET, FILM COATED ORAL 2 TIMES DAILY
Qty: 180 TABLET | Refills: 2 | Status: SHIPPED | OUTPATIENT
Start: 2025-04-16

## 2025-04-16 RX ORDER — POTASSIUM CHLORIDE 1500 MG/1
20 TABLET, EXTENDED RELEASE ORAL DAILY
COMMUNITY

## 2025-04-16 NOTE — PROGRESS NOTES
"Chief Complaint  HTN, Hyperlipidemia, CHF, MAE, History of prostate cancer, GERD, Afib, OA, Hyperglycemia, OAB     Subjective    History of Present Illness:  Ángel Parikh is a 79 y.o. male who presents today for follow-up visit and medication refills    Doing well since our last visit together in December 2024 to re-establish care together.    History of hypothyroidism with repeat labs ordered by [facility] with TSH, FT4 and Thyroid antibodies - he would like them done here today    Started on potassium from Cardiology given low-normal with last labs - would like recheck on potassium with labs today     History of HTN, Hyperlipidemia, CHF, Hyperglycemia, Afib, GERD, OA, MAE, OAB, and past history of prostate cancer.    He did have L TKA with Dr Hull in Oct 2024 and has ongoing followup after surgery with Ortho and is overall doing better but still working on ROM    Fasting labwork uptodate 12/2/24.      A1c back to normal with better exercise after knee replacement sgy     Objective   Vital Signs:   /72 (BP Location: Left arm)   Pulse 63   Ht 167.6 cm (66\")   Wt 98.1 kg (216 lb 4.8 oz)   SpO2 100%   BMI 34.91 kg/m²     Review of Systems   Constitutional:  Positive for fatigue. Negative for chills, diaphoresis and fever.   HENT:  Negative for congestion, sore throat and swollen glands.    Respiratory:  Negative for cough.    Cardiovascular:  Negative for chest pain.   Gastrointestinal:  Negative for abdominal pain, nausea and vomiting.   Genitourinary:  Negative for dysuria.   Musculoskeletal:  Negative for myalgias and neck pain.        Knee pain improving after L TKA in Oct, ROM improving   Skin:  Negative for rash.   Neurological:  Positive for weakness. Negative for numbness.       Past History:  Medical History: has a past medical history of Anemia (11/22/2005), Arthritis, Cancer, CHF (congestive heart failure), Colonic polyp, Elevated PSA, Encounter for screening colonoscopy, Essential hypertension, " Gastroesophageal reflux disease without esophagitis, H/O valvular heart disease, Hearing loss, Heart murmur, Hemorrhoid, Hiatal hernia, High risk medication use, History of cellulitis, History of tobacco use, Hypercholesterolemia, Hyperglycemia, Hyperlipidemia, Knee swelling, Morbid obesity, Overactive bladder, Psoriasis, Restless leg syndrome, Sleep apnea with use of continuous positive airway pressure (CPAP), Visual impairment, Vitamin B12 deficiency, Vitamin D deficiency, Wears glasses, and Wears partial dentures.   Surgical History: has a past surgical history that includes Colonoscopy (11/10/2015); cystoscopy and needle biopsy prostate; Prostatectomy (N/A, 10/03/2017); Cardiac catheterization (N/A, 01/08/2021); Colonoscopy (11/22/2005); Cardiac electrophysiology procedure (N/A, 1/31/2024); and Total knee arthroplasty (Left, 9/9/2024).   Family History: family history includes Cirrhosis in his brother; Heart attack in his father; Hypertension in his father and mother; Stroke in his mother.   Social History: reports that he quit smoking about 20 years ago. His smoking use included cigarettes, pipe, and cigars. He started smoking about 35 years ago. He has a 15 pack-year smoking history. He has been exposed to tobacco smoke. He has never used smokeless tobacco. He reports that he does not currently use alcohol. He reports that he does not use drugs.      Current Outpatient Medications:     aspirin 81 MG EC tablet, Take 1 tablet by mouth Daily. Stopped for surgery, Disp: , Rfl:     atorvastatin (LIPITOR) 20 MG tablet, Take 1 tablet by mouth Every Night., Disp: 90 tablet, Rfl: 2    doxazosin (CARDURA) 4 MG tablet, Take 1 tablet by mouth Every Night., Disp: 90 tablet, Rfl: 2    eplerenone (INSPRA) 25 MG tablet, Take 1 tablet by mouth Daily., Disp: 90 tablet, Rfl: 1    fluticasone (FLONASE) 50 MCG/ACT nasal spray, Administer 2 sprays into the nostril(s) as directed by provider Daily., Disp: , Rfl:     metoprolol  tartrate (LOPRESSOR) 25 MG tablet, Take 1 tablet by mouth 2 (Two) Times a Day., Disp: 180 tablet, Rfl: 2    multivitamin with minerals (MULTIVITAMIN ADULT PO), Take 1 tablet by mouth Daily., Disp: , Rfl:     pantoprazole (PROTONIX) 40 MG EC tablet, Take 1 tablet by mouth Daily., Disp: 90 tablet, Rfl: 2    potassium chloride (KLOR-CON M20) 20 MEQ CR tablet, Take 1 tablet by mouth Daily., Disp: , Rfl:     rivaroxaban (XARELTO) 20 MG tablet, Take 1 tablet by mouth Daily., Disp: , Rfl:     sacubitril-valsartan (ENTRESTO) 24-26 MG tablet, Take 1 tablet by mouth 2 (Two) Times a Day., Disp: 180 tablet, Rfl: 1    Vibegron (Gemtesa) 75 MG tablet, Take 1 tablet by mouth Daily., Disp: , Rfl:     Allergies: Levofloxacin, Meloxicam, and Percocet [oxycodone-acetaminophen]    Physical Exam  Constitutional:       Appearance: He is obese.   HENT:      Head: Normocephalic.      Right Ear: External ear normal.      Left Ear: External ear normal.      Nose: Nose normal.   Eyes:      Pupils: Pupils are equal, round, and reactive to light.   Cardiovascular:      Rate and Rhythm: Normal rate and regular rhythm.      Heart sounds: Murmur heard.      Comments: Soft murmur  Pulmonary:      Effort: Pulmonary effort is normal.      Breath sounds: Normal breath sounds.   Musculoskeletal:      Comments: Gait stability improving after L TKA 10/2024.    Skin:     General: Skin is warm and dry.   Neurological:      General: No focal deficit present.      Mental Status: He is alert.   Psychiatric:         Mood and Affect: Mood normal.         Behavior: Behavior normal.         Thought Content: Thought content normal.          Result Review                   Assessment and Plan  Diagnoses and all orders for this visit:    1. Essential hypertension (Primary)  Assessment & Plan:  Hypertension is stable and controlled  Continue current treatment regimen.  Weight loss.  Regular aerobic exercise.  Blood pressure will be reassessed  at next regular  followup visit  .    Orders:  -     Basic metabolic panel; Future  -     doxazosin (CARDURA) 4 MG tablet; Take 1 tablet by mouth Every Night.  Dispense: 90 tablet; Refill: 2  -     metoprolol tartrate (LOPRESSOR) 25 MG tablet; Take 1 tablet by mouth 2 (Two) Times a Day.  Dispense: 180 tablet; Refill: 2  -     Basic metabolic panel    2. Hyperglycemia  Assessment & Plan:  Encouraged diet/exercise efforts     Orders:  -     Hemoglobin A1c; Future  -     Hemoglobin A1c    3. Hypothyroidism, unspecified type  Assessment & Plan:  Reviewed Endo consult    Would like thyroid labs redone today - will check TSH, Ft4 and thyroid antibodies ordered by endo     Orders:  -     TSH; Future  -     T4, Free; Future  -     Thyroid Antibodies; Future  -     Thyroid Antibodies  -     T4, Free  -     TSH    4. Hypokalemia  Assessment & Plan:  On potassium from Cardiology    Will recheck bmp and mg today     Orders:  -     Basic metabolic panel; Future  -     Magnesium; Future  -     Magnesium  -     Basic metabolic panel    5. Mixed hyperlipidemia  Assessment & Plan:   Lipid abnormalities are improving with treatment    Plan:  Continue same medication/s without change.      Discussed medication dosage, use, side effects, and goals of treatment in detail.    Counseled patient on lifestyle modifications to help control hyperlipidemia.     Patient Treatment Goals:   LDL goal is under 100    Followup at the next regular appointment.    Orders:  -     atorvastatin (LIPITOR) 20 MG tablet; Take 1 tablet by mouth Every Night.  Dispense: 90 tablet; Refill: 2    6. Atrial fibrillation, persistent  Assessment & Plan:  Continue metoprolol along with Xarelto.  Doing well after ablation.  Ongoing cardiology follow-up    Sinus rhythm by auscultation today     Orders:  -     metoprolol tartrate (LOPRESSOR) 25 MG tablet; Take 1 tablet by mouth 2 (Two) Times a Day.  Dispense: 180 tablet; Refill: 2    7. Gastroesophageal reflux disease without  esophagitis  Assessment & Plan:  Continue pantoprazole.  No dysphagia.  Refilled    Orders:  -     pantoprazole (PROTONIX) 40 MG EC tablet; Take 1 tablet by mouth Daily.  Dispense: 90 tablet; Refill: 2                   Follow Up  Return in about 4 months (around 8/16/2025) for Medicare Wellness.    Perez Pompa MD

## 2025-04-16 NOTE — ASSESSMENT & PLAN NOTE
Hypertension is stable and controlled  Continue current treatment regimen.  Weight loss.  Regular aerobic exercise.  Blood pressure will be reassessed  at next regular followup visit  .

## 2025-04-16 NOTE — ASSESSMENT & PLAN NOTE
Continue metoprolol along with Xarelto.  Doing well after ablation.  Ongoing cardiology follow-up    Sinus rhythm by auscultation today

## 2025-04-16 NOTE — ASSESSMENT & PLAN NOTE
Reviewed Endo consult    Would like thyroid labs redone today - will check TSH, Ft4 and thyroid antibodies ordered by endo

## 2025-04-17 LAB
BUN SERPL-MCNC: 18 MG/DL (ref 8–27)
BUN/CREAT SERPL: 20 (ref 10–24)
CALCIUM SERPL-MCNC: 9.2 MG/DL (ref 8.6–10.2)
CHLORIDE SERPL-SCNC: 105 MMOL/L (ref 96–106)
CO2 SERPL-SCNC: 22 MMOL/L (ref 20–29)
CREAT SERPL-MCNC: 0.9 MG/DL (ref 0.76–1.27)
EGFRCR SERPLBLD CKD-EPI 2021: 87 ML/MIN/1.73
GLUCOSE SERPL-MCNC: 88 MG/DL (ref 70–99)
HBA1C MFR BLD: 5.5 % (ref 4.8–5.6)
MAGNESIUM SERPL-MCNC: 2.3 MG/DL (ref 1.6–2.3)
POTASSIUM SERPL-SCNC: 4.1 MMOL/L (ref 3.5–5.2)
SODIUM SERPL-SCNC: 142 MMOL/L (ref 134–144)
T4 FREE SERPL-MCNC: 1.13 NG/DL (ref 0.82–1.77)
THYROGLOB AB SERPL-ACNC: 1.3 IU/ML (ref 0–0.9)
THYROPEROXIDASE AB SERPL-ACNC: 20 IU/ML (ref 0–34)
TSH SERPL DL<=0.005 MIU/L-ACNC: 6.43 UIU/ML (ref 0.45–4.5)

## 2025-04-28 ENCOUNTER — TELEPHONE (OUTPATIENT)
Dept: CARDIOLOGY | Facility: CLINIC | Age: 80
End: 2025-04-28
Payer: MEDICARE

## 2025-04-30 NOTE — TELEPHONE ENCOUNTER
A different provider prescribed medication. Pt will have to reach out to provider to get refills

## 2025-05-02 ENCOUNTER — TELEPHONE (OUTPATIENT)
Dept: CARDIOLOGY | Facility: CLINIC | Age: 80
End: 2025-05-02
Payer: MEDICARE

## 2025-05-07 ENCOUNTER — OFFICE VISIT (OUTPATIENT)
Dept: FAMILY MEDICINE CLINIC | Facility: CLINIC | Age: 80
End: 2025-05-07
Payer: MEDICARE

## 2025-05-07 VITALS
BODY MASS INDEX: 33.89 KG/M2 | SYSTOLIC BLOOD PRESSURE: 126 MMHG | HEIGHT: 66 IN | HEART RATE: 62 BPM | DIASTOLIC BLOOD PRESSURE: 70 MMHG | OXYGEN SATURATION: 95 % | WEIGHT: 210.9 LBS

## 2025-05-07 DIAGNOSIS — J20.8 ACUTE BRONCHITIS DUE TO OTHER SPECIFIED ORGANISMS: Primary | ICD-10-CM

## 2025-05-07 RX ORDER — FLUTICASONE PROPIONATE 50 MCG
2 SPRAY, SUSPENSION (ML) NASAL DAILY
Qty: 16 G | Refills: 0 | Status: SHIPPED | OUTPATIENT
Start: 2025-05-07

## 2025-05-07 RX ORDER — DOXYCYCLINE 100 MG/1
100 CAPSULE ORAL 2 TIMES DAILY
Qty: 20 CAPSULE | Refills: 0 | Status: SHIPPED | OUTPATIENT
Start: 2025-05-07

## 2025-05-07 RX ORDER — TRIAMCINOLONE ACETONIDE 40 MG/ML
80 INJECTION, SUSPENSION INTRA-ARTICULAR; INTRAMUSCULAR ONCE
Status: COMPLETED | OUTPATIENT
Start: 2025-05-07 | End: 2025-05-07

## 2025-05-07 RX ADMIN — TRIAMCINOLONE ACETONIDE 80 MG: 40 INJECTION, SUSPENSION INTRA-ARTICULAR; INTRAMUSCULAR at 16:01

## 2025-05-08 NOTE — PROGRESS NOTES
"Chief Complaint  Congestion (Bad cough and congestion 3 weeks)    Subjective        Ángel Parikh presents to [facility]  History of Present Illness    History of Present Illness  79-year-old male presents with a persistent cough, congestion, and fatigue for 3 weeks. No fever or household illness reported. Cough is intermittent, does not disrupt sleep, and accompanied by wheezing. Fluid intake includes 12 cups of coffee and two bottles of Gatorade daily. Self-medicating with OTC cold remedies and occasional use of wife's albuterol inhaler, providing inconsistent relief.       Objective   Vital Signs:  /70 (BP Location: Right arm, Patient Position: Sitting, Cuff Size: Large Adult)   Pulse 62   Ht 167.6 cm (65.98\")   Wt 95.7 kg (210 lb 14.4 oz)   SpO2 95%   BMI 34.06 kg/m²   Estimated body mass index is 34.06 kg/m² as calculated from the following:    Height as of this encounter: 167.6 cm (65.98\").    Weight as of this encounter: 95.7 kg (210 lb 14.4 oz).            Physical Exam  Vitals and nursing note reviewed.   Constitutional:       General: He is not in acute distress.     Appearance: Normal appearance.   HENT:      Head: Normocephalic.      Right Ear: Hearing normal.      Left Ear: Hearing normal.   Eyes:      Pupils: Pupils are equal, round, and reactive to light.   Cardiovascular:      Rate and Rhythm: Normal rate and regular rhythm.      Heart sounds: Normal heart sounds.   Pulmonary:      Effort: Pulmonary effort is normal. No respiratory distress.      Breath sounds: Wheezing and rhonchi present. No rales.   Skin:     General: Skin is warm and dry.   Neurological:      Mental Status: He is alert.   Psychiatric:         Mood and Affect: Mood normal.        Result Review :                Assessment and Plan   Diagnoses and all orders for this visit:    1. Acute bronchitis due to other specified organisms (Primary)  -     fluticasone (FLONASE) 50 MCG/ACT nasal spray; " Administer 2 sprays into the nostril(s) as directed by provider Daily.  Dispense: 16 g; Refill: 0  -     doxycycline (VIBRAMYCIN) 100 MG capsule; Take 1 capsule by mouth 2 (Two) Times a Day.  Dispense: 20 capsule; Refill: 0  -     triamcinolone acetonide (KENALOG-40) injection 80 mg             Assessment & Plan  1. Bronchitis.    - Prescribed doxycycline and flonase.  Kenalog injection provided this date   - Advised 2 puffs of albuterol inhaler every 4-6 hours PRN, or use neb machine TID patient declines prescription, states he will use his wife's medication  increase fluid intake, reduce coffee consumption. If symptoms worsen or fail to improve, seek ER care due to pneumonia risk.          Follow Up   Return if symptoms worsen or fail to improve.  Patient was given instructions and counseling regarding his condition or for health maintenance advice. Please see specific information pulled into the AVS if appropriate.     Patient or patient representative verbalized consent for the use of Ambient Listening during the visit with  Cookie Givens PA-C for chart documentation. 5/8/2025  08:41 EDT

## 2025-05-19 ENCOUNTER — TELEPHONE (OUTPATIENT)
Dept: CARDIOLOGY | Facility: CLINIC | Age: 80
End: 2025-05-19
Payer: MEDICARE

## 2025-05-20 ENCOUNTER — TELEPHONE (OUTPATIENT)
Dept: CARDIOLOGY | Facility: CLINIC | Age: 80
End: 2025-05-20
Payer: MEDICARE

## 2025-05-20 NOTE — TELEPHONE ENCOUNTER
[phone] - PT WANTED TO PROVIDE THIS NUMBER FOR ANOTHER CONTACT WITH PHARMACY    PT CALLED AGAIN FOR REFILL OF XARELTO     DOESN'T SHOW AS SENT TO PHARMACY

## 2025-05-27 ENCOUNTER — TELEPHONE (OUTPATIENT)
Dept: CARDIOLOGY | Facility: CLINIC | Age: 80
End: 2025-05-27
Payer: MEDICARE

## 2025-05-27 NOTE — TELEPHONE ENCOUNTER
Caller: Ángel Parikh    Relationship: Self    Best call back number: [phone]      Which medication are you concerned about: XARELTO  20 MG DAILY    Who prescribed you this medication: DR. ROJAS    When did you start taking this medication:     What are your concerns: PATIENT HAS REQUESTED THIS MEDICATIONS SEVERAL TIMES TO BE SENT TO [facility]. [facility] SAY THEY STILL DON'T HAVE A PRESCRIPTION FOR THIS MEDICATION.  NOTES ABOUT THIS MEDICATION  REQUESTS START ON 5-19-25.   PATIENT HAS A NUMBER THAT PHYSICIANS NEED TO CALL IN SCRIPT FOR PATIENT: [phone]   FAX FOR [facility]: [phone].    PATIENT HAS BEEN OUT FOR A WEEK.    How long have you had these concerns: SINCE 5-19-25

## 2025-05-31 DIAGNOSIS — J20.8 ACUTE BRONCHITIS DUE TO OTHER SPECIFIED ORGANISMS: ICD-10-CM

## 2025-05-31 NOTE — TELEPHONE ENCOUNTER
90 DAY RX REQUEST    Requested Prescriptions:   Requested Prescriptions     Pending Prescriptions Disp Refills    fluticasone (FLONASE) 50 MCG/ACT nasal spray 16 g 0     Sig: Administer 2 sprays into the nostril(s) as directed by provider Daily.        Pharmacy where request should be sent: [facility] - [address] [phone] [facility] [phone] FX     Last office visit with prescribing clinician: 4/16/2025   Last telemedicine visit with prescribing clinician: Visit date not found   Next office visit with prescribing clinician: 8/18/2025       Dominga Bello Rep   05/31/25 09:28 EDT

## 2025-06-02 RX ORDER — FLUTICASONE PROPIONATE 50 MCG
2 SPRAY, SUSPENSION (ML) NASAL DAILY
Qty: 16 G | Refills: 0 | OUTPATIENT
Start: 2025-06-02

## 2025-06-04 DIAGNOSIS — J20.8 ACUTE BRONCHITIS DUE TO OTHER SPECIFIED ORGANISMS: ICD-10-CM

## 2025-06-04 RX ORDER — FLUTICASONE PROPIONATE 50 MCG
2 SPRAY, SUSPENSION (ML) NASAL DAILY
Qty: 48 G | Refills: 0 | Status: SHIPPED | OUTPATIENT
Start: 2025-06-04

## 2025-06-04 NOTE — TELEPHONE ENCOUNTER
Requested Prescriptions:   Requested Prescriptions     Pending Prescriptions Disp Refills    fluticasone (FLONASE) 50 MCG/ACT nasal spray 16 g 0     Sig: Administer 2 sprays into the nostril(s) as directed by provider Daily.        Pharmacy where request should be sent: [facility] - [address] [phone] [facility] [phone] FX     Last office visit with prescribing clinician: 4/16/2025   Last telemedicine visit with prescribing clinician: Visit date not found   Next office visit with prescribing clinician: 8/18/2025       Dominga Bello Rep   06/04/25 08:57 EDT

## 2025-06-14 PROCEDURE — 93298 REM INTERROG DEV EVAL SCRMS: CPT | Performed by: INTERNAL MEDICINE

## 2025-06-16 ENCOUNTER — TELEPHONE (OUTPATIENT)
Dept: CARDIOLOGY | Facility: CLINIC | Age: 80
End: 2025-06-16
Payer: MEDICARE

## 2025-06-16 NOTE — TELEPHONE ENCOUNTER
Remote monitor readings from loop recorder is showing AF since 6/12/25.  Called Mr Parikh and he is unaware of his AF.  He is compliant with his Xarelto.  Denies any symptoms.

## 2025-06-18 NOTE — TELEPHONE ENCOUNTER
Per [employer] remote transmission from loop recorder:     Ongoing Afib in presenting EGM.  AF burden 16% since 5/14/2025.   V-rates: 68-84 bpm.        No

## 2025-06-22 LAB
MDC_IDC_MSMT_BATTERY_STATUS: NORMAL
MDC_IDC_PG_IMPLANT_DTM: NORMAL
MDC_IDC_PG_MFG: NORMAL
MDC_IDC_PG_MODEL: NORMAL
MDC_IDC_PG_SERIAL: NORMAL
MDC_IDC_PG_TYPE: NORMAL
MDC_IDC_SESS_DTM: NORMAL
MDC_IDC_SESS_TYPE: NORMAL
MDC_IDC_STAT_AT_BURDEN_PERCENT: 83

## 2025-06-23 LAB
MDC_IDC_MSMT_BATTERY_STATUS: NORMAL
MDC_IDC_PG_IMPLANT_DTM: NORMAL
MDC_IDC_PG_MFG: NORMAL
MDC_IDC_PG_MODEL: NORMAL
MDC_IDC_PG_SERIAL: NORMAL
MDC_IDC_PG_TYPE: NORMAL
MDC_IDC_SESS_DTM: NORMAL
MDC_IDC_SESS_TYPE: NORMAL
MDC_IDC_STAT_AT_BURDEN_PERCENT: 14
MDC_IDC_STAT_AT_BURDEN_PERCENT: 16
MDC_IDC_STAT_AT_BURDEN_PERCENT: 98

## 2025-07-18 LAB
MDC_IDC_MSMT_BATTERY_STATUS: NORMAL
MDC_IDC_MSMT_BATTERY_STATUS: NORMAL
MDC_IDC_PG_IMPLANT_DTM: NORMAL
MDC_IDC_PG_IMPLANT_DTM: NORMAL
MDC_IDC_PG_MFG: NORMAL
MDC_IDC_PG_MFG: NORMAL
MDC_IDC_PG_MODEL: NORMAL
MDC_IDC_PG_MODEL: NORMAL
MDC_IDC_PG_SERIAL: NORMAL
MDC_IDC_PG_SERIAL: NORMAL
MDC_IDC_PG_TYPE: NORMAL
MDC_IDC_PG_TYPE: NORMAL
MDC_IDC_SESS_DTM: NORMAL
MDC_IDC_SESS_DTM: NORMAL
MDC_IDC_SESS_TYPE: NORMAL
MDC_IDC_SESS_TYPE: NORMAL
MDC_IDC_STAT_AT_BURDEN_PERCENT: 13
MDC_IDC_STAT_AT_BURDEN_PERCENT: 83

## 2025-07-24 LAB
MDC_IDC_MSMT_BATTERY_STATUS: NORMAL
MDC_IDC_PG_IMPLANT_DTM: NORMAL
MDC_IDC_PG_MFG: NORMAL
MDC_IDC_PG_MODEL: NORMAL
MDC_IDC_PG_SERIAL: NORMAL
MDC_IDC_PG_TYPE: NORMAL
MDC_IDC_SESS_DTM: NORMAL
MDC_IDC_SESS_TYPE: NORMAL
MDC_IDC_STAT_AT_BURDEN_PERCENT: 1

## 2025-07-25 ENCOUNTER — TELEPHONE (OUTPATIENT)
Dept: CARDIOLOGY | Facility: CLINIC | Age: 80
End: 2025-07-25
Payer: MEDICARE

## 2025-07-25 NOTE — TELEPHONE ENCOUNTER
Per [employer] remote loop recorder transmission received today, 7/25/2025.     Pause episode noted on 7/24/2025 @ 4:19 PM.   Duration: 3 seconds.     Called pt to assess symptomatology & to check if he was sleeping at the time of the episode. No answer, left voice mail message to call the device clinic, call back phone number provided.

## 2025-08-06 ENCOUNTER — OFFICE VISIT (OUTPATIENT)
Dept: CARDIOLOGY | Facility: CLINIC | Age: 80
End: 2025-08-06
Payer: MEDICARE

## 2025-08-06 VITALS
BODY MASS INDEX: 34.58 KG/M2 | HEART RATE: 76 BPM | SYSTOLIC BLOOD PRESSURE: 136 MMHG | DIASTOLIC BLOOD PRESSURE: 78 MMHG | HEIGHT: 66 IN | WEIGHT: 215.2 LBS | OXYGEN SATURATION: 97 %

## 2025-08-06 DIAGNOSIS — I10 ESSENTIAL HYPERTENSION: ICD-10-CM

## 2025-08-06 DIAGNOSIS — Z95.818 STATUS POST PLACEMENT OF IMPLANTABLE LOOP RECORDER: ICD-10-CM

## 2025-08-06 DIAGNOSIS — I48.0 PAROXYSMAL ATRIAL FIBRILLATION: Primary | ICD-10-CM

## 2025-08-06 DIAGNOSIS — E78.2 MIXED HYPERLIPIDEMIA: ICD-10-CM

## 2025-08-06 DIAGNOSIS — I50.32 CHRONIC HEART FAILURE WITH PRESERVED EJECTION FRACTION (HFPEF): ICD-10-CM

## 2025-08-06 RX ORDER — SACUBITRIL AND VALSARTAN 49; 51 MG/1; MG/1
1 TABLET, FILM COATED ORAL 2 TIMES DAILY
Qty: 180 TABLET | Refills: 2 | Status: SHIPPED | OUTPATIENT
Start: 2025-08-06

## 2025-08-07 ENCOUNTER — TELEPHONE (OUTPATIENT)
Dept: CARDIOLOGY | Facility: CLINIC | Age: 80
End: 2025-08-07
Payer: MEDICARE

## 2025-08-07 DIAGNOSIS — I48.19 ATRIAL FIBRILLATION, PERSISTENT: Primary | ICD-10-CM

## 2025-08-11 ENCOUNTER — TELEPHONE (OUTPATIENT)
Dept: CARDIOLOGY | Facility: CLINIC | Age: 80
End: 2025-08-11
Payer: MEDICARE

## 2025-08-12 RX ORDER — EPLERENONE 25 MG/1
25 TABLET ORAL DAILY
Qty: 90 TABLET | Refills: 1 | Status: SHIPPED | OUTPATIENT
Start: 2025-08-12

## 2025-08-18 ENCOUNTER — OFFICE VISIT (OUTPATIENT)
Dept: FAMILY MEDICINE CLINIC | Facility: CLINIC | Age: 80
End: 2025-08-18
Payer: MEDICARE

## 2025-08-18 VITALS
OXYGEN SATURATION: 95 % | DIASTOLIC BLOOD PRESSURE: 84 MMHG | HEIGHT: 65 IN | SYSTOLIC BLOOD PRESSURE: 122 MMHG | WEIGHT: 215.7 LBS | BODY MASS INDEX: 35.94 KG/M2 | HEART RATE: 66 BPM

## 2025-08-18 DIAGNOSIS — Z85.46 HISTORY OF MALIGNANT NEOPLASM OF PROSTATE: Chronic | ICD-10-CM

## 2025-08-18 DIAGNOSIS — G47.33 OSA ON CPAP: ICD-10-CM

## 2025-08-18 DIAGNOSIS — J30.2 SEASONAL ALLERGIES: ICD-10-CM

## 2025-08-18 DIAGNOSIS — E66.01 CLASS 2 SEVERE OBESITY DUE TO EXCESS CALORIES WITH SERIOUS COMORBIDITY AND BODY MASS INDEX (BMI) OF 35.0 TO 35.9 IN ADULT: ICD-10-CM

## 2025-08-18 DIAGNOSIS — R73.9 HYPERGLYCEMIA: ICD-10-CM

## 2025-08-18 DIAGNOSIS — E66.812 CLASS 2 SEVERE OBESITY DUE TO EXCESS CALORIES WITH SERIOUS COMORBIDITY AND BODY MASS INDEX (BMI) OF 35.0 TO 35.9 IN ADULT: ICD-10-CM

## 2025-08-18 DIAGNOSIS — I50.32 CHRONIC HEART FAILURE WITH PRESERVED EJECTION FRACTION (HFPEF): ICD-10-CM

## 2025-08-18 DIAGNOSIS — K21.9 GASTROESOPHAGEAL REFLUX DISEASE WITHOUT ESOPHAGITIS: ICD-10-CM

## 2025-08-18 DIAGNOSIS — E78.2 MIXED HYPERLIPIDEMIA: ICD-10-CM

## 2025-08-18 DIAGNOSIS — I48.19 ATRIAL FIBRILLATION, PERSISTENT: Chronic | ICD-10-CM

## 2025-08-18 DIAGNOSIS — Z00.00 GENERAL MEDICAL EXAM: Primary | ICD-10-CM

## 2025-08-18 DIAGNOSIS — E05.90 HYPERTHYROIDISM: ICD-10-CM

## 2025-08-18 DIAGNOSIS — N32.81 OAB (OVERACTIVE BLADDER): ICD-10-CM

## 2025-08-18 DIAGNOSIS — I10 ESSENTIAL HYPERTENSION: ICD-10-CM

## 2025-08-18 PROBLEM — E87.6 HYPOKALEMIA: Chronic | Status: ACTIVE | Noted: 2025-04-16

## 2025-08-18 PROBLEM — U07.1 COVID-19 VIRUS INFECTION: Status: RESOLVED | Noted: 2024-12-16 | Resolved: 2025-08-18

## 2025-08-18 PROBLEM — D50.9 IRON DEFICIENCY ANEMIA: Chronic | Status: ACTIVE | Noted: 2022-06-13

## 2025-08-18 PROBLEM — C61 MALIGNANT NEOPLASM OF PROSTATE: Status: ACTIVE | Noted: 2023-09-21

## 2025-08-18 PROBLEM — C61 MALIGNANT NEOPLASM OF PROSTATE: Status: RESOLVED | Noted: 2023-09-21 | Resolved: 2025-08-18

## 2025-08-18 PROBLEM — E78.5 HLD (HYPERLIPIDEMIA): Chronic | Status: ACTIVE | Noted: 2017-10-03

## 2025-08-18 PROCEDURE — 1126F AMNT PAIN NOTED NONE PRSNT: CPT | Performed by: FAMILY MEDICINE

## 2025-08-18 PROCEDURE — 99214 OFFICE O/P EST MOD 30 MIN: CPT | Performed by: FAMILY MEDICINE

## 2025-08-18 PROCEDURE — 3074F SYST BP LT 130 MM HG: CPT | Performed by: FAMILY MEDICINE

## 2025-08-18 PROCEDURE — 1160F RVW MEDS BY RX/DR IN RCRD: CPT | Performed by: FAMILY MEDICINE

## 2025-08-18 PROCEDURE — 3079F DIAST BP 80-89 MM HG: CPT | Performed by: FAMILY MEDICINE

## 2025-08-18 PROCEDURE — 1170F FXNL STATUS ASSESSED: CPT | Performed by: FAMILY MEDICINE

## 2025-08-18 PROCEDURE — 1159F MED LIST DOCD IN RCRD: CPT | Performed by: FAMILY MEDICINE

## 2025-08-18 PROCEDURE — G2211 COMPLEX E/M VISIT ADD ON: HCPCS | Performed by: FAMILY MEDICINE

## 2025-08-18 PROCEDURE — G0439 PPPS, SUBSEQ VISIT: HCPCS | Performed by: FAMILY MEDICINE

## 2025-08-18 RX ORDER — ATORVASTATIN CALCIUM 20 MG/1
20 TABLET, FILM COATED ORAL NIGHTLY
Qty: 90 TABLET | Refills: 2 | Status: SHIPPED | OUTPATIENT
Start: 2025-08-18

## 2025-08-18 RX ORDER — PANTOPRAZOLE SODIUM 40 MG/1
40 TABLET, DELAYED RELEASE ORAL DAILY
Qty: 90 TABLET | Refills: 2 | Status: SHIPPED | OUTPATIENT
Start: 2025-08-18

## 2025-08-18 RX ORDER — DOXAZOSIN 4 MG/1
4 TABLET ORAL NIGHTLY
Qty: 90 TABLET | Refills: 2 | Status: SHIPPED | OUTPATIENT
Start: 2025-08-18

## 2025-08-18 RX ORDER — FLUTICASONE PROPIONATE 50 MCG
2 SPRAY, SUSPENSION (ML) NASAL DAILY
Qty: 48 G | Refills: 2 | Status: SHIPPED | OUTPATIENT
Start: 2025-08-18

## 2025-08-19 LAB
ALBUMIN SERPL-MCNC: 4.4 G/DL (ref 3.8–4.8)
ALP SERPL-CCNC: 89 IU/L (ref 44–121)
ALT SERPL-CCNC: 13 IU/L (ref 0–44)
AST SERPL-CCNC: 20 IU/L (ref 0–40)
BASOPHILS # BLD AUTO: 0 X10E3/UL (ref 0–0.2)
BASOPHILS NFR BLD AUTO: 1 %
BILIRUB SERPL-MCNC: 0.5 MG/DL (ref 0–1.2)
BUN SERPL-MCNC: 19 MG/DL (ref 8–27)
BUN/CREAT SERPL: 19 (ref 10–24)
CALCIUM SERPL-MCNC: 9.2 MG/DL (ref 8.6–10.2)
CHLORIDE SERPL-SCNC: 105 MMOL/L (ref 96–106)
CO2 SERPL-SCNC: 22 MMOL/L (ref 20–29)
CREAT SERPL-MCNC: 1.01 MG/DL (ref 0.76–1.27)
EGFRCR SERPLBLD CKD-EPI 2021: 76 ML/MIN/1.73
EOSINOPHIL # BLD AUTO: 0.3 X10E3/UL (ref 0–0.4)
EOSINOPHIL NFR BLD AUTO: 5 %
ERYTHROCYTE [DISTWIDTH] IN BLOOD BY AUTOMATED COUNT: 12.9 % (ref 11.6–15.4)
GLOBULIN SER CALC-MCNC: 2.6 G/DL (ref 1.5–4.5)
GLUCOSE SERPL-MCNC: 94 MG/DL (ref 70–99)
HBA1C MFR BLD: 5.4 % (ref 4.8–5.6)
HCT VFR BLD AUTO: 40.8 % (ref 37.5–51)
HGB BLD-MCNC: 13.8 G/DL (ref 13–17.7)
IMM GRANULOCYTES # BLD AUTO: 0 X10E3/UL (ref 0–0.1)
IMM GRANULOCYTES NFR BLD AUTO: 0 %
LYMPHOCYTES # BLD AUTO: 1.1 X10E3/UL (ref 0.7–3.1)
LYMPHOCYTES NFR BLD AUTO: 17 %
MCH RBC QN AUTO: 33.2 PG (ref 26.6–33)
MCHC RBC AUTO-ENTMCNC: 33.8 G/DL (ref 31.5–35.7)
MCV RBC AUTO: 98 FL (ref 79–97)
MONOCYTES # BLD AUTO: 0.5 X10E3/UL (ref 0.1–0.9)
MONOCYTES NFR BLD AUTO: 8 %
NEUTROPHILS # BLD AUTO: 4.4 X10E3/UL (ref 1.4–7)
NEUTROPHILS NFR BLD AUTO: 69 %
NT-PROBNP SERPL-MCNC: 166 PG/ML (ref 0–486)
PLATELET # BLD AUTO: 189 X10E3/UL (ref 150–450)
POTASSIUM SERPL-SCNC: 3.8 MMOL/L (ref 3.5–5.2)
PROT SERPL-MCNC: 7 G/DL (ref 6–8.5)
RBC # BLD AUTO: 4.16 X10E6/UL (ref 4.14–5.8)
SODIUM SERPL-SCNC: 143 MMOL/L (ref 134–144)
T4 FREE SERPL-MCNC: 1.13 NG/DL (ref 0.82–1.77)
TSH SERPL DL<=0.005 MIU/L-ACNC: 5.12 UIU/ML (ref 0.45–4.5)
WBC # BLD AUTO: 6.4 X10E3/UL (ref 3.4–10.8)

## 2025-08-20 ENCOUNTER — TELEPHONE (OUTPATIENT)
Dept: CARDIOLOGY | Facility: CLINIC | Age: 80
End: 2025-08-20
Payer: MEDICARE

## (undated) DEVICE — SUT MNCRYL UROLOG UR6 2/0 27IN UNDYED

## (undated) DEVICE — KT MANIFLD EP

## (undated) DEVICE — INTRO SHEATH PRELUDE IDEAL SPRNG COIL 021 6F 23X80CM

## (undated) DEVICE — ANTIBACTERIAL UNDYED BRAIDED (POLYGLACTIN 910), SYNTHETIC ABSORBABLE SUTURE: Brand: COATED VICRYL

## (undated) DEVICE — SUT VIC 0 TIES 18IN J912G

## (undated) DEVICE — SOL PVPI 10PCT 0.75OZ PEEL/PCH/PACKET 1P/U STRL

## (undated) DEVICE — ST EXT IV SMARTSITE PINCH/CLMP 5ML 46CM

## (undated) DEVICE — BNDG ELAS W/CLIP 6IN 10YD LF STRL

## (undated) DEVICE — DRSNG SURG AQUACEL AG/ADVNTGE 9X25CM 3.5X10IN

## (undated) DEVICE — ST EXT IV SMRTSTE 2VLV FIX M LL 6ML 41

## (undated) DEVICE — LEX ELECTRO PHYSIOLOGY: Brand: MEDLINE INDUSTRIES, INC.

## (undated) DEVICE — NEEDLE, QUINCKE 22GX3.5": Brand: MEDLINE INDUSTRIES, INC.

## (undated) DEVICE — SUT PDS MONO 0 36 CT1 VIL PDP346H

## (undated) DEVICE — GLV SURG SENSICARE W/ALOE PF LF 7 STRL

## (undated) DEVICE — TRAP FLD MINIVAC MEGADYNE 100ML

## (undated) DEVICE — KT PROC KN MAKO VIZADISC TRACK 1P/U STRL

## (undated) DEVICE — DECANT BG O JET

## (undated) DEVICE — CATH DIAG EXPO M/ PK 5F FL4/FR4 PIG

## (undated) DEVICE — GW TRNSEP SAFESEPT LT/ATRIAL RO 135CM .014IN

## (undated) DEVICE — HIGH RESOLUTION MAPPING CATHETER: Brand: INTELLAMAP ORION™

## (undated) DEVICE — ENDOPATH XCEL BLADELESS TROCARS WITH STABILITY SLEEVES: Brand: ENDOPATH XCEL

## (undated) DEVICE — CATH DIAG EXPO .045 FL3  5F 100CM

## (undated) DEVICE — 3 BONE CEMENT MIXER: Brand: MIXEVAC

## (undated) DEVICE — ST INF PRI SMRTSTE 20DRP 2VLV 24ML 117

## (undated) DEVICE — GLV SURG SENSICARE W/ALOE PF LF 7.5 STRL

## (undated) DEVICE — SUT VIC 1 CTX 36IN OBGYN VCP977H

## (undated) DEVICE — ST TBG AIRSEAL FLTR TRI LUM

## (undated) DEVICE — ENDOPOUCH RETRIEVER SPECIMEN RETRIEVAL BAGS: Brand: ENDOPOUCH RETRIEVER

## (undated) DEVICE — SYR LUERLOK 30CC

## (undated) DEVICE — CATHETER,URETHRAL,REDRUBBER,STERILE,22FR: Brand: MEDLINE

## (undated) DEVICE — CANNULA,ADULT,SOFT-TOUCH,7TUBE,SC: Brand: MEDLINE

## (undated) DEVICE — LEX PROSTATE ACCESSORY PACK: Brand: MEDLINE INDUSTRIES, INC.

## (undated) DEVICE — SUT MONOCRYL PLS ANTIB UND 3/0  PS1 27IN

## (undated) DEVICE — Device: Brand: WEBSTER CS

## (undated) DEVICE — TROC ANCHORPORT BLADELES W/GRIP 12X120MM

## (undated) DEVICE — SYS TRNSEP ACC BRK ACROSS A/ 18G 98CM

## (undated) DEVICE — SYS CLS VASC/VENI VASCADE MVP 6TO12F

## (undated) DEVICE — BLANKT WARM UPPR/BDY ARM/OUT 57X196CM

## (undated) DEVICE — Device

## (undated) DEVICE — DRESSING,GAUZE,XEROFORM,CURAD,1"X8",ST: Brand: CURAD

## (undated) DEVICE — KT TRAK CHECKPOINT 1FEM/1TIB MAKO SS 1P/U STRL

## (undated) DEVICE — CATH ULTRASND ECHOCARDIOGRAPHY ACUNAV

## (undated) DEVICE — PK KN TOTL 10

## (undated) DEVICE — INTRO SHEATH FAST/CATH LG/LUM 11F .038IN 12CM

## (undated) DEVICE — TIP COVER ACCESSORY

## (undated) DEVICE — PK UROL DAVINCI 10

## (undated) DEVICE — ADULT, W/LG. BACK PAD, RADIOTRANSPARENT ELEMENT AND LEAD WIRE COMPATIBLE W/: Brand: DEFIBRILLATION ELECTRODES

## (undated) DEVICE — SOL NACL 0.9PCT 1000ML

## (undated) DEVICE — INTRO STEER AGILIS NXT MED/CURL 8.5F

## (undated) DEVICE — MODEL BT2000 P/N 700287-012KIT CONTENTS: MANIFOLD WITH SALINE AND CONTRAST PORTS, SALINE TUBING WITH SPIKE AND HAND SYRINGE, TRANSDUCER: Brand: BT2000 AUTOMATED MANIFOLD KIT

## (undated) DEVICE — PAD,ARMBOARD,CONV,FOAM,2X8X20",12PR/CS: Brand: MEDLINE

## (undated) DEVICE — UNDERCAST PADDING: Brand: DEROYAL

## (undated) DEVICE — TAPE,CLOTH/SILK,CURAD,3"X10YD,LF,40/CS: Brand: CURAD

## (undated) DEVICE — LEGGINGS, PAIR, 29X43, STERILE: Brand: MEDLINE

## (undated) DEVICE — AIRWY SZ11

## (undated) DEVICE — DOME MONITORING W BONDED STPCK BIOTRANS2

## (undated) DEVICE — SUT SILK 2/0 PS 18IN 1588H

## (undated) DEVICE — DRSNG SURESITE123 4X4.8IN

## (undated) DEVICE — MEDI-VAC YANKAUER SUCTION HANDLE W/BULBOUS TIP: Brand: CARDINAL HEALTH

## (undated) DEVICE — GLV SURG SENSICARE PI MIC PF SZ9 LF STRL

## (undated) DEVICE — VIOLET POLYDIOXANONE POLYMER, SYNTHETIC ABSORBABLE SUTURE CLIPS: Brand: LAPRA-TY

## (undated) DEVICE — GW SAFARI2 PRESH SM CRV .035IN 3.2X2.9X275CM

## (undated) DEVICE — DRSNG WND BORDR/ADHS NONADHR/GZ LF 2X2IN STRL

## (undated) DEVICE — MEDI-VAC NON-CONDUCTIVE SUCTION TUBING: Brand: CARDINAL HEALTH

## (undated) DEVICE — PK CATH CARD 10

## (undated) DEVICE — BNDG ELAS CO-FLEX SLF ADHR 6IN 5YD LF STRL

## (undated) DEVICE — BLAD SAGITTAL MAKO NRW

## (undated) DEVICE — COVER,LIGHT HANDLE,FLX,1/PK: Brand: MEDLINE INDUSTRIES, INC.

## (undated) DEVICE — GLV SURG DERMASSURE GRN LF PF 7.0

## (undated) DEVICE — Device: Brand: MEDEX

## (undated) DEVICE — STRYKER PERFORMANCE SERIES SAGITTAL BLADE: Brand: STRYKER PERFORMANCE SERIES

## (undated) DEVICE — TRY EPID SFTY 18G 3.5IN 1T7680

## (undated) DEVICE — TB SXN FRAZIER 12F STRL

## (undated) DEVICE — FLEX TIP DUPLO SPRAYER

## (undated) DEVICE — GOWN,NON-REINFORCED,SIRUS,SET IN SLV,XL: Brand: MEDLINE

## (undated) DEVICE — LOCATION REFERENCE PATCH KIT: Brand: RHYTHMIA™ MAPPING SYSTEM

## (undated) DEVICE — GW INQWIRE FC PTFE STD J/1.5 .035 260

## (undated) DEVICE — DEV COMP RAD PRELUDESYNC 24CM

## (undated) DEVICE — GW INQWIRE FC PTFE J/3MM .035 180

## (undated) DEVICE — STERILE (15.2 TAPERED TO 7.6 X 183CM) POLYETHYLENE ACCORDION-FOLDED COVER FOR USE WITH SIEMENS ACUNAV ULTRASOUND CATHETER FAMILY CONNECTOR: Brand: SWIFTLINK TRANSDUCER COVER

## (undated) DEVICE — GLV SURG SENSICARE PI ORTHO SZ8 LF STRL

## (undated) DEVICE — PIN BONE MAKO PT 4X110MM SS 1P/U STRL

## (undated) DEVICE — MODEL AT P65, P/N 701554-001KIT CONTENTS: HAND CONTROLLER, 3-WAY HIGH-PRESSURE STOPCOCK WITH ROTATING END AND PREMIUM HIGH-PRESSURE TUBING: Brand: ANGIOTOUCH® KIT

## (undated) DEVICE — SUT MNCRYL UROLOGICAL UR6 2/0 27IN Y605H

## (undated) DEVICE — UNDERGLV SURG BIOGEL INDICAT PI SZ8 BLU

## (undated) DEVICE — DRSNG WND BORDR/ADHS NONADHR/GZ LF 4X4IN STRL

## (undated) DEVICE — SOL LR 1000ML

## (undated) DEVICE — ELECTRD RETRN/GRND MEGADYNE SGL/PLT W/CORD 9FT DISP

## (undated) DEVICE — SKIN AFFIX SURG ADHESIVE 72/CS 0.55ML: Brand: MEDLINE

## (undated) DEVICE — PATIENT RETURN ELECTRODE, SINGLE-USE, CONTACT QUALITY MONITORING, ADULT, WITH 9FT CORD, FOR PATIENTS WEIGING OVER 33LBS. (15KG): Brand: MEGADYNE

## (undated) DEVICE — ADHS SKIN PREMIERPRO EXOFIN TOPICAL HI/VISC .5ML

## (undated) DEVICE — INTRO SHEATH ENGAGE W/50 GW .038 8F12

## (undated) DEVICE — SKIN PREP TRAY W/CHG: Brand: MEDLINE INDUSTRIES, INC.

## (undated) DEVICE — SET PRIMARY GRVTY 10DP MALE LL 104IN

## (undated) DEVICE — PIN BONE MAKO PT 4X140MM SS 1P/U STRL

## (undated) DEVICE — ELECTRD BLD EZ CLN STD 2.5IN

## (undated) DEVICE — OBT BLADLES ENDOWRIST DAVINCI/S 8MM